# Patient Record
Sex: MALE | Race: ASIAN | NOT HISPANIC OR LATINO
[De-identification: names, ages, dates, MRNs, and addresses within clinical notes are randomized per-mention and may not be internally consistent; named-entity substitution may affect disease eponyms.]

---

## 2017-08-18 ENCOUNTER — APPOINTMENT (OUTPATIENT)
Dept: HUMAN REPRODUCTION | Facility: CLINIC | Age: 37
End: 2017-08-18

## 2018-02-16 ENCOUNTER — APPOINTMENT (OUTPATIENT)
Dept: HUMAN REPRODUCTION | Facility: CLINIC | Age: 38
End: 2018-02-16
Payer: COMMERCIAL

## 2018-02-16 PROCEDURE — 89322 SEMEN ANAL STRICT CRITERIA: CPT

## 2018-08-24 ENCOUNTER — APPOINTMENT (OUTPATIENT)
Dept: HUMAN REPRODUCTION | Facility: CLINIC | Age: 38
End: 2018-08-24
Payer: COMMERCIAL

## 2018-08-24 PROCEDURE — 36415 COLL VENOUS BLD VENIPUNCTURE: CPT

## 2018-08-24 PROCEDURE — 99211 OFF/OP EST MAY X REQ PHY/QHP: CPT | Mod: 25

## 2018-11-30 ENCOUNTER — OUTPATIENT (OUTPATIENT)
Dept: OUTPATIENT SERVICES | Facility: HOSPITAL | Age: 38
LOS: 1 days | Discharge: HOME | End: 2018-11-30

## 2018-11-30 DIAGNOSIS — D72.89 OTHER SPECIFIED DISORDERS OF WHITE BLOOD CELLS: ICD-10-CM

## 2018-12-07 ENCOUNTER — RESULT REVIEW (OUTPATIENT)
Age: 38
End: 2018-12-07

## 2018-12-07 ENCOUNTER — APPOINTMENT (OUTPATIENT)
Dept: HEMATOLOGY ONCOLOGY | Facility: CLINIC | Age: 38
End: 2018-12-07
Payer: COMMERCIAL

## 2018-12-07 ENCOUNTER — OUTPATIENT (OUTPATIENT)
Dept: OUTPATIENT SERVICES | Facility: HOSPITAL | Age: 38
LOS: 1 days | End: 2018-12-07
Payer: COMMERCIAL

## 2018-12-07 VITALS
TEMPERATURE: 98.4 F | SYSTOLIC BLOOD PRESSURE: 128 MMHG | HEIGHT: 70.98 IN | BODY MASS INDEX: 34.1 KG/M2 | OXYGEN SATURATION: 98 % | WEIGHT: 243.61 LBS | DIASTOLIC BLOOD PRESSURE: 76 MMHG | RESPIRATION RATE: 18 BRPM | HEART RATE: 76 BPM

## 2018-12-07 DIAGNOSIS — R71.8 OTHER ABNORMALITY OF RED BLOOD CELLS: ICD-10-CM

## 2018-12-07 DIAGNOSIS — G54.8 OTHER NERVE ROOT AND PLEXUS DISORDERS: ICD-10-CM

## 2018-12-07 LAB
ALBUMIN SERPL ELPH-MCNC: 4.5 G/DL
ALP BLD-CCNC: 74 U/L
ALT SERPL-CCNC: 40 U/L
AST SERPL-CCNC: 21 U/L
BASOPHILS # BLD AUTO: 0.1 K/UL — SIGNIFICANT CHANGE UP (ref 0–0.2)
BASOPHILS NFR BLD AUTO: 0.6 % — SIGNIFICANT CHANGE UP (ref 0–2)
BILIRUB DIRECT SERPL-MCNC: 0.2 MG/DL
BILIRUB INDIRECT SERPL-MCNC: 0.4 MG/DL
BILIRUB SERPL-MCNC: 0.6 MG/DL
EOSINOPHIL # BLD AUTO: 0.5 K/UL — SIGNIFICANT CHANGE UP (ref 0–0.5)
EOSINOPHIL NFR BLD AUTO: 3.8 % — SIGNIFICANT CHANGE UP (ref 0–6)
HCT VFR BLD CALC: 45.8 % — SIGNIFICANT CHANGE UP (ref 39–50)
HGB BLD-MCNC: 16.2 G/DL — SIGNIFICANT CHANGE UP (ref 13–17)
IRON SATN MFR SERPL: 24 %
IRON SERPL-MCNC: 81 UG/DL
LDH SERPL-CCNC: 144 U/L
LYMPHOCYTES # BLD AUTO: 23.2 % — SIGNIFICANT CHANGE UP (ref 13–44)
LYMPHOCYTES # BLD AUTO: 3.1 K/UL — SIGNIFICANT CHANGE UP (ref 1–3.3)
MCHC RBC-ENTMCNC: 27.3 PG — SIGNIFICANT CHANGE UP (ref 27–34)
MCHC RBC-ENTMCNC: 35.4 G/DL — SIGNIFICANT CHANGE UP (ref 32–36)
MCV RBC AUTO: 77.1 FL — LOW (ref 80–100)
MONOCYTES # BLD AUTO: 0.8 K/UL — SIGNIFICANT CHANGE UP (ref 0–0.9)
MONOCYTES NFR BLD AUTO: 6.3 % — SIGNIFICANT CHANGE UP (ref 2–14)
NEUTROPHILS # BLD AUTO: 8.8 K/UL — HIGH (ref 1.8–7.4)
NEUTROPHILS NFR BLD AUTO: 66 % — SIGNIFICANT CHANGE UP (ref 43–77)
PLATELET # BLD AUTO: 283 K/UL — SIGNIFICANT CHANGE UP (ref 150–400)
PROT SERPL-MCNC: 7 G/DL
RBC # BLD: 5.94 M/UL — HIGH (ref 4.2–5.8)
RBC # FLD: 13 % — SIGNIFICANT CHANGE UP (ref 10.3–14.5)
TIBC SERPL-MCNC: 344 UG/DL
UIBC SERPL-MCNC: 263 UG/DL
WBC # BLD: 13.3 K/UL — HIGH (ref 3.8–10.5)
WBC # FLD AUTO: 13.3 K/UL — HIGH (ref 3.8–10.5)

## 2018-12-07 PROCEDURE — G0452: CPT | Mod: 26

## 2018-12-07 PROCEDURE — 99205 OFFICE O/P NEW HI 60 MIN: CPT

## 2018-12-07 PROCEDURE — 81206 BCR/ABL1 GENE MAJOR BP: CPT

## 2018-12-07 PROCEDURE — 81207 BCR/ABL1 GENE MINOR BP: CPT

## 2018-12-08 LAB — FERRITIN SERPL-MCNC: 248 NG/ML

## 2018-12-12 LAB — BCR/ABL BY RT - PCR QUANTITATIVE: SIGNIFICANT CHANGE UP

## 2018-12-14 LAB
ALPHA - GLOBIN COMMON MUTATION RESULT: NORMAL
ALPHA - GLOBIN MUTATION VERBATIM: NORMAL

## 2018-12-15 NOTE — REASON FOR VISIT
[Initial Consultation] : an initial consultation for [Blood Count Assessment] : blood count assessment [Leukocytosis] : leukocytosis [Spouse] : spouse [FreeTextEntry2] : sickle cell trait and RBC microcytosis and prenatal counseling

## 2018-12-15 NOTE — ASSESSMENT
[FreeTextEntry1] : Mr. Corral has two unrelated problems. First, he has leukocytosis, which is likely from steroid use or inflammation from chronic back arthritis. Testing is negative for liver disorder or CML. \par \par Secondly, he has microcytosis with sickle cell trait, but testing is negative for iron deficiency or alpha globin gene mutation. Consequently, I believe it is unlikely that he has alpha thalassemia gene mutations (carrier), but it has not been completely ruled out by genetic testing.\par \par Plan:\par 1. Leukocytosis may be from chronic steroid use or inflammation. It should be monitored without specific treatment.\par 2. His risk of having child with some type of thalassemia major or intermedia appears to be extremely unlikely. However, the child has 50% risk of having sickle cell trait. [Palliative Care Plan] : not applicable at this time

## 2018-12-15 NOTE — CONSULT LETTER
[Dear  ___] : Dear  [unfilled], [Consult Letter:] : I had the pleasure of evaluating your patient, [unfilled]. [Please see my note below.] : Please see my note below. [Consult Closing:] : Thank you very much for allowing me to participate in the care of this patient.  If you have any questions, please do not hesitate to contact me. [Sincerely,] : Sincerely, [FreeTextEntry2] : Dr Chapis Aragon\par [address]\[place] [id number] [FreeTextEntry3] : Fish Mullins MD FACP

## 2018-12-15 NOTE — HISTORY OF PRESENT ILLNESS
[de-identified] : Braydon Corral is a 37 yo with sickle cell trait, H/O splenomegaly, H/O TIA, and chronic back pain, who is referred because of RBC microcytosis, prenatal counseling, and leukocytosis. In 2012, he suffered hand numbness, possible TIA, with visual disturbances after event. He was evaluated for leukocytosis at the time and had negative bone marrow biopsy. He has since had migraine headaches but no recurrence of TIA. He has had chronic back pain and steroid injections in 2014, June 2018, July 2018, and November 28, 2018. He has continued to have leukocytosis and had WBC 13.3 and ANC 8.8 on 12/7/18, when first seen by me. He also has sickle cell trait confirmed by hemoglobin electrophoresis and MCV 77.1 and RBC count 5.94 on 12/7/18. He is referred because of concern for thalassemia in future pregnancies with his partner. [Date: ____________] : Patient's last distress assessment performed on [unfilled]. [2 - Distress Level] : Distress Level: 2

## 2018-12-15 NOTE — REVIEW OF SYSTEMS
[Diarrhea] : diarrhea [Negative] : Allergic/Immunologic [FreeTextEntry2] : Weight loss from 255 to 242 lbs [FreeTextEntry7] : H/O rectal bleeding [FreeTextEntry9] : Sciatica; chronic back and knee pain [de-identified] : Chronic headaches associated with sciatica; H/O TIA

## 2018-12-15 NOTE — RESULTS/DATA
[FreeTextEntry1] : CBC (12/7/18): normal except for WBC 13.3, RBC 5.94, MCV 77.1. ANC 8.8. Differential: normal percentages. Hepatic Panel, LDH, Iron Saturation, Ferritin: normal. BCR/ABL test: negative. Alpha Globin Mutation: negative.

## 2018-12-31 ENCOUNTER — APPOINTMENT (OUTPATIENT)
Dept: NEUROSURGERY | Facility: CLINIC | Age: 38
End: 2018-12-31
Payer: COMMERCIAL

## 2018-12-31 VITALS
OXYGEN SATURATION: 97 % | BODY MASS INDEX: 34.79 KG/M2 | WEIGHT: 243 LBS | DIASTOLIC BLOOD PRESSURE: 82 MMHG | RESPIRATION RATE: 18 BRPM | HEART RATE: 98 BPM | HEIGHT: 70 IN | SYSTOLIC BLOOD PRESSURE: 121 MMHG | TEMPERATURE: 98.4 F

## 2018-12-31 DIAGNOSIS — M79.604 PAIN IN RIGHT LEG: ICD-10-CM

## 2018-12-31 DIAGNOSIS — Z82.3 FAMILY HISTORY OF STROKE: ICD-10-CM

## 2018-12-31 PROCEDURE — 99203 OFFICE O/P NEW LOW 30 MIN: CPT

## 2018-12-31 RX ORDER — CLOMIPHENE CITRATE 50 MG/1
50 TABLET ORAL
Qty: 15 | Refills: 0 | Status: DISCONTINUED | COMMUNITY
Start: 2018-03-07

## 2019-01-03 ENCOUNTER — TRANSCRIPTION ENCOUNTER (OUTPATIENT)
Age: 39
End: 2019-01-03

## 2019-01-08 ENCOUNTER — OUTPATIENT (OUTPATIENT)
Dept: OUTPATIENT SERVICES | Facility: HOSPITAL | Age: 39
LOS: 1 days | End: 2019-01-08
Payer: COMMERCIAL

## 2019-01-08 VITALS
HEIGHT: 68 IN | DIASTOLIC BLOOD PRESSURE: 87 MMHG | TEMPERATURE: 99 F | RESPIRATION RATE: 15 BRPM | OXYGEN SATURATION: 100 % | HEART RATE: 99 BPM | SYSTOLIC BLOOD PRESSURE: 131 MMHG | WEIGHT: 244.93 LBS

## 2019-01-08 DIAGNOSIS — E11.9 TYPE 2 DIABETES MELLITUS WITHOUT COMPLICATIONS: ICD-10-CM

## 2019-01-08 DIAGNOSIS — Z98.890 OTHER SPECIFIED POSTPROCEDURAL STATES: Chronic | ICD-10-CM

## 2019-01-08 DIAGNOSIS — M51.26 OTHER INTERVERTEBRAL DISC DISPLACEMENT, LUMBAR REGION: ICD-10-CM

## 2019-01-08 DIAGNOSIS — Z01.818 ENCOUNTER FOR OTHER PREPROCEDURAL EXAMINATION: ICD-10-CM

## 2019-01-08 DIAGNOSIS — L72.9 FOLLICULAR CYST OF THE SKIN AND SUBCUTANEOUS TISSUE, UNSPECIFIED: Chronic | ICD-10-CM

## 2019-01-08 DIAGNOSIS — Z29.9 ENCOUNTER FOR PROPHYLACTIC MEASURES, UNSPECIFIED: ICD-10-CM

## 2019-01-08 LAB
ANION GAP SERPL CALC-SCNC: 14 MMOL/L — SIGNIFICANT CHANGE UP (ref 5–17)
BUN SERPL-MCNC: 10 MG/DL — SIGNIFICANT CHANGE UP (ref 7–23)
CALCIUM SERPL-MCNC: 9.2 MG/DL — SIGNIFICANT CHANGE UP (ref 8.4–10.5)
CHLORIDE SERPL-SCNC: 98 MMOL/L — SIGNIFICANT CHANGE UP (ref 96–108)
CO2 SERPL-SCNC: 23 MMOL/L — SIGNIFICANT CHANGE UP (ref 22–31)
CREAT SERPL-MCNC: 0.96 MG/DL — SIGNIFICANT CHANGE UP (ref 0.5–1.3)
GLUCOSE SERPL-MCNC: 272 MG/DL — HIGH (ref 70–99)
HCT VFR BLD CALC: 43.3 % — SIGNIFICANT CHANGE UP (ref 39–50)
HGB BLD-MCNC: 15.2 G/DL — SIGNIFICANT CHANGE UP (ref 13–17)
MCHC RBC-ENTMCNC: 26.9 PG — LOW (ref 27–34)
MCHC RBC-ENTMCNC: 35.1 GM/DL — SIGNIFICANT CHANGE UP (ref 32–36)
MCV RBC AUTO: 76.6 FL — LOW (ref 80–100)
PLATELET # BLD AUTO: 274 K/UL — SIGNIFICANT CHANGE UP (ref 150–400)
POTASSIUM SERPL-MCNC: 3.6 MMOL/L — SIGNIFICANT CHANGE UP (ref 3.5–5.3)
POTASSIUM SERPL-SCNC: 3.6 MMOL/L — SIGNIFICANT CHANGE UP (ref 3.5–5.3)
RBC # BLD: 5.65 M/UL — SIGNIFICANT CHANGE UP (ref 4.2–5.8)
RBC # FLD: 13.8 % — SIGNIFICANT CHANGE UP (ref 10.3–14.5)
SODIUM SERPL-SCNC: 135 MMOL/L — SIGNIFICANT CHANGE UP (ref 135–145)
WBC # BLD: 12.52 K/UL — HIGH (ref 3.8–10.5)
WBC # FLD AUTO: 12.52 K/UL — HIGH (ref 3.8–10.5)

## 2019-01-08 PROCEDURE — 85027 COMPLETE CBC AUTOMATED: CPT

## 2019-01-08 PROCEDURE — 87640 STAPH A DNA AMP PROBE: CPT

## 2019-01-08 PROCEDURE — 80048 BASIC METABOLIC PNL TOTAL CA: CPT

## 2019-01-08 PROCEDURE — G0463: CPT

## 2019-01-08 PROCEDURE — 87641 MR-STAPH DNA AMP PROBE: CPT

## 2019-01-08 PROCEDURE — 83036 HEMOGLOBIN GLYCOSYLATED A1C: CPT

## 2019-01-08 RX ORDER — SODIUM CHLORIDE 9 MG/ML
3 INJECTION INTRAMUSCULAR; INTRAVENOUS; SUBCUTANEOUS EVERY 8 HOURS
Qty: 0 | Refills: 0 | Status: DISCONTINUED | OUTPATIENT
Start: 2019-01-22 | End: 2019-01-22

## 2019-01-08 RX ORDER — LIDOCAINE HCL 20 MG/ML
0.2 VIAL (ML) INJECTION ONCE
Qty: 0 | Refills: 0 | Status: DISCONTINUED | OUTPATIENT
Start: 2019-01-22 | End: 2019-01-22

## 2019-01-08 RX ORDER — CEFAZOLIN SODIUM 1 G
2000 VIAL (EA) INJECTION ONCE
Qty: 0 | Refills: 0 | Status: DISCONTINUED | OUTPATIENT
Start: 2019-01-22 | End: 2019-01-25

## 2019-01-08 NOTE — H&P PST ADULT - HISTORY OF PRESENT ILLNESS
37 y/o male with PMHx of sickle cell trait, possible TIA (2012), RBC microcytosis, and 39 y/o male with PMHx of sickle cell trait, possible TIA (2012), RBC microcytosis, chronic lower back pain c/o intermittent worsening lower back pain radiating down both legs with associated tingling in both feet. Patient reports receiving cortisone injections, most recent in November with little relief. Diagnostic imaging revealed L5-S1 disc extrusion. Evaluated by Dr. Arce. Presents to PST for a scheduled L5-S1 bilateral laminotomies, possible laminectomy and excision of herniated disc on 1/15/2019. 37 y/o male with PMHx of sickle cell trait, possible TIA (2012), RBC microcytosis, chronic lower back pain c/o intermittent worsening lower back pain radiating down both legs with associated tingling in both feet. Patient reports receiving steroid injections, most recent in November with little relief. Diagnostic imaging revealed L5-S1 disc extrusion. Evaluated by Dr. Arce. Presents to PST for a scheduled L5-S1 bilateral laminotomies, possible laminectomy and excision of herniated disc on 1/15/2019.

## 2019-01-08 NOTE — H&P PST ADULT - ASSESSMENT
CAPRINI SCORE [CLOT]    AGE RELATED RISK FACTORS                                                       MOBILITY RELATED FACTORS  [ ] Age 41-60 years                                            (1 Point)                  [ ] Bed rest                                                        (1 Point)  [ ] Age: 61-74 years                                           (2 Points)                 [ ] Plaster cast                                                   (2 Points)  [ ] Age= 75 years                                              (3 Points)                 [ ] Bed bound for more than 72 hours                 (2 Points)    DISEASE RELATED RISK FACTORS                                               GENDER SPECIFIC FACTORS  [ ] Edema in the lower extremities                       (1 Point)                  [ ] Pregnancy                                                     (1 Point)  [ ] Varicose veins                                               (1 Point)                  [ ] Post-partum < 6 weeks                                   (1 Point)             [x] BMI > 25 Kg/m2                                            (1 Point)                  [ ] Hormonal therapy  or oral contraception          (1 Point)                 [ ] Sepsis (in the previous month)                        (1 Point)                  [ ] History of pregnancy complications                 (1 point)  [ ] Pneumonia or serious lung disease                                               [ ] Unexplained or recurrent                     (1 Point)           (in the previous month)                               (1 Point)  [ ] Abnormal pulmonary function test                     (1 Point)                 SURGERY RELATED RISK FACTORS  [ ] Acute myocardial infarction                              (1 Point)                 [ ]  Section                                             (1 Point)  [ ] Congestive heart failure (in the previous month)  (1 Point)               [ ] Minor surgery                                                  (1 Point)   [ ] Inflammatory bowel disease                             (1 Point)                 [ ] Arthroscopic surgery                                        (2 Points)  [ ] Central venous access                                      (2 Points)                [x] General surgery lasting more than 45 minutes   (2 Points)       [ ] Stroke (in the previous month)                          (5 Points)               [ ] Elective arthroplasty                                         (5 Points)                                                                                                                                               HEMATOLOGY RELATED FACTORS                                                 TRAUMA RELATED RISK FACTORS  [ ] Prior episodes of VTE                                     (3 Points)                 [ ] Fracture of the hip, pelvis, or leg                       (5 Points)  [ ] Positive family history for VTE                         (3 Points)                 [ ] Acute spinal cord injury (in the previous month)  (5 Points)  [ ] Prothrombin 02751 A                                     (3 Points)                 [ ] Paralysis  (less than 1 month)                             (5 Points)  [ ] Factor V Leiden                                             (3 Points)                  [ ] Multiple Trauma within 1 month                        (5 Points)  [ ] Lupus anticoagulants                                     (3 Points)                                                           [ ] Anticardiolipin antibodies                               (3 Points)                                                       [ ] High homocysteine in the blood                      (3 Points)                                             [ ] Other congenital or acquired thrombophilia      (3 Points)                                                [ ] Heparin induced thrombocytopenia                  (3 Points)                                          Total Score [   3    ]

## 2019-01-08 NOTE — H&P PST ADULT - NSANTHOSAYNRD_GEN_A_CORE
No. TIANNA screening performed.  STOP BANG Legend: 0-2 = LOW Risk; 3-4 = INTERMEDIATE Risk; 5-8 = HIGH Risk

## 2019-01-08 NOTE — H&P PST ADULT - PROBLEM SELECTOR PLAN 1
Scheduled L5-S1 bilateral laminotomies, possible laminectomy and excision of herniated disc on 1/15/2019  Pre-op instructions given, lab work sent

## 2019-01-08 NOTE — H&P PST ADULT - PMH
Disc displacement, lumbar    H/O pilonidal cyst    T2DM (type 2 diabetes mellitus) Disc displacement, lumbar    H/O pilonidal cyst    RBC microcytosis    Sickle cell trait    T2DM (type 2 diabetes mellitus)    TIA (transient ischemic attack)  possible, ~2012

## 2019-01-08 NOTE — H&P PST ADULT - ATTENDING COMMENTS
Pt as per above note. Symptoms are unchanged. He underwent CT and new MRI, the latter showing evidence of arachnoiditis and former showing large calcified disc with spurs at L5S1.  L45 with loss of dixon signal and mild bulging without stenosis.  Plan was changed to perform L5S1 discectomy, interbody fusion, posterolateral instrumentation  and fusion.  R/B/A discussed with pt in clinic and over phone including potential of adjacent segment breakdown and benefit of single level fusion.  Pt ok to proceed.

## 2019-01-09 PROBLEM — G45.9 TRANSIENT CEREBRAL ISCHEMIC ATTACK, UNSPECIFIED: Chronic | Status: ACTIVE | Noted: 2019-01-08

## 2019-01-09 LAB
HBA1C BLD-MCNC: 8.4 % — HIGH (ref 4–5.6)
MRSA PCR RESULT.: SIGNIFICANT CHANGE UP
S AUREUS DNA NOSE QL NAA+PROBE: SIGNIFICANT CHANGE UP

## 2019-01-11 ENCOUNTER — APPOINTMENT (OUTPATIENT)
Dept: MRI IMAGING | Facility: CLINIC | Age: 39
End: 2019-01-11
Payer: COMMERCIAL

## 2019-01-11 ENCOUNTER — OUTPATIENT (OUTPATIENT)
Dept: OUTPATIENT SERVICES | Facility: HOSPITAL | Age: 39
LOS: 1 days | End: 2019-01-11
Payer: COMMERCIAL

## 2019-01-11 DIAGNOSIS — Z00.8 ENCOUNTER FOR OTHER GENERAL EXAMINATION: ICD-10-CM

## 2019-01-11 DIAGNOSIS — Z98.890 OTHER SPECIFIED POSTPROCEDURAL STATES: Chronic | ICD-10-CM

## 2019-01-11 DIAGNOSIS — L72.9 FOLLICULAR CYST OF THE SKIN AND SUBCUTANEOUS TISSUE, UNSPECIFIED: Chronic | ICD-10-CM

## 2019-01-11 PROBLEM — D57.3 SICKLE-CELL TRAIT: Chronic | Status: ACTIVE | Noted: 2019-01-08

## 2019-01-11 PROBLEM — E11.9 TYPE 2 DIABETES MELLITUS WITHOUT COMPLICATIONS: Chronic | Status: ACTIVE | Noted: 2019-01-08

## 2019-01-11 PROBLEM — R71.8 OTHER ABNORMALITY OF RED BLOOD CELLS: Chronic | Status: ACTIVE | Noted: 2019-01-08

## 2019-01-11 PROBLEM — M51.26 OTHER INTERVERTEBRAL DISC DISPLACEMENT, LUMBAR REGION: Chronic | Status: ACTIVE | Noted: 2019-01-08

## 2019-01-11 PROCEDURE — 72148 MRI LUMBAR SPINE W/O DYE: CPT | Mod: 26

## 2019-01-11 PROCEDURE — 72148 MRI LUMBAR SPINE W/O DYE: CPT

## 2019-01-12 ENCOUNTER — APPOINTMENT (OUTPATIENT)
Dept: CT IMAGING | Facility: CLINIC | Age: 39
End: 2019-01-12

## 2019-01-14 ENCOUNTER — OUTPATIENT (OUTPATIENT)
Dept: OUTPATIENT SERVICES | Facility: HOSPITAL | Age: 39
LOS: 1 days | End: 2019-01-14
Payer: COMMERCIAL

## 2019-01-14 DIAGNOSIS — M51.26 OTHER INTERVERTEBRAL DISC DISPLACEMENT, LUMBAR REGION: ICD-10-CM

## 2019-01-14 DIAGNOSIS — Z98.890 OTHER SPECIFIED POSTPROCEDURAL STATES: Chronic | ICD-10-CM

## 2019-01-14 DIAGNOSIS — L72.9 FOLLICULAR CYST OF THE SKIN AND SUBCUTANEOUS TISSUE, UNSPECIFIED: Chronic | ICD-10-CM

## 2019-01-14 PROCEDURE — 72131 CT LUMBAR SPINE W/O DYE: CPT

## 2019-01-14 PROCEDURE — 72131 CT LUMBAR SPINE W/O DYE: CPT | Mod: 26

## 2019-01-15 ENCOUNTER — APPOINTMENT (OUTPATIENT)
Dept: RADIOLOGY | Facility: CLINIC | Age: 39
End: 2019-01-15
Payer: COMMERCIAL

## 2019-01-15 ENCOUNTER — OUTPATIENT (OUTPATIENT)
Dept: OUTPATIENT SERVICES | Facility: HOSPITAL | Age: 39
LOS: 1 days | End: 2019-01-15
Payer: COMMERCIAL

## 2019-01-15 DIAGNOSIS — L72.9 FOLLICULAR CYST OF THE SKIN AND SUBCUTANEOUS TISSUE, UNSPECIFIED: Chronic | ICD-10-CM

## 2019-01-15 DIAGNOSIS — M51.26 OTHER INTERVERTEBRAL DISC DISPLACEMENT, LUMBAR REGION: ICD-10-CM

## 2019-01-15 DIAGNOSIS — Z98.890 OTHER SPECIFIED POSTPROCEDURAL STATES: Chronic | ICD-10-CM

## 2019-01-15 PROCEDURE — 72082 X-RAY EXAM ENTIRE SPI 2/3 VW: CPT

## 2019-01-15 PROCEDURE — 72082 X-RAY EXAM ENTIRE SPI 2/3 VW: CPT | Mod: 26

## 2019-01-18 ENCOUNTER — APPOINTMENT (OUTPATIENT)
Dept: ORTHOPEDIC SURGERY | Facility: CLINIC | Age: 39
End: 2019-01-18
Payer: COMMERCIAL

## 2019-01-18 VITALS
HEART RATE: 111 BPM | DIASTOLIC BLOOD PRESSURE: 85 MMHG | BODY MASS INDEX: 34.79 KG/M2 | HEIGHT: 70 IN | SYSTOLIC BLOOD PRESSURE: 119 MMHG | WEIGHT: 243 LBS

## 2019-01-18 PROCEDURE — 99215 OFFICE O/P EST HI 40 MIN: CPT

## 2019-01-21 ENCOUNTER — TRANSCRIPTION ENCOUNTER (OUTPATIENT)
Age: 39
End: 2019-01-21

## 2019-01-22 ENCOUNTER — INPATIENT (INPATIENT)
Facility: HOSPITAL | Age: 39
LOS: 6 days | Discharge: HOME | DRG: 459 | End: 2019-01-29
Attending: NEUROLOGICAL SURGERY | Admitting: NEUROLOGICAL SURGERY
Payer: COMMERCIAL

## 2019-01-22 ENCOUNTER — RESULT REVIEW (OUTPATIENT)
Age: 39
End: 2019-01-22

## 2019-01-22 ENCOUNTER — APPOINTMENT (OUTPATIENT)
Dept: NEUROSURGERY | Facility: CLINIC | Age: 39
End: 2019-01-22
Payer: COMMERCIAL

## 2019-01-22 VITALS
RESPIRATION RATE: 17 BRPM | HEART RATE: 83 BPM | OXYGEN SATURATION: 98 % | TEMPERATURE: 99 F | DIASTOLIC BLOOD PRESSURE: 87 MMHG | WEIGHT: 244.93 LBS | SYSTOLIC BLOOD PRESSURE: 119 MMHG | HEIGHT: 68 IN

## 2019-01-22 DIAGNOSIS — L72.9 FOLLICULAR CYST OF THE SKIN AND SUBCUTANEOUS TISSUE, UNSPECIFIED: Chronic | ICD-10-CM

## 2019-01-22 DIAGNOSIS — M51.26 OTHER INTERVERTEBRAL DISC DISPLACEMENT, LUMBAR REGION: ICD-10-CM

## 2019-01-22 DIAGNOSIS — Z98.890 OTHER SPECIFIED POSTPROCEDURAL STATES: Chronic | ICD-10-CM

## 2019-01-22 LAB
ANION GAP SERPL CALC-SCNC: 15 MMOL/L — SIGNIFICANT CHANGE UP (ref 5–17)
BASOPHILS # BLD AUTO: 0 K/UL — SIGNIFICANT CHANGE UP (ref 0–0.2)
BASOPHILS NFR BLD AUTO: 0 % — SIGNIFICANT CHANGE UP (ref 0–2)
BLD GP AB SCN SERPL QL: NEGATIVE — SIGNIFICANT CHANGE UP
BUN SERPL-MCNC: 8 MG/DL — SIGNIFICANT CHANGE UP (ref 7–23)
CALCIUM SERPL-MCNC: 8.9 MG/DL — SIGNIFICANT CHANGE UP (ref 8.4–10.5)
CHLORIDE SERPL-SCNC: 103 MMOL/L — SIGNIFICANT CHANGE UP (ref 96–108)
CO2 SERPL-SCNC: 23 MMOL/L — SIGNIFICANT CHANGE UP (ref 22–31)
CREAT SERPL-MCNC: 1.14 MG/DL — SIGNIFICANT CHANGE UP (ref 0.5–1.3)
EOSINOPHIL # BLD AUTO: 0 K/UL — SIGNIFICANT CHANGE UP (ref 0–0.5)
EOSINOPHIL NFR BLD AUTO: 0.2 % — SIGNIFICANT CHANGE UP (ref 0–6)
GLUCOSE BLDC GLUCOMTR-MCNC: 200 MG/DL — HIGH (ref 70–99)
GLUCOSE BLDC GLUCOMTR-MCNC: 204 MG/DL — HIGH (ref 70–99)
GLUCOSE BLDC GLUCOMTR-MCNC: 223 MG/DL — HIGH (ref 70–99)
GLUCOSE BLDC GLUCOMTR-MCNC: 228 MG/DL — HIGH (ref 70–99)
GLUCOSE SERPL-MCNC: 257 MG/DL — HIGH (ref 70–99)
HCT VFR BLD CALC: 44.1 % — SIGNIFICANT CHANGE UP (ref 39–50)
HGB BLD-MCNC: 15.6 G/DL — SIGNIFICANT CHANGE UP (ref 13–17)
LYMPHOCYTES # BLD AUTO: 1.5 K/UL — SIGNIFICANT CHANGE UP (ref 1–3.3)
LYMPHOCYTES # BLD AUTO: 7.9 % — LOW (ref 13–44)
MCHC RBC-ENTMCNC: 27.5 PG — SIGNIFICANT CHANGE UP (ref 27–34)
MCHC RBC-ENTMCNC: 35.4 GM/DL — SIGNIFICANT CHANGE UP (ref 32–36)
MCV RBC AUTO: 77.6 FL — LOW (ref 80–100)
MONOCYTES # BLD AUTO: 0.5 K/UL — SIGNIFICANT CHANGE UP (ref 0–0.9)
MONOCYTES NFR BLD AUTO: 2.5 % — SIGNIFICANT CHANGE UP (ref 2–14)
NEUTROPHILS # BLD AUTO: 16.8 K/UL — HIGH (ref 1.8–7.4)
NEUTROPHILS NFR BLD AUTO: 89.4 % — HIGH (ref 43–77)
PLATELET # BLD AUTO: 322 K/UL — SIGNIFICANT CHANGE UP (ref 150–400)
POTASSIUM SERPL-MCNC: 4.9 MMOL/L — SIGNIFICANT CHANGE UP (ref 3.5–5.3)
POTASSIUM SERPL-SCNC: 4.9 MMOL/L — SIGNIFICANT CHANGE UP (ref 3.5–5.3)
RBC # BLD: 5.67 M/UL — SIGNIFICANT CHANGE UP (ref 4.2–5.8)
RBC # FLD: 13 % — SIGNIFICANT CHANGE UP (ref 10.3–14.5)
RH IG SCN BLD-IMP: POSITIVE — SIGNIFICANT CHANGE UP
RH IG SCN BLD-IMP: POSITIVE — SIGNIFICANT CHANGE UP
SODIUM SERPL-SCNC: 141 MMOL/L — SIGNIFICANT CHANGE UP (ref 135–145)
WBC # BLD: 18.7 K/UL — HIGH (ref 3.8–10.5)
WBC # FLD AUTO: 18.7 K/UL — HIGH (ref 3.8–10.5)

## 2019-01-22 PROCEDURE — 63047 LAM FACETEC & FORAMOT LUMBAR: CPT | Mod: 59

## 2019-01-22 PROCEDURE — 22853 INSJ BIOMECHANICAL DEVICE: CPT

## 2019-01-22 PROCEDURE — 22633 ARTHRD CMBN 1NTRSPC LUMBAR: CPT

## 2019-01-22 PROCEDURE — 88311 DECALCIFY TISSUE: CPT | Mod: 26

## 2019-01-22 PROCEDURE — 88304 TISSUE EXAM BY PATHOLOGIST: CPT | Mod: 26

## 2019-01-22 PROCEDURE — 22840 INSERT SPINE FIXATION DEVICE: CPT

## 2019-01-22 RX ORDER — DEXTROSE 50 % IN WATER 50 %
15 SYRINGE (ML) INTRAVENOUS ONCE
Qty: 0 | Refills: 0 | Status: DISCONTINUED | OUTPATIENT
Start: 2019-01-22 | End: 2019-01-29

## 2019-01-22 RX ORDER — DEXAMETHASONE 0.5 MG/5ML
4 ELIXIR ORAL EVERY 6 HOURS
Qty: 0 | Refills: 0 | Status: DISCONTINUED | OUTPATIENT
Start: 2019-01-22 | End: 2019-01-25

## 2019-01-22 RX ORDER — DOCUSATE SODIUM 100 MG
100 CAPSULE ORAL THREE TIMES A DAY
Qty: 0 | Refills: 0 | Status: DISCONTINUED | OUTPATIENT
Start: 2019-01-22 | End: 2019-01-29

## 2019-01-22 RX ORDER — ONDANSETRON 8 MG/1
4 TABLET, FILM COATED ORAL EVERY 6 HOURS
Qty: 0 | Refills: 0 | Status: DISCONTINUED | OUTPATIENT
Start: 2019-01-22 | End: 2019-01-25

## 2019-01-22 RX ORDER — DEXTROSE 50 % IN WATER 50 %
25 SYRINGE (ML) INTRAVENOUS ONCE
Qty: 0 | Refills: 0 | Status: DISCONTINUED | OUTPATIENT
Start: 2019-01-22 | End: 2019-01-29

## 2019-01-22 RX ORDER — ACETAMINOPHEN 500 MG
1000 TABLET ORAL ONCE
Qty: 0 | Refills: 0 | Status: COMPLETED | OUTPATIENT
Start: 2019-01-22 | End: 2019-01-22

## 2019-01-22 RX ORDER — ASCORBIC ACID 60 MG
500 TABLET,CHEWABLE ORAL
Qty: 0 | Refills: 0 | Status: DISCONTINUED | OUTPATIENT
Start: 2019-01-22 | End: 2019-01-29

## 2019-01-22 RX ORDER — HYDROMORPHONE HYDROCHLORIDE 2 MG/ML
30 INJECTION INTRAMUSCULAR; INTRAVENOUS; SUBCUTANEOUS
Qty: 0 | Refills: 0 | Status: DISCONTINUED | OUTPATIENT
Start: 2019-01-22 | End: 2019-01-25

## 2019-01-22 RX ORDER — HYDROMORPHONE HYDROCHLORIDE 2 MG/ML
0.5 INJECTION INTRAMUSCULAR; INTRAVENOUS; SUBCUTANEOUS
Qty: 0 | Refills: 0 | Status: DISCONTINUED | OUTPATIENT
Start: 2019-01-22 | End: 2019-01-22

## 2019-01-22 RX ORDER — SODIUM CHLORIDE 9 MG/ML
1000 INJECTION, SOLUTION INTRAVENOUS
Qty: 0 | Refills: 0 | Status: DISCONTINUED | OUTPATIENT
Start: 2019-01-22 | End: 2019-01-25

## 2019-01-22 RX ORDER — CEFAZOLIN SODIUM 1 G
2000 VIAL (EA) INJECTION EVERY 8 HOURS
Qty: 0 | Refills: 0 | Status: COMPLETED | OUTPATIENT
Start: 2019-01-22 | End: 2019-01-23

## 2019-01-22 RX ORDER — GLUCAGON INJECTION, SOLUTION 0.5 MG/.1ML
1 INJECTION, SOLUTION SUBCUTANEOUS ONCE
Qty: 0 | Refills: 0 | Status: DISCONTINUED | OUTPATIENT
Start: 2019-01-22 | End: 2019-01-29

## 2019-01-22 RX ORDER — MAGNESIUM HYDROXIDE 400 MG/1
30 TABLET, CHEWABLE ORAL EVERY 12 HOURS
Qty: 0 | Refills: 0 | Status: DISCONTINUED | OUTPATIENT
Start: 2019-01-22 | End: 2019-01-29

## 2019-01-22 RX ORDER — METHOCARBAMOL 500 MG/1
750 TABLET, FILM COATED ORAL EVERY 6 HOURS
Qty: 0 | Refills: 0 | Status: DISCONTINUED | OUTPATIENT
Start: 2019-01-22 | End: 2019-01-29

## 2019-01-22 RX ORDER — SENNA PLUS 8.6 MG/1
2 TABLET ORAL AT BEDTIME
Qty: 0 | Refills: 0 | Status: DISCONTINUED | OUTPATIENT
Start: 2019-01-22 | End: 2019-01-29

## 2019-01-22 RX ORDER — NALOXONE HYDROCHLORIDE 4 MG/.1ML
0.1 SPRAY NASAL
Qty: 0 | Refills: 0 | Status: DISCONTINUED | OUTPATIENT
Start: 2019-01-22 | End: 2019-01-25

## 2019-01-22 RX ORDER — SODIUM CHLORIDE 9 MG/ML
1000 INJECTION, SOLUTION INTRAVENOUS
Qty: 0 | Refills: 0 | Status: DISCONTINUED | OUTPATIENT
Start: 2019-01-22 | End: 2019-01-29

## 2019-01-22 RX ORDER — DEXTROSE 50 % IN WATER 50 %
12.5 SYRINGE (ML) INTRAVENOUS ONCE
Qty: 0 | Refills: 0 | Status: DISCONTINUED | OUTPATIENT
Start: 2019-01-22 | End: 2019-01-29

## 2019-01-22 RX ORDER — ONDANSETRON 8 MG/1
4 TABLET, FILM COATED ORAL ONCE
Qty: 0 | Refills: 0 | Status: DISCONTINUED | OUTPATIENT
Start: 2019-01-22 | End: 2019-01-22

## 2019-01-22 RX ORDER — GABAPENTIN 400 MG/1
300 CAPSULE ORAL AT BEDTIME
Qty: 0 | Refills: 0 | Status: DISCONTINUED | OUTPATIENT
Start: 2019-01-22 | End: 2019-01-25

## 2019-01-22 RX ORDER — HYDROMORPHONE HYDROCHLORIDE 2 MG/ML
0.5 INJECTION INTRAMUSCULAR; INTRAVENOUS; SUBCUTANEOUS
Qty: 0 | Refills: 0 | Status: DISCONTINUED | OUTPATIENT
Start: 2019-01-22 | End: 2019-01-25

## 2019-01-22 RX ORDER — INSULIN LISPRO 100/ML
VIAL (ML) SUBCUTANEOUS AT BEDTIME
Qty: 0 | Refills: 0 | Status: DISCONTINUED | OUTPATIENT
Start: 2019-01-22 | End: 2019-01-29

## 2019-01-22 RX ORDER — INSULIN LISPRO 100/ML
VIAL (ML) SUBCUTANEOUS
Qty: 0 | Refills: 0 | Status: DISCONTINUED | OUTPATIENT
Start: 2019-01-22 | End: 2019-01-25

## 2019-01-22 RX ADMIN — Medication 0: at 21:38

## 2019-01-22 RX ADMIN — Medication 100 MILLIGRAM(S): at 21:37

## 2019-01-22 RX ADMIN — HYDROMORPHONE HYDROCHLORIDE 30 MILLILITER(S): 2 INJECTION INTRAMUSCULAR; INTRAVENOUS; SUBCUTANEOUS at 19:51

## 2019-01-22 RX ADMIN — HYDROMORPHONE HYDROCHLORIDE 30 MILLILITER(S): 2 INJECTION INTRAMUSCULAR; INTRAVENOUS; SUBCUTANEOUS at 18:21

## 2019-01-22 RX ADMIN — Medication 400 MILLIGRAM(S): at 22:25

## 2019-01-22 RX ADMIN — HYDROMORPHONE HYDROCHLORIDE 30 MILLILITER(S): 2 INJECTION INTRAMUSCULAR; INTRAVENOUS; SUBCUTANEOUS at 22:28

## 2019-01-22 RX ADMIN — HYDROMORPHONE HYDROCHLORIDE 30 MILLILITER(S): 2 INJECTION INTRAMUSCULAR; INTRAVENOUS; SUBCUTANEOUS at 23:36

## 2019-01-22 RX ADMIN — GABAPENTIN 300 MILLIGRAM(S): 400 CAPSULE ORAL at 21:40

## 2019-01-22 RX ADMIN — SODIUM CHLORIDE 3 MILLILITER(S): 9 INJECTION INTRAMUSCULAR; INTRAVENOUS; SUBCUTANEOUS at 07:07

## 2019-01-22 RX ADMIN — SODIUM CHLORIDE 100 MILLILITER(S): 9 INJECTION, SOLUTION INTRAVENOUS at 22:26

## 2019-01-22 RX ADMIN — Medication 4 MILLIGRAM(S): at 22:41

## 2019-01-22 RX ADMIN — Medication 1000 MILLIGRAM(S): at 22:26

## 2019-01-22 NOTE — PROGRESS NOTE ADULT - SUBJECTIVE AND OBJECTIVE BOX
Patient seen and examined at bedside.    T(C): 36 (01-22-19 @ 17:55), Max: 37 (01-22-19 @ 06:44)  HR: 99 (01-22-19 @ 18:45) (83 - 101)  BP: 135/77 (01-22-19 @ 18:45) (119/87 - 150/-)  RR: 17 (01-22-19 @ 10:18) (17 - 17)  SpO2: 99% (01-22-19 @ 18:45) (98% - 100%)  Wt(kg): --    Exam:    PHYSICAL EXAM:    Constitutional: No Acute Distress     Neurological: AOx3, Following Commands    Motor exam:          Upper extremity                         Delt     Bicep     Tricep    HG                                                 R         5/5        5/5        5/5       5/5                                               L          5/5        5/5        5/5       5/5          Lower extremity                        HF         KF        KE       DF         PF                            R (pain limited)          4/5        5/5        5/5       5/5         5/5                                               L         5/5        5/5       5/5       5/5          5/5                                                 Sensation: [X] intact to light touch  [] decreased:     No clonus    Incision: c/d/i

## 2019-01-22 NOTE — PROGRESS NOTE ADULT - ASSESSMENT
38 yr old s/p Bilateral L5-S1 TLIFs w/ pedicle screw fixation Intraoperative CSF leak w/ primary repair    Plan:  - Floor  - PT  - Pain control  - HMV

## 2019-01-22 NOTE — BRIEF OPERATIVE NOTE - PROCEDURE
<<-----Click on this checkbox to enter Procedure Posterior lumbar interbody fusion (PLIF) or posterior transforaminal lumbar interbody fusion (TLIF) at single level  01/22/2019  Bilateral L5-S1 TLIF and pedicle screw fixation  Active  [id number]

## 2019-01-23 DIAGNOSIS — D72.829 ELEVATED WHITE BLOOD CELL COUNT, UNSPECIFIED: ICD-10-CM

## 2019-01-23 DIAGNOSIS — E11.9 TYPE 2 DIABETES MELLITUS WITHOUT COMPLICATIONS: ICD-10-CM

## 2019-01-23 DIAGNOSIS — M54.9 DORSALGIA, UNSPECIFIED: ICD-10-CM

## 2019-01-23 LAB
ANION GAP SERPL CALC-SCNC: 11 MMOL/L — SIGNIFICANT CHANGE UP (ref 5–17)
BASOPHILS # BLD AUTO: 0.01 K/UL — SIGNIFICANT CHANGE UP (ref 0–0.2)
BASOPHILS NFR BLD AUTO: 0.1 % — SIGNIFICANT CHANGE UP (ref 0–2)
BUN SERPL-MCNC: 7 MG/DL — SIGNIFICANT CHANGE UP (ref 7–23)
CALCIUM SERPL-MCNC: 8.5 MG/DL — SIGNIFICANT CHANGE UP (ref 8.4–10.5)
CHLORIDE SERPL-SCNC: 104 MMOL/L — SIGNIFICANT CHANGE UP (ref 96–108)
CO2 SERPL-SCNC: 27 MMOL/L — SIGNIFICANT CHANGE UP (ref 22–31)
CREAT SERPL-MCNC: 0.88 MG/DL — SIGNIFICANT CHANGE UP (ref 0.5–1.3)
EOSINOPHIL # BLD AUTO: 0 K/UL — SIGNIFICANT CHANGE UP (ref 0–0.5)
EOSINOPHIL NFR BLD AUTO: 0 % — SIGNIFICANT CHANGE UP (ref 0–6)
GLUCOSE BLDC GLUCOMTR-MCNC: 212 MG/DL — HIGH (ref 70–99)
GLUCOSE BLDC GLUCOMTR-MCNC: 215 MG/DL — HIGH (ref 70–99)
GLUCOSE BLDC GLUCOMTR-MCNC: 229 MG/DL — HIGH (ref 70–99)
GLUCOSE BLDC GLUCOMTR-MCNC: 232 MG/DL — HIGH (ref 70–99)
GLUCOSE BLDC GLUCOMTR-MCNC: 233 MG/DL — HIGH (ref 70–99)
GLUCOSE SERPL-MCNC: 237 MG/DL — HIGH (ref 70–99)
HCT VFR BLD CALC: 39.4 % — SIGNIFICANT CHANGE UP (ref 39–50)
HGB BLD-MCNC: 13.4 G/DL — SIGNIFICANT CHANGE UP (ref 13–17)
IMM GRANULOCYTES NFR BLD AUTO: 0.3 % — SIGNIFICANT CHANGE UP (ref 0–1.5)
LYMPHOCYTES # BLD AUTO: 1.39 K/UL — SIGNIFICANT CHANGE UP (ref 1–3.3)
LYMPHOCYTES # BLD AUTO: 8 % — LOW (ref 13–44)
MCHC RBC-ENTMCNC: 27 PG — SIGNIFICANT CHANGE UP (ref 27–34)
MCHC RBC-ENTMCNC: 34 GM/DL — SIGNIFICANT CHANGE UP (ref 32–36)
MCV RBC AUTO: 79.3 FL — LOW (ref 80–100)
MONOCYTES # BLD AUTO: 0.86 K/UL — SIGNIFICANT CHANGE UP (ref 0–0.9)
MONOCYTES NFR BLD AUTO: 5 % — SIGNIFICANT CHANGE UP (ref 2–14)
NEUTROPHILS # BLD AUTO: 15.03 K/UL — HIGH (ref 1.8–7.4)
NEUTROPHILS NFR BLD AUTO: 86.6 % — HIGH (ref 43–77)
PLATELET # BLD AUTO: 287 K/UL — SIGNIFICANT CHANGE UP (ref 150–400)
POTASSIUM SERPL-MCNC: 4.2 MMOL/L — SIGNIFICANT CHANGE UP (ref 3.5–5.3)
POTASSIUM SERPL-SCNC: 4.2 MMOL/L — SIGNIFICANT CHANGE UP (ref 3.5–5.3)
RBC # BLD: 4.97 M/UL — SIGNIFICANT CHANGE UP (ref 4.2–5.8)
RBC # FLD: 14.1 % — SIGNIFICANT CHANGE UP (ref 10.3–14.5)
SODIUM SERPL-SCNC: 142 MMOL/L — SIGNIFICANT CHANGE UP (ref 135–145)
WBC # BLD: 17.35 K/UL — HIGH (ref 3.8–10.5)
WBC # FLD AUTO: 17.35 K/UL — HIGH (ref 3.8–10.5)

## 2019-01-23 PROCEDURE — 99222 1ST HOSP IP/OBS MODERATE 55: CPT

## 2019-01-23 RX ORDER — ACETAMINOPHEN 500 MG
1000 TABLET ORAL ONCE
Qty: 0 | Refills: 0 | Status: COMPLETED | OUTPATIENT
Start: 2019-01-23 | End: 2019-01-23

## 2019-01-23 RX ORDER — ENOXAPARIN SODIUM 100 MG/ML
40 INJECTION SUBCUTANEOUS AT BEDTIME
Qty: 0 | Refills: 0 | Status: DISCONTINUED | OUTPATIENT
Start: 2019-01-23 | End: 2019-01-29

## 2019-01-23 RX ORDER — INSULIN GLARGINE 100 [IU]/ML
5 INJECTION, SOLUTION SUBCUTANEOUS AT BEDTIME
Qty: 0 | Refills: 0 | Status: DISCONTINUED | OUTPATIENT
Start: 2019-01-23 | End: 2019-01-24

## 2019-01-23 RX ORDER — INSULIN LISPRO 100/ML
4 VIAL (ML) SUBCUTANEOUS
Qty: 0 | Refills: 0 | Status: DISCONTINUED | OUTPATIENT
Start: 2019-01-23 | End: 2019-01-29

## 2019-01-23 RX ADMIN — HYDROMORPHONE HYDROCHLORIDE 30 MILLILITER(S): 2 INJECTION INTRAMUSCULAR; INTRAVENOUS; SUBCUTANEOUS at 21:51

## 2019-01-23 RX ADMIN — Medication 100 MILLIGRAM(S): at 14:43

## 2019-01-23 RX ADMIN — Medication 100 MILLIGRAM(S): at 05:37

## 2019-01-23 RX ADMIN — GABAPENTIN 300 MILLIGRAM(S): 400 CAPSULE ORAL at 21:55

## 2019-01-23 RX ADMIN — Medication 4 UNIT(S): at 14:45

## 2019-01-23 RX ADMIN — Medication 4 UNIT(S): at 18:41

## 2019-01-23 RX ADMIN — INSULIN GLARGINE 5 UNIT(S): 100 INJECTION, SOLUTION SUBCUTANEOUS at 21:56

## 2019-01-23 RX ADMIN — Medication 100 MILLIGRAM(S): at 21:54

## 2019-01-23 RX ADMIN — Medication 500 MILLIGRAM(S): at 21:55

## 2019-01-23 RX ADMIN — Medication 400 MILLIGRAM(S): at 23:57

## 2019-01-23 RX ADMIN — Medication 400 MILLIGRAM(S): at 16:18

## 2019-01-23 RX ADMIN — Medication 4 MILLIGRAM(S): at 11:32

## 2019-01-23 RX ADMIN — Medication 4: at 08:10

## 2019-01-23 RX ADMIN — Medication 4: at 14:43

## 2019-01-23 RX ADMIN — HYDROMORPHONE HYDROCHLORIDE 30 MILLILITER(S): 2 INJECTION INTRAMUSCULAR; INTRAVENOUS; SUBCUTANEOUS at 14:49

## 2019-01-23 RX ADMIN — ENOXAPARIN SODIUM 40 MILLIGRAM(S): 100 INJECTION SUBCUTANEOUS at 22:03

## 2019-01-23 RX ADMIN — Medication 4: at 18:41

## 2019-01-23 RX ADMIN — Medication 4 MILLIGRAM(S): at 05:38

## 2019-01-23 RX ADMIN — SENNA PLUS 2 TABLET(S): 8.6 TABLET ORAL at 21:56

## 2019-01-23 RX ADMIN — HYDROMORPHONE HYDROCHLORIDE 30 MILLILITER(S): 2 INJECTION INTRAMUSCULAR; INTRAVENOUS; SUBCUTANEOUS at 11:17

## 2019-01-23 RX ADMIN — HYDROMORPHONE HYDROCHLORIDE 30 MILLILITER(S): 2 INJECTION INTRAMUSCULAR; INTRAVENOUS; SUBCUTANEOUS at 03:54

## 2019-01-23 RX ADMIN — HYDROMORPHONE HYDROCHLORIDE 30 MILLILITER(S): 2 INJECTION INTRAMUSCULAR; INTRAVENOUS; SUBCUTANEOUS at 08:05

## 2019-01-23 NOTE — PROGRESS NOTE ADULT - SUBJECTIVE AND OBJECTIVE BOX
Day 1 of Anesthesia Pain Management Service    SUBJECTIVE: Patient is doing well with IV PCA    Pain Scale Score:	[X] Refer to charted pain scores    THERAPY:    [ ] IV PCA Morphine		[ ] 5 mg/mL	[ ] 1 mg/mL  [X] IV PCA Hydromorphone	[ ] 5 mg/mL	[X] 1 mg/mL  [ ] IV PCA Fentanyl		[ ] 50 micrograms/mL    Demand dose: 0.2 mg     Lockout: 6 minutes   Continuous Rate: 0 mg/hr  4 Hour Limit: 4 mg    MEDICATIONS  (STANDING):  ceFAZolin   IVPB 2000 milliGRAM(s) IV Intermittent once  dexamethasone  Injectable 4 milliGRAM(s) IV Push every 6 hours  dextrose 5%. 1000 milliLiter(s) (50 mL/Hr) IV Continuous <Continuous>  dextrose 50% Injectable 12.5 Gram(s) IV Push once  dextrose 50% Injectable 25 Gram(s) IV Push once  dextrose 50% Injectable 25 Gram(s) IV Push once  docusate sodium 100 milliGRAM(s) Oral three times a day  gabapentin 300 milliGRAM(s) Oral at bedtime  HYDROmorphone PCA (1 mG/mL) 30 milliLiter(s) PCA Continuous PCA Continuous  insulin glargine Injectable (LANTUS) 5 Unit(s) SubCutaneous at bedtime  insulin lispro (HumaLOG) corrective regimen sliding scale   SubCutaneous three times a day before meals  insulin lispro (HumaLOG) corrective regimen sliding scale   SubCutaneous at bedtime  insulin lispro Injectable (HumaLOG) 4 Unit(s) SubCutaneous three times a day before meals  lactated ringers. 1000 milliLiter(s) (100 mL/Hr) IV Continuous <Continuous>  senna 2 Tablet(s) Oral at bedtime    MEDICATIONS  (PRN):  ascorbic acid 500 milliGRAM(s) Oral two times a day PRN wound healing  dextrose 40% Gel 15 Gram(s) Oral once PRN Blood Glucose LESS THAN 70 milliGRAM(s)/deciliter  glucagon  Injectable 1 milliGRAM(s) IntraMuscular once PRN Glucose LESS THAN 70 milligrams/deciliter  HYDROmorphone PCA (1 mG/mL) Rescue Clinician Bolus 0.5 milliGRAM(s) IV Push every 15 minutes PRN for Pain Scale GREATER THAN 6  magnesium hydroxide Suspension 30 milliLiter(s) Oral every 12 hours PRN Constipation  methocarbamol 750 milliGRAM(s) Oral every 6 hours PRN Back Spasms  naloxone Injectable 0.1 milliGRAM(s) IV Push every 3 minutes PRN For ANY of the following changes in patient status:  A. RR LESS THAN 10 breaths per minute, B. Oxygen saturation LESS THAN 90%, C. Sedation score of 6  ondansetron Injectable 4 milliGRAM(s) IV Push every 6 hours PRN Nausea      OBJECTIVE:    Sedation Score:	[ X] Alert	[ ] Drowsy 	[ ] Arousable	[ ] Asleep	[ ] Unresponsive    Side Effects:	[X ] None	[ ] Nausea	[ ] Vomiting	[ ] Pruritus  		[ ] Other:    Vital Signs Last 24 Hrs  T(C): 36.4 (23 Jan 2019 08:40), Max: 37 (22 Jan 2019 10:18)  T(F): 97.6 (23 Jan 2019 08:40), Max: 98.6 (22 Jan 2019 22:00)  HR: 75 (23 Jan 2019 08:40) (65 - 101)  BP: 109/70 (23 Jan 2019 08:40) (101/61 - 156/87)  BP(mean): 87 (22 Jan 2019 21:30) (87 - 116)  RR: 18 (23 Jan 2019 08:40) (14 - 20)  SpO2: 97% (23 Jan 2019 08:40) (97% - 100%)    ASSESSMENT/ PLAN    Therapy to  be:               [X] Continued   [ ] Discontinued   [ ] Changed to PRN Analgesics    Documentation and Verification of current medications:   [X] Done	[ ] Not done, not eligible    Comments: Using 1-3x/hr. HOB flat until Thursday as per surgery

## 2019-01-23 NOTE — PROGRESS NOTE ADULT - ASSESSMENT
37 y/o male with PMHx of sickle cell trait, possible TIA (2012), RBC microcytosis, chronic lower back pain c/o intermittent worsening lower back pain radiating down both legs with associated tingling in both feet. Patient reports receiving steroid injections, most recent in November with little relief. Diagnostic imaging revealed L5-S1 disc extrusion. Evaluated by Dr. Arce. Presents to PST for a scheduled L5-S1 bilateral laminotomies, possible laminectomy and excision of herniated disc on 1/15/2019.       PROCEDURE: 1/22 s/p L5 -S1 PLIF with CSF leak primarily repaired     POD#1    PLAN:  Neuro:   - pt to lie flat until tomorrow at 5 pm  - maintain HMV  - maintain ryan until pt is able to get up and OOB  - continue decadron 4 q6  - pain control- continue PCA pump, robaxin prn, gabapentin at bedtime  - continue bowel regimen  - DMT2 - poorly controlled- A1c 8.4,   - continue fingersticks with sliding scale coverage, started lantus 5 u qhs and pre meal humalog 4 u  Respiratory:   - encouraged incentive spirometry  CV:  - vital signs are stable  DVT ppx:   - venodynes, sq lovenox  PT/OT:   - TBD  after pt is able to get up and OOB    Assessment:  Please Check When Present   []  GCS  E   V  M     Heart Failure: []Acute, [] acute on chronic , []chronic  Heart Failure:  [] Diastolic (HFpEF), [] Systolic (HFrEF), []Combined (HFpEF and HFrEF), [] RHF, [] Pulm HTN, [] Other    [] ANA MARIA, [] ATN, [] AIN, [] other  [] CKD1, [] CKD2, [] CKD 3, [] CKD 4, [] CKD 5, []ESRD    Encephalopathy: [] Metabolic, [] Hepatic, [] toxic, [] Neurological, [] Other    Abnormal Nurtitional Status: [] malnurtition (see nutrition note), [ ]underweight: BMI < 19, [] morbid obesity: BMI >40, [] Cachexia    [] Sepsis  [] hypovolemic shock,[] cardiogenic shock, [] hemorrhagic shock, [] neuogenic shock  [] Acute Respiratory Failure  []Cerebral edema, [] Brain compression/ herniation,   [] Functional quadriplegia  [] Acute blood loss anemia    Spectralink # [id number] 39 y/o male with PMHx of sickle cell trait, possible TIA (2012), RBC microcytosis, chronic lower back pain c/o intermittent worsening lower back pain radiating down both legs with associated tingling in both feet. Patient reports receiving steroid injections, most recent in November with little relief. Diagnostic imaging revealed L5-S1 disc extrusion. Evaluated by Dr. Arce. Presents to PST for a scheduled L5-S1 bilateral laminotomies, possible laminectomy and excision of herniated disc on 1/15/2019.       PROCEDURE: 1/22 s/p L5 -S1 PLIF with CSF leak primarily repaired     POD#1    PLAN:  Neuro:   - pt to lie flat until tomorrow at 5 pm  - maintain HMV  - maintain ryan until pt is able to get up and OOB  - continue decadron 4 q6  - pain control- continue PCA pump, robaxin prn, gabapentin at bedtime  - continue bowel regimen  - DMT2 - poorly controlled- A1c 8.4,   - continue fingersticks with sliding scale coverage, started lantus 5 u qhs and pre meal humalog 4 u  - Hospitalist consult for medial management  Respiratory:   - encouraged incentive spirometry  CV:  - vital signs are stable  DVT ppx:   - venodynes, sq lovenox  PT/OT:   - TBD  after pt is able to get up and OOB    Assessment:  Please Check When Present   []  GCS  E   V  M     Heart Failure: []Acute, [] acute on chronic , []chronic  Heart Failure:  [] Diastolic (HFpEF), [] Systolic (HFrEF), []Combined (HFpEF and HFrEF), [] RHF, [] Pulm HTN, [] Other    [] ANA MARIA, [] ATN, [] AIN, [] other  [] CKD1, [] CKD2, [] CKD 3, [] CKD 4, [] CKD 5, []ESRD    Encephalopathy: [] Metabolic, [] Hepatic, [] toxic, [] Neurological, [] Other    Abnormal Nurtitional Status: [] malnurtition (see nutrition note), [ ]underweight: BMI < 19, [] morbid obesity: BMI >40, [] Cachexia    [] Sepsis  [] hypovolemic shock,[] cardiogenic shock, [] hemorrhagic shock, [] neuogenic shock  [] Acute Respiratory Failure  []Cerebral edema, [] Brain compression/ herniation,   [] Functional quadriplegia  [] Acute blood loss anemia    Spectralink # [id number]

## 2019-01-23 NOTE — PROGRESS NOTE ADULT - SUBJECTIVE AND OBJECTIVE BOX
SUBJECTIVE: Pt seen and examined, lying flat in bed until tomorrow at 5 pm, he reports pain to the left side ofhis back incision. He also reports sternal pain and ant thigh pain likely from positioning    OVERNIGHT EVENTS: none    Vital Signs Last 24 Hrs  T(C): 36.4 (23 Jan 2019 08:40), Max: 37 (22 Jan 2019 22:00)  T(F): 97.6 (23 Jan 2019 08:40), Max: 98.6 (22 Jan 2019 22:00)  HR: 75 (23 Jan 2019 08:40) (65 - 101)  BP: 109/70 (23 Jan 2019 08:40) (101/61 - 156/87)  BP(mean): 87 (22 Jan 2019 21:30) (87 - 116)  RR: 18 (23 Jan 2019 08:40) (14 - 20)  SpO2: 97% (23 Jan 2019 08:40) (97% - 100%)    PHYSICAL EXAM:    General: No Acute Distress     Neurological: Awake, alert oriented to person, place and time, Following Commands, Muscle Strength normal in all four extremities while lying flat in bed, No Drift, Sensation to Light Touch Intact    Pulmonary: Clear to Auscultation, No Rales, No Rhonchi, No Wheezes     Cardiovascular: S1, S2, Regular Rate and Rhythm     Gastrointestinal: Soft, Nontender, Nondistended     Incision: back dressing c/d/i    LABS:                        13.4   17.35 )-----------( 287      ( 23 Jan 2019 09:08 )             39.4    01-23    142  |  104  |  7   ----------------------------<  237<H>  4.2   |  27  |  0.88    Ca    8.5      23 Jan 2019 07:01      Hemoglobin A1C, Whole Blood: 8.4 % (01-08 @ 21:16)      01-22 @ 07:01  -  01-23 @ 07:00  --------------------------------------------------------  IN: 1850 mL / OUT: 1520 mL / NET: 330 mL    01-23 @ 07:01 - 01-23 @ 13:27  --------------------------------------------------------  IN: 600 mL / OUT: 1800 mL / NET: -1200 mL      DRAINS:  x 24 hrs    MEDICATIONS:  Antibiotics:  ceFAZolin   IVPB 2000 milliGRAM(s) IV Intermittent once    Neuro:  gabapentin 300 milliGRAM(s) Oral at bedtime  HYDROmorphone PCA (1 mG/mL) 30 milliLiter(s) PCA Continuous PCA Continuous  HYDROmorphone PCA (1 mG/mL) Rescue Clinician Bolus 0.5 milliGRAM(s) IV Push every 15 minutes PRN for Pain Scale GREATER THAN 6  methocarbamol 750 milliGRAM(s) Oral every 6 hours PRN Back Spasms  ondansetron Injectable 4 milliGRAM(s) IV Push every 6 hours PRN Nausea    Cardiac:    Pulm:    GI/:  docusate sodium 100 milliGRAM(s) Oral three times a day  magnesium hydroxide Suspension 30 milliLiter(s) Oral every 12 hours PRN Constipation  senna 2 Tablet(s) Oral at bedtime    Other:   ascorbic acid 500 milliGRAM(s) Oral two times a day PRN wound healing  dexamethasone  Injectable 4 milliGRAM(s) IV Push every 6 hours  dextrose 40% Gel 15 Gram(s) Oral once PRN Blood Glucose LESS THAN 70 milliGRAM(s)/deciliter  dextrose 5%. 1000 milliLiter(s) IV Continuous <Continuous>  dextrose 50% Injectable 12.5 Gram(s) IV Push once  dextrose 50% Injectable 25 Gram(s) IV Push once  dextrose 50% Injectable 25 Gram(s) IV Push once  enoxaparin Injectable 40 milliGRAM(s) SubCutaneous at bedtime  glucagon  Injectable 1 milliGRAM(s) IntraMuscular once PRN Glucose LESS THAN 70 milligrams/deciliter  insulin glargine Injectable (LANTUS) 5 Unit(s) SubCutaneous at bedtime  insulin lispro (HumaLOG) corrective regimen sliding scale   SubCutaneous three times a day before meals  insulin lispro (HumaLOG) corrective regimen sliding scale   SubCutaneous at bedtime  insulin lispro Injectable (HumaLOG) 4 Unit(s) SubCutaneous three times a day before meals  lactated ringers. 1000 milliLiter(s) IV Continuous <Continuous>  naloxone Injectable 0.1 milliGRAM(s) IV Push every 3 minutes PRN For ANY of the following changes in patient status:  A. RR LESS THAN 10 breaths per minute, B. Oxygen saturation LESS THAN 90%, C. Sedation score of 6    DIET: [] Regular [] CCD [] Renal [] Puree [] Dysphagia [] Tube Feeds:     IMAGING:   < from: CT Lumbar Spine No Cont (01.14.19 @ 16:35) >  COMPARISON EXAMINATION: MR scans dated 1/11/2019 and 7/14/2014      FINDINGS:    VERTEBRAL BODIES AND DISCS:  Vertebral bodies are normal in height.   Discogenic changes are seen at the L5-S1 level with marginal and facial   epidural osteophyte formation. L5-S1 disc space narrowing and vacuum   phenomenon is seen. Mild L4-L5 disc space narrowing. Intervertebral discs   are otherwise normal in height.  ALIGNMENT:  No subluxations.  L1-L2 LEVEL:  Normal.  L2-L3 LEVEL:  Normal.  L3-L4 LEVEL:  Normal.  L4-L5 LEVEL:  Diffuse disc bulge with small superimposed central disc   protrusion and bilateral facet arthrosis. No significant spinal stenosis.  L5-S1 LEVEL:  Central disc protrusion/ridge with bilateral facet   arthrosis. Mild to moderate bilateral foraminal narrowing.  SPINAL CANAL:  No other intradural or extradural defects are seen.   MISCELLANEOUS:  None.    IMPRESSION:  No significant interval change when compared with the most   recent MR scan. Nerve root thickening and clumping seen previously cannot   be adequately evaluated on the current exam.    Spondylitic changes at the L4-L5 and L5-S1 levels with disc protrusions   most prominent at the L5-S1 level.    < end of copied text >

## 2019-01-23 NOTE — CONSULT NOTE ADULT - SUBJECTIVE AND OBJECTIVE BOX
CC: back pain    HPI:  39 y/o male with PMHx of sickle cell trait, possible TIA (2012- work up negative), RBC microcytosis, DM2, chronic lower back pain c/o intermittent worsening lower back pain radiating down both legs with associated tingling in both feet. Patient reports receiving steroid injections, most recent in November with little relief. Diagnostic imaging revealed L5-S1 disc extrusion. Patient is s/p bilateral L5-S1 TLIF with pedical screw fixation with intraop CSF leak with primary repair (EBL 400cc). Patient currently reports back pain is managed with PCA dilaudid. + flatus. No cough, SOB.    PMD: Dr. Shaniqua Mae [phone]    PAST MEDICAL & SURGICAL HISTORY:  TIA (transient ischemic attack): possible, ~2012  RBC microcytosis  Sickle cell trait  Disc displacement, lumbar  T2DM (type 2 diabetes mellitus)  H/O pilonidal cyst  H/O colonoscopy  Cyst of skin: back, excision      Review of Systems:   CONSTITUTIONAL: No fever, chills  HEENT: No sore throat, vision changes  RESPIRATORY: No cough, wheezing, shortness of breath  CARDIOVASCULAR: No chest pain, palpitations, leg edema  GASTROINTESTINAL: No abdominal pain, nausea, vomiting, diarrhea or constipation. No melena or hematochezia.  GENITOURINARY: No dysuria, frequency, hematuria  NEUROLOGICAL: No headaches, memory loss, loss of strength, numbness, or tremors  SKIN: No itching, burning, rashes, or lesions   MUSCULOSKELETAL: + back pain  PSYCHIATRIC: No depression, anxiety  HEME/LYMPH: No easy bruising, or bleeding gums      Allergies    No Known Allergies    Intolerances        Social History:   ex smoker  social EtOH use    FAMILY HISTORY:  Father with CVA, MI    HOME MEDS:  advil prn  Metformin 500mg QD  Gabapentin 300mg QD    MEDICATIONS  (STANDING):  ceFAZolin   IVPB 2000 milliGRAM(s) IV Intermittent once  dexamethasone  Injectable 4 milliGRAM(s) IV Push every 6 hours  dextrose 5%. 1000 milliLiter(s) (50 mL/Hr) IV Continuous <Continuous>  dextrose 50% Injectable 12.5 Gram(s) IV Push once  dextrose 50% Injectable 25 Gram(s) IV Push once  dextrose 50% Injectable 25 Gram(s) IV Push once  docusate sodium 100 milliGRAM(s) Oral three times a day  gabapentin 300 milliGRAM(s) Oral at bedtime  HYDROmorphone PCA (1 mG/mL) 30 milliLiter(s) PCA Continuous PCA Continuous  insulin glargine Injectable (LANTUS) 5 Unit(s) SubCutaneous at bedtime  insulin lispro (HumaLOG) corrective regimen sliding scale   SubCutaneous three times a day before meals  insulin lispro (HumaLOG) corrective regimen sliding scale   SubCutaneous at bedtime  insulin lispro Injectable (HumaLOG) 4 Unit(s) SubCutaneous three times a day before meals  lactated ringers. 1000 milliLiter(s) (100 mL/Hr) IV Continuous <Continuous>  senna 2 Tablet(s) Oral at bedtime    MEDICATIONS  (PRN):  ascorbic acid 500 milliGRAM(s) Oral two times a day PRN wound healing  dextrose 40% Gel 15 Gram(s) Oral once PRN Blood Glucose LESS THAN 70 milliGRAM(s)/deciliter  glucagon  Injectable 1 milliGRAM(s) IntraMuscular once PRN Glucose LESS THAN 70 milligrams/deciliter  HYDROmorphone PCA (1 mG/mL) Rescue Clinician Bolus 0.5 milliGRAM(s) IV Push every 15 minutes PRN for Pain Scale GREATER THAN 6  magnesium hydroxide Suspension 30 milliLiter(s) Oral every 12 hours PRN Constipation  methocarbamol 750 milliGRAM(s) Oral every 6 hours PRN Back Spasms  naloxone Injectable 0.1 milliGRAM(s) IV Push every 3 minutes PRN For ANY of the following changes in patient status:  A. RR LESS THAN 10 breaths per minute, B. Oxygen saturation LESS THAN 90%, C. Sedation score of 6  ondansetron Injectable 4 milliGRAM(s) IV Push every 6 hours PRN Nausea      Vital Signs Last 24 Hrs  T(C): 36.4 (23 Jan 2019 08:40), Max: 37 (22 Jan 2019 22:00)  T(F): 97.6 (23 Jan 2019 08:40), Max: 98.6 (22 Jan 2019 22:00)  HR: 75 (23 Jan 2019 08:40) (65 - 101)  BP: 109/70 (23 Jan 2019 08:40) (101/61 - 156/87)  BP(mean): 87 (22 Jan 2019 21:30) (87 - 116)  RR: 18 (23 Jan 2019 08:40) (14 - 20)  SpO2: 97% (23 Jan 2019 08:40) (97% - 100%)  CAPILLARY BLOOD GLUCOSE      POCT Blood Glucose.: 215 mg/dL (23 Jan 2019 07:49)  POCT Blood Glucose.: 228 mg/dL (22 Jan 2019 21:32)  POCT Blood Glucose.: 223 mg/dL (22 Jan 2019 14:56)    I&O's Summary    22 Jan 2019 07:01  -  23 Jan 2019 07:00  --------------------------------------------------------  IN: 1850 mL / OUT: 1520 mL / NET: 330 mL    23 Jan 2019 07:01  -  23 Jan 2019 12:05  --------------------------------------------------------  IN: 0 mL / OUT: 1800 mL / NET: -1800 mL        PHYSICAL EXAM:  GENERAL: NAD  HEENT: neck supple  LUNG: Clear to auscultation bilaterally; No wheeze  HEART: S1, S2  ABDOMEN: Soft, Nontender, Nondistended, Bowel sounds present  EXTREMITIES: No leg edema  PSYCH: normal affect, calm  NEUROLOGY: AAO x3, moves legs  SKIN: warm and dry    LABS:                        13.4   17.35 )-----------( 287      ( 23 Jan 2019 09:08 )             39.4     01-23    142  |  104  |  7   ----------------------------<  237<H>  4.2   |  27  |  0.88    Ca    8.5      23 Jan 2019 07:01                RADIOLOGY & ADDITIONAL TESTS:    Imaging Personally Reviewed:    < from: CT Lumbar Spine No Cont (01.14.19 @ 16:35) >    IMPRESSION:  No significant interval change when compared with the most   recent MR scan. Nerve root thickening and clumping seen previously cannot   be adequately evaluated on the current exam.    Spondylitic changes at the L4-L5 and L5-S1 levels with disc protrusions   most prominent at the L5-S1 level.    < end of copied text >    Consultant(s) Notes Reviewed:      Care Discussed with Consultants/Other Providers: neurosurgery PA

## 2019-01-23 NOTE — CONSULT NOTE ADULT - ASSESSMENT
39 y/o male with PMHx of sickle cell trait, possible TIA (2012- work up negative), DM2, with worsening lower back pain and radiculopathy s/p bilateral L5-S1 TLIF with pedical screw fixation with intraop CSF leak with primary repair (EBL 400cc).

## 2019-01-23 NOTE — PROGRESS NOTE ADULT - ATTENDING COMMENTS
Pt afebrile, was having back pain and rx with PCA.  He stated nausea when he turned and had headache when head was more elevated.  HV with sanguinous output.  MASTON with good strength.  Dressing dry.  Can get up slowly tomorrow evening.  Continue flat to 20 degrees, hydration.

## 2019-01-23 NOTE — PROGRESS NOTE ADULT - SUBJECTIVE AND OBJECTIVE BOX
Pain Management Attending Addendum    SUBJECTIVE:    Therapy:	  [ x] IV PCA	   [ ] Epidural           [ ] s/p Spinal Opoid              [ ] Postpartum infusion	  [ ] Patient controlled regional anesthesia (PCRA)    [ ] prn Analgesics    OBJECTIVE:   [ x] No new signs     [ ] Other:    Side Effects:  [x ] None			[ ] Other:    Assessment of Catheter Site:		[ ] Intact		[ ] Other:    ASSESSMENT/PLAN  [ x] Continue current therapy    [ ] Therapy changed to:    [ ] IV PCA       [ ] Epidural     [ ] prn Analgesics     [ ] post partum infusion    Comments:

## 2019-01-23 NOTE — CONSULT NOTE ADULT - PROBLEM SELECTOR RECOMMENDATION 2
HbA1c 8.4  patient also no steroid  start lantus 5u, premeal humalog 4u TID  monitor FS glucose  will likely need to increase metformin to BID on discharge.

## 2019-01-24 LAB
ANION GAP SERPL CALC-SCNC: 13 MMOL/L — SIGNIFICANT CHANGE UP (ref 5–17)
BASOPHILS # BLD AUTO: 0.02 K/UL — SIGNIFICANT CHANGE UP (ref 0–0.2)
BASOPHILS NFR BLD AUTO: 0.1 % — SIGNIFICANT CHANGE UP (ref 0–2)
BUN SERPL-MCNC: 11 MG/DL — SIGNIFICANT CHANGE UP (ref 7–23)
CALCIUM SERPL-MCNC: 9 MG/DL — SIGNIFICANT CHANGE UP (ref 8.4–10.5)
CHLORIDE SERPL-SCNC: 104 MMOL/L — SIGNIFICANT CHANGE UP (ref 96–108)
CO2 SERPL-SCNC: 26 MMOL/L — SIGNIFICANT CHANGE UP (ref 22–31)
CREAT SERPL-MCNC: 0.85 MG/DL — SIGNIFICANT CHANGE UP (ref 0.5–1.3)
EOSINOPHIL # BLD AUTO: 0 K/UL — SIGNIFICANT CHANGE UP (ref 0–0.5)
EOSINOPHIL NFR BLD AUTO: 0 % — SIGNIFICANT CHANGE UP (ref 0–6)
GLUCOSE BLDC GLUCOMTR-MCNC: 150 MG/DL — HIGH (ref 70–99)
GLUCOSE BLDC GLUCOMTR-MCNC: 154 MG/DL — HIGH (ref 70–99)
GLUCOSE BLDC GLUCOMTR-MCNC: 195 MG/DL — HIGH (ref 70–99)
GLUCOSE BLDC GLUCOMTR-MCNC: 199 MG/DL — HIGH (ref 70–99)
GLUCOSE SERPL-MCNC: 258 MG/DL — HIGH (ref 70–99)
HCT VFR BLD CALC: 39 % — SIGNIFICANT CHANGE UP (ref 39–50)
HGB BLD-MCNC: 13.4 G/DL — SIGNIFICANT CHANGE UP (ref 13–17)
IMM GRANULOCYTES NFR BLD AUTO: 0.7 % — SIGNIFICANT CHANGE UP (ref 0–1.5)
LYMPHOCYTES # BLD AUTO: 12.3 % — LOW (ref 13–44)
LYMPHOCYTES # BLD AUTO: 2.39 K/UL — SIGNIFICANT CHANGE UP (ref 1–3.3)
MCHC RBC-ENTMCNC: 26.7 PG — LOW (ref 27–34)
MCHC RBC-ENTMCNC: 34.4 GM/DL — SIGNIFICANT CHANGE UP (ref 32–36)
MCV RBC AUTO: 77.8 FL — LOW (ref 80–100)
MONOCYTES # BLD AUTO: 1.47 K/UL — HIGH (ref 0–0.9)
MONOCYTES NFR BLD AUTO: 7.6 % — SIGNIFICANT CHANGE UP (ref 2–14)
NEUTROPHILS # BLD AUTO: 15.45 K/UL — HIGH (ref 1.8–7.4)
NEUTROPHILS NFR BLD AUTO: 79.3 % — HIGH (ref 43–77)
PLATELET # BLD AUTO: 286 K/UL — SIGNIFICANT CHANGE UP (ref 150–400)
POTASSIUM SERPL-MCNC: 4 MMOL/L — SIGNIFICANT CHANGE UP (ref 3.5–5.3)
POTASSIUM SERPL-SCNC: 4 MMOL/L — SIGNIFICANT CHANGE UP (ref 3.5–5.3)
RBC # BLD: 5.01 M/UL — SIGNIFICANT CHANGE UP (ref 4.2–5.8)
RBC # FLD: 14.1 % — SIGNIFICANT CHANGE UP (ref 10.3–14.5)
SODIUM SERPL-SCNC: 143 MMOL/L — SIGNIFICANT CHANGE UP (ref 135–145)
WBC # BLD: 19.46 K/UL — HIGH (ref 3.8–10.5)
WBC # FLD AUTO: 19.46 K/UL — HIGH (ref 3.8–10.5)

## 2019-01-24 PROCEDURE — 99232 SBSQ HOSP IP/OBS MODERATE 35: CPT

## 2019-01-24 RX ORDER — INSULIN GLARGINE 100 [IU]/ML
7 INJECTION, SOLUTION SUBCUTANEOUS AT BEDTIME
Qty: 0 | Refills: 0 | Status: DISCONTINUED | OUTPATIENT
Start: 2019-01-24 | End: 2019-01-25

## 2019-01-24 RX ORDER — ACETAMINOPHEN 500 MG
1000 TABLET ORAL ONCE
Qty: 0 | Refills: 0 | Status: DISCONTINUED | OUTPATIENT
Start: 2019-01-24 | End: 2019-01-25

## 2019-01-24 RX ORDER — POLYETHYLENE GLYCOL 3350 17 G/17G
17 POWDER, FOR SOLUTION ORAL DAILY
Qty: 0 | Refills: 0 | Status: DISCONTINUED | OUTPATIENT
Start: 2019-01-24 | End: 2019-01-29

## 2019-01-24 RX ADMIN — HYDROMORPHONE HYDROCHLORIDE 30 MILLILITER(S): 2 INJECTION INTRAMUSCULAR; INTRAVENOUS; SUBCUTANEOUS at 11:08

## 2019-01-24 RX ADMIN — Medication 500 MILLIGRAM(S): at 22:25

## 2019-01-24 RX ADMIN — Medication 4 UNIT(S): at 08:36

## 2019-01-24 RX ADMIN — Medication 100 MILLIGRAM(S): at 13:55

## 2019-01-24 RX ADMIN — Medication 4 UNIT(S): at 13:54

## 2019-01-24 RX ADMIN — INSULIN GLARGINE 7 UNIT(S): 100 INJECTION, SOLUTION SUBCUTANEOUS at 22:26

## 2019-01-24 RX ADMIN — Medication 4 UNIT(S): at 18:22

## 2019-01-24 RX ADMIN — HYDROMORPHONE HYDROCHLORIDE 30 MILLILITER(S): 2 INJECTION INTRAMUSCULAR; INTRAVENOUS; SUBCUTANEOUS at 19:47

## 2019-01-24 RX ADMIN — HYDROMORPHONE HYDROCHLORIDE 30 MILLILITER(S): 2 INJECTION INTRAMUSCULAR; INTRAVENOUS; SUBCUTANEOUS at 14:07

## 2019-01-24 RX ADMIN — HYDROMORPHONE HYDROCHLORIDE 30 MILLILITER(S): 2 INJECTION INTRAMUSCULAR; INTRAVENOUS; SUBCUTANEOUS at 07:35

## 2019-01-24 RX ADMIN — HYDROMORPHONE HYDROCHLORIDE 30 MILLILITER(S): 2 INJECTION INTRAMUSCULAR; INTRAVENOUS; SUBCUTANEOUS at 18:23

## 2019-01-24 RX ADMIN — HYDROMORPHONE HYDROCHLORIDE 30 MILLILITER(S): 2 INJECTION INTRAMUSCULAR; INTRAVENOUS; SUBCUTANEOUS at 03:22

## 2019-01-24 RX ADMIN — Medication 100 MILLIGRAM(S): at 22:25

## 2019-01-24 RX ADMIN — Medication 2: at 13:54

## 2019-01-24 RX ADMIN — GABAPENTIN 300 MILLIGRAM(S): 400 CAPSULE ORAL at 22:26

## 2019-01-24 RX ADMIN — Medication 1000 MILLIGRAM(S): at 00:30

## 2019-01-24 RX ADMIN — Medication 2: at 08:35

## 2019-01-24 RX ADMIN — HYDROMORPHONE HYDROCHLORIDE 30 MILLILITER(S): 2 INJECTION INTRAMUSCULAR; INTRAVENOUS; SUBCUTANEOUS at 22:31

## 2019-01-24 RX ADMIN — Medication 2: at 18:22

## 2019-01-24 RX ADMIN — ENOXAPARIN SODIUM 40 MILLIGRAM(S): 100 INJECTION SUBCUTANEOUS at 22:25

## 2019-01-24 RX ADMIN — SENNA PLUS 2 TABLET(S): 8.6 TABLET ORAL at 22:26

## 2019-01-24 NOTE — CHART NOTE - NSCHARTNOTEFT_GEN_A_CORE
CAPRINI SCORE [CLOT] Score on Admission for     AGE RELATED RISK FACTORS                                                       MOBILITY RELATED FACTORS  [ ] Age 41-60 years                                            (1 Point)                  [ ] Bed rest                                                        (1 Point)  [ ] Age: 61-74 years                                           (2 Points)                 [ ] Plaster cast                                                   (2 Points)  [ ] Age= 75 years                                              (3 Points)                 [ ] Bed bound for more than 72 hours                 (2 Points)    DISEASE RELATED RISK FACTORS                                               GENDER SPECIFIC FACTORS  [ ] Edema in the lower extremities                       (1 Point)                  [ ] Pregnancy                                                     (1 Point)  [ ] Varicose veins                                               (1 Point)                  [ ] Post-partum < 6 weeks                                   (1 Point)             [x] BMI > 25 Kg/m2                                            (1 Point)                  [ ] Hormonal therapy  or oral contraception          (1 Point)                 [ ] Sepsis (in the previous month)                        (1 Point)                  [ ] History of pregnancy complications                 (1 point)  [ ] Pneumonia or serious lung disease                                               [ ] Unexplained or recurrent                     (1 Point)           (in the previous month)                               (1 Point)  [ ] Abnormal pulmonary function test                     (1 Point)                 SURGERY RELATED RISK FACTORS (include planned surgeries)  [ ] Acute myocardial infarction                              (1 Point)                 [ ]  Section                                             (1 Point)  [ ] Congestive heart failure (in the previous month)  (1 Point)               [ ] Minor surgery                                                  (1 Point)   [ ] Inflammatory bowel disease                             (1 Point)                 [ ] Arthroscopic surgery                                        (2 Points)  [ ] Central venous access                                      (2 Points)                [x] Surgery lasting more than 45 minutes               (2 Points)       [ ] Stroke (in the previous month)                          (5 Points)               [ ] Elective arthroplasty                                         (5 Points)            [ ] current or past malignancy                              (2 Points)                                                                                                       HEMATOLOGY RELATED FACTORS                                                 TRAUMA RELATED RISK FACTORS  [ ] Prior episodes of VTE                                     (3 Points)                [ ] Fracture of the hip, pelvis, or leg                       (5 Points)  [ ] Positive family history for VTE                         (3 Points)                 [ ] Acute spinal cord injury (in the previous month)  (5 Points)  [ ] Prothrombin 87127 A                                     (3 Points)                 [ ] Paralysis  (less than 1 month)                             (5 Points)  [ ] Factor V Leiden                                             (3 Points)                  [ ] Multiple Trauma within 1 month                        (5 Points)  [ ] Lupus anticoagulants                                     (3 Points)                                                           [ ] Anticardiolipin antibodies                               (3 Points)                                                       [ ] High homocysteine in the blood                      (3 Points)                                             [ ] Other congenital or acquired thrombophilia      (3 Points)                                                [ ] Heparin induced thrombocytopenia                  (3 Points)                                          Total Score [3]    Risk:  Very low 0   Low 1 to 2   Moderate 3 to 4   High =5       VTE Prophylasix Recommednations:  [x] mechanical pneumatic compression devices                                      [ ] contraindicated: _____________________  [x] chemo prophylasix                                                                                   [ ] contraindicated _____________________    **** HIGH LIKELIHOOD DVT PRESENT ON ADMISSION  [x] (please order LE dopplers within 24 hours of admission)

## 2019-01-24 NOTE — PROGRESS NOTE ADULT - ASSESSMENT
39 y/o male with PMHx of sickle cell trait, possible TIA (2012- work up negative), DM2, with worsening lower back pain and radiculopathy s/p bilateral L5-S1 TLIF with pedical screw fixation with intraop CSF leak with primary repair (EBL 400cc). 37 y/o male with PMHx of sickle cell trait, possible TIA (2012- work up negative), DM2, with worsening lower back pain and radiculopathy s/p bilateral L5-S1 TLIF with pedical screw fixation with intraop CSF leak with primary repair (EBL 400cc) on 1/23.

## 2019-01-24 NOTE — PROGRESS NOTE ADULT - PROBLEM SELECTOR PLAN 1
postop management per neurosurgery  incentive spirometry  bowel regime- add miralax postop management per neurosurgery  incentive spirometry  bowel regimen- add miralax

## 2019-01-24 NOTE — PROGRESS NOTE ADULT - ASSESSMENT
HPI:  Patient is a 38 year old male with low back pain that radiated to both lower extremities with associated tingling in the feet.  Now presented for surgery.    PROCEDURE: s/p L5-S1 posterior lumbar interbody fusion on 1/22/2019   POD#2    PLAN:  -dilaudid pca and gabapentin for pain control  -robaxin for muscle spasms  -senna, colace, and magnesium hydroxide for bowel regimen  -lantus, lispro, and HISS for glucose control  -lovenox and SCDs for DVT prophylaxis  -consistent carbohydrate diet  -hemovac removed by neurosurgery resident with no complications  -continue aquacel dressing, remove 1/25/2019 AM  -keep flat until 5pm tonight, can start raising head of bed at that time  -pt eval tomorrow  -incentive spirometer for lung expansion  -am labs    Spectra #[id number]            Assessment:  Please Check When Present   []  GCS  E   V  M     Heart Failure: []Acute, [] acute on chronic , []chronic  Heart Failure:  [] Diastolic (HFpEF), [] Systolic (HFrEF), []Combined (HFpEF and HFrEF), [] RHF, [] Pulm HTN, [] Other    [] ANA MARIA, [] ATN, [] AIN, [] other  [] CKD1, [] CKD2, [] CKD 3, [] CKD 4, [] CKD 5, []ESRD    Encephalopathy: [] Metabolic, [] Hepatic, [] toxic, [] Neurological, [] Other    Abnormal Nurtitional Status: [] malnurtition (see nutrition note), [ ]underweight: BMI < 19, [] morbid obesity: BMI >40, [] Cachexia    [] Sepsis  [] hypovolemic shock,[] cardiogenic shock, [] hemorrhagic shock, [] neuogenic shock  [] Acute Respiratory Failure  []Cerebral edema, [] Brain compression/ herniation,   [] Functional quadriplegia  [] Acute blood loss anemia

## 2019-01-24 NOTE — PROGRESS NOTE ADULT - SUBJECTIVE AND OBJECTIVE BOX
HPI:  Patient is a 38 year old male with low back pain that radiated to both lower extremities with associated tingling in the feet.  Now presented for surgery.    OVERNIGHT EVENTS:  Patient had increased drainage from hemovac, output lighter in color.  Still flat in bed, can sit up this evening.  Incisional pain better controlled today on dilaudid pca.  Hospitalist following for elevated blood glucose.      Vital Signs Last 24 Hrs  T(C): 37.1 (24 Jan 2019 05:22), Max: 37.1 (24 Jan 2019 05:22)  T(F): 98.7 (24 Jan 2019 05:22), Max: 98.7 (24 Jan 2019 05:22)  HR: 72 (24 Jan 2019 05:22) (66 - 82)  BP: 113/67 (24 Jan 2019 05:22) (107/70 - 118/72)  BP(mean): --  RR: 18 (24 Jan 2019 05:22) (18 - 18)  SpO2: 98% (24 Jan 2019 05:22) (98% - 99%)        PHYSICAL EXAM:  Neurological: awake, alert, oriented x3, follows commands, speech clear and fluent, moves all extremities x4 w/ 5/5 strength throughout, sensation present, intact, equal throughout    Cardiovascular: +s1, s2  Respiratory: clear to auscultation b/l  Gastrointestinal: soft, non-distended, non-tender  Genitourinary: +ryan  Incision/Wound: posterior midline vertical incision dressing c/d/i w/ aquacel, hemovac x1    TUBES/LINES:  [x] hemovac x1 (405cc serosanguinous (more serous) per 24 hours)    DIET:  [x] consistent carbohydrate    LABS:                        13.4   19.46 )-----------( 286      ( 24 Jan 2019 08:14 )             39.0     01-24    143  |  104  |  11  ----------------------------<  258<H>  4.0   |  26  |  0.85    Ca    9.0      24 Jan 2019 07:12        CAPILLARY BLOOD GLUCOSE      POCT Blood Glucose.: 199 mg/dL (24 Jan 2019 08:17)  POCT Blood Glucose.: 232 mg/dL (23 Jan 2019 21:53)  POCT Blood Glucose.: 229 mg/dL (23 Jan 2019 18:10)  POCT Blood Glucose.: 212 mg/dL (23 Jan 2019 14:29)  POCT Blood Glucose.: 233 mg/dL (23 Jan 2019 13:24)        Allergies    No Known Allergies        MEDICATIONS:  Antibiotics:  ceFAZolin   IVPB 2000 milliGRAM(s) IV Intermittent once    Neuro:  acetaminophen  IVPB .. 1000 milliGRAM(s) IV Intermittent once  gabapentin 300 milliGRAM(s) Oral at bedtime  HYDROmorphone PCA (1 mG/mL) 30 milliLiter(s) PCA Continuous PCA Continuous  HYDROmorphone PCA (1 mG/mL) Rescue Clinician Bolus 0.5 milliGRAM(s) IV Push every 15 minutes PRN  methocarbamol 750 milliGRAM(s) Oral every 6 hours PRN  ondansetron Injectable 4 milliGRAM(s) IV Push every 6 hours PRN    Anticoagulation:  enoxaparin Injectable 40 milliGRAM(s) SubCutaneous at bedtime    OTHER:  dexamethasone  Injectable 4 milliGRAM(s) IV Push every 6 hours  dextrose 40% Gel 15 Gram(s) Oral once PRN  dextrose 50% Injectable 12.5 Gram(s) IV Push once  dextrose 50% Injectable 25 Gram(s) IV Push once  dextrose 50% Injectable 25 Gram(s) IV Push once  docusate sodium 100 milliGRAM(s) Oral three times a day  glucagon  Injectable 1 milliGRAM(s) IntraMuscular once PRN  insulin glargine Injectable (LANTUS) 5 Unit(s) SubCutaneous at bedtime  insulin lispro (HumaLOG) corrective regimen sliding scale   SubCutaneous three times a day before meals  insulin lispro (HumaLOG) corrective regimen sliding scale   SubCutaneous at bedtime  insulin lispro Injectable (HumaLOG) 4 Unit(s) SubCutaneous three times a day before meals  magnesium hydroxide Suspension 30 milliLiter(s) Oral every 12 hours PRN  naloxone Injectable 0.1 milliGRAM(s) IV Push every 3 minutes PRN  senna 2 Tablet(s) Oral at bedtime    IVF:  dextrose 5%. 1000 milliLiter(s) IV Continuous <Continuous>  lactated ringers. 1000 milliLiter(s) IV Continuous <Continuous>    CULTURES:  none    RADIOLOGY & ADDITIONAL TESTS:  no new imaging

## 2019-01-24 NOTE — PROGRESS NOTE ADULT - SUBJECTIVE AND OBJECTIVE BOX
Day 2 of Anesthesia Pain Management Service    SUBJECTIVE: Patient is doing well with IV PCA    Pain Scale Score:	[X] Refer to charted pain scores    THERAPY:    [ ] IV PCA Morphine		[ ] 5 mg/mL	[ ] 1 mg/mL  [X] IV PCA Hydromorphone	[ ] 5 mg/mL	[X] 1 mg/mL  [ ] IV PCA Fentanyl		[ ] 50 micrograms/mL    Demand dose: 0.2 mg     Lockout: 6 minutes   Continuous Rate: 0 mg/hr  4 Hour Limit: 4 mg    MEDICATIONS  (STANDING):  acetaminophen  IVPB .. 1000 milliGRAM(s) IV Intermittent once  ceFAZolin   IVPB 2000 milliGRAM(s) IV Intermittent once  dexamethasone  Injectable 4 milliGRAM(s) IV Push every 6 hours  dextrose 5%. 1000 milliLiter(s) (50 mL/Hr) IV Continuous <Continuous>  dextrose 50% Injectable 12.5 Gram(s) IV Push once  dextrose 50% Injectable 25 Gram(s) IV Push once  dextrose 50% Injectable 25 Gram(s) IV Push once  docusate sodium 100 milliGRAM(s) Oral three times a day  enoxaparin Injectable 40 milliGRAM(s) SubCutaneous at bedtime  gabapentin 300 milliGRAM(s) Oral at bedtime  HYDROmorphone PCA (1 mG/mL) 30 milliLiter(s) PCA Continuous PCA Continuous  insulin glargine Injectable (LANTUS) 5 Unit(s) SubCutaneous at bedtime  insulin lispro (HumaLOG) corrective regimen sliding scale   SubCutaneous three times a day before meals  insulin lispro (HumaLOG) corrective regimen sliding scale   SubCutaneous at bedtime  insulin lispro Injectable (HumaLOG) 4 Unit(s) SubCutaneous three times a day before meals  lactated ringers. 1000 milliLiter(s) (100 mL/Hr) IV Continuous <Continuous>  senna 2 Tablet(s) Oral at bedtime    MEDICATIONS  (PRN):  ascorbic acid 500 milliGRAM(s) Oral two times a day PRN wound healing  dextrose 40% Gel 15 Gram(s) Oral once PRN Blood Glucose LESS THAN 70 milliGRAM(s)/deciliter  glucagon  Injectable 1 milliGRAM(s) IntraMuscular once PRN Glucose LESS THAN 70 milligrams/deciliter  HYDROmorphone PCA (1 mG/mL) Rescue Clinician Bolus 0.5 milliGRAM(s) IV Push every 15 minutes PRN for Pain Scale GREATER THAN 6  magnesium hydroxide Suspension 30 milliLiter(s) Oral every 12 hours PRN Constipation  methocarbamol 750 milliGRAM(s) Oral every 6 hours PRN Back Spasms  naloxone Injectable 0.1 milliGRAM(s) IV Push every 3 minutes PRN For ANY of the following changes in patient status:  A. RR LESS THAN 10 breaths per minute, B. Oxygen saturation LESS THAN 90%, C. Sedation score of 6  ondansetron Injectable 4 milliGRAM(s) IV Push every 6 hours PRN Nausea      OBJECTIVE:    Sedation Score:	[ X] Alert	[ ] Drowsy 	[ ] Arousable	[ ] Asleep	[ ] Unresponsive    Side Effects:	[X ] None	[ ] Nausea	[ ] Vomiting	[ ] Pruritus  		[ ] Other:    Vital Signs Last 24 Hrs  T(C): 37.1 (24 Jan 2019 05:22), Max: 37.1 (24 Jan 2019 05:22)  T(F): 98.7 (24 Jan 2019 05:22), Max: 98.7 (24 Jan 2019 05:22)  HR: 72 (24 Jan 2019 05:22) (66 - 82)  BP: 113/67 (24 Jan 2019 05:22) (107/70 - 118/72)  BP(mean): --  RR: 18 (24 Jan 2019 05:22) (18 - 18)  SpO2: 98% (24 Jan 2019 05:22) (98% - 99%)    ASSESSMENT/ PLAN    Therapy to  be:               [X] Continued   [ ] Discontinued   [ ] Changed to PRN Analgesics    Documentation and Verification of current medications:   [X] Done	[ ] Not done, not eligible    Comments: Transition to po analgesics prn later today

## 2019-01-24 NOTE — PROGRESS NOTE ADULT - SUBJECTIVE AND OBJECTIVE BOX
Patient is a 38y old  Male who presents with a chief complaint of Low back pain (24 Jan 2019 09:45)      SUBJECTIVE / OVERNIGHT EVENTS: + back pain, + HA with slight movement. No cough, SOB, ab pain. + flatus but no BM.    MEDICATIONS  (STANDING):  acetaminophen  IVPB .. 1000 milliGRAM(s) IV Intermittent once  ceFAZolin   IVPB 2000 milliGRAM(s) IV Intermittent once  dexamethasone  Injectable 4 milliGRAM(s) IV Push every 6 hours  dextrose 5%. 1000 milliLiter(s) (50 mL/Hr) IV Continuous <Continuous>  dextrose 50% Injectable 12.5 Gram(s) IV Push once  dextrose 50% Injectable 25 Gram(s) IV Push once  dextrose 50% Injectable 25 Gram(s) IV Push once  docusate sodium 100 milliGRAM(s) Oral three times a day  enoxaparin Injectable 40 milliGRAM(s) SubCutaneous at bedtime  gabapentin 300 milliGRAM(s) Oral at bedtime  HYDROmorphone PCA (1 mG/mL) 30 milliLiter(s) PCA Continuous PCA Continuous  insulin glargine Injectable (LANTUS) 7 Unit(s) SubCutaneous at bedtime  insulin lispro (HumaLOG) corrective regimen sliding scale   SubCutaneous three times a day before meals  insulin lispro (HumaLOG) corrective regimen sliding scale   SubCutaneous at bedtime  insulin lispro Injectable (HumaLOG) 4 Unit(s) SubCutaneous three times a day before meals  lactated ringers. 1000 milliLiter(s) (100 mL/Hr) IV Continuous <Continuous>  polyethylene glycol 3350 17 Gram(s) Oral daily  senna 2 Tablet(s) Oral at bedtime    MEDICATIONS  (PRN):  ascorbic acid 500 milliGRAM(s) Oral two times a day PRN wound healing  dextrose 40% Gel 15 Gram(s) Oral once PRN Blood Glucose LESS THAN 70 milliGRAM(s)/deciliter  glucagon  Injectable 1 milliGRAM(s) IntraMuscular once PRN Glucose LESS THAN 70 milligrams/deciliter  HYDROmorphone PCA (1 mG/mL) Rescue Clinician Bolus 0.5 milliGRAM(s) IV Push every 15 minutes PRN for Pain Scale GREATER THAN 6  magnesium hydroxide Suspension 30 milliLiter(s) Oral every 12 hours PRN Constipation  methocarbamol 750 milliGRAM(s) Oral every 6 hours PRN Back Spasms  naloxone Injectable 0.1 milliGRAM(s) IV Push every 3 minutes PRN For ANY of the following changes in patient status:  A. RR LESS THAN 10 breaths per minute, B. Oxygen saturation LESS THAN 90%, C. Sedation score of 6  ondansetron Injectable 4 milliGRAM(s) IV Push every 6 hours PRN Nausea      Vital Signs Last 24 Hrs  T(C): 36.7 (24 Jan 2019 09:30), Max: 37.1 (24 Jan 2019 05:22)  T(F): 98.1 (24 Jan 2019 09:30), Max: 98.7 (24 Jan 2019 05:22)  HR: 82 (24 Jan 2019 09:30) (66 - 82)  BP: 127/82 (24 Jan 2019 09:30) (107/70 - 127/82)  BP(mean): --  RR: 18 (24 Jan 2019 09:30) (18 - 18)  SpO2: 96% (24 Jan 2019 09:30) (96% - 99%)  CAPILLARY BLOOD GLUCOSE      POCT Blood Glucose.: 199 mg/dL (24 Jan 2019 08:17)  POCT Blood Glucose.: 232 mg/dL (23 Jan 2019 21:53)  POCT Blood Glucose.: 229 mg/dL (23 Jan 2019 18:10)  POCT Blood Glucose.: 212 mg/dL (23 Jan 2019 14:29)  POCT Blood Glucose.: 233 mg/dL (23 Jan 2019 13:24)    I&O's Summary    23 Jan 2019 07:01  -  24 Jan 2019 07:00  --------------------------------------------------------  IN: 1390 mL / OUT: 4780 mL / NET: -3390 mL        PHYSICAL EXAM:  GENERAL: NAD  HEENT: neck supple  LUNG: Clear to auscultation bilaterally; No wheeze  HEART: S1, S2  ABDOMEN: Soft, Nontender, Nondistended, Bowel sounds present  EXTREMITIES: No leg edema  PSYCH: normal affect, calm  NEUROLOGY: AAO x3, moves all extremities  SKIN: warm and dry      LABS:                        13.4   19.46 )-----------( 286      ( 24 Jan 2019 08:14 )             39.0     01-24    143  |  104  |  11  ----------------------------<  258<H>  4.0   |  26  |  0.85    Ca    9.0      24 Jan 2019 07:12                RADIOLOGY & ADDITIONAL TESTS:    Imaging Personally Reviewed:    Consultant(s) Notes Reviewed:      Care Discussed with Consultants/Other Providers: neurosurgery PA

## 2019-01-25 DIAGNOSIS — E11.65 TYPE 2 DIABETES MELLITUS WITH HYPERGLYCEMIA: ICD-10-CM

## 2019-01-25 LAB
ANION GAP SERPL CALC-SCNC: 10 MMOL/L — SIGNIFICANT CHANGE UP (ref 5–17)
BASOPHILS # BLD AUTO: 0.15 K/UL — SIGNIFICANT CHANGE UP (ref 0–0.2)
BASOPHILS NFR BLD AUTO: 0.9 % — SIGNIFICANT CHANGE UP (ref 0–2)
BUN SERPL-MCNC: 10 MG/DL — SIGNIFICANT CHANGE UP (ref 7–23)
CALCIUM SERPL-MCNC: 9.1 MG/DL — SIGNIFICANT CHANGE UP (ref 8.4–10.5)
CHLORIDE SERPL-SCNC: 102 MMOL/L — SIGNIFICANT CHANGE UP (ref 96–108)
CO2 SERPL-SCNC: 28 MMOL/L — SIGNIFICANT CHANGE UP (ref 22–31)
CREAT SERPL-MCNC: 0.87 MG/DL — SIGNIFICANT CHANGE UP (ref 0.5–1.3)
EOSINOPHIL # BLD AUTO: 0.28 K/UL — SIGNIFICANT CHANGE UP (ref 0–0.5)
EOSINOPHIL NFR BLD AUTO: 1.7 % — SIGNIFICANT CHANGE UP (ref 0–6)
GLUCOSE BLDC GLUCOMTR-MCNC: 169 MG/DL — HIGH (ref 70–99)
GLUCOSE BLDC GLUCOMTR-MCNC: 176 MG/DL — HIGH (ref 70–99)
GLUCOSE BLDC GLUCOMTR-MCNC: 217 MG/DL — HIGH (ref 70–99)
GLUCOSE BLDC GLUCOMTR-MCNC: 220 MG/DL — HIGH (ref 70–99)
GLUCOSE SERPL-MCNC: 185 MG/DL — HIGH (ref 70–99)
HCT VFR BLD CALC: 39.8 % — SIGNIFICANT CHANGE UP (ref 39–50)
HCT VFR BLD CALC: 40.2 % — SIGNIFICANT CHANGE UP (ref 39–50)
HGB BLD-MCNC: 13.7 G/DL — SIGNIFICANT CHANGE UP (ref 13–17)
HGB BLD-MCNC: 14.1 G/DL — SIGNIFICANT CHANGE UP (ref 13–17)
LYMPHOCYTES # BLD AUTO: 21.6 % — SIGNIFICANT CHANGE UP (ref 13–44)
LYMPHOCYTES # BLD AUTO: 3.6 K/UL — HIGH (ref 1–3.3)
MCHC RBC-ENTMCNC: 27.1 PG — SIGNIFICANT CHANGE UP (ref 27–34)
MCHC RBC-ENTMCNC: 27.6 PG — SIGNIFICANT CHANGE UP (ref 27–34)
MCHC RBC-ENTMCNC: 34.1 GM/DL — SIGNIFICANT CHANGE UP (ref 32–36)
MCHC RBC-ENTMCNC: 35.4 GM/DL — SIGNIFICANT CHANGE UP (ref 32–36)
MCV RBC AUTO: 78.1 FL — LOW (ref 80–100)
MCV RBC AUTO: 79.6 FL — LOW (ref 80–100)
MONOCYTES # BLD AUTO: 1.3 K/UL — HIGH (ref 0–0.9)
MONOCYTES NFR BLD AUTO: 7.8 % — SIGNIFICANT CHANGE UP (ref 2–14)
NEUTROPHILS # BLD AUTO: 11.33 K/UL — HIGH (ref 1.8–7.4)
NEUTROPHILS NFR BLD AUTO: 68 % — SIGNIFICANT CHANGE UP (ref 43–77)
PLATELET # BLD AUTO: 287 K/UL — SIGNIFICANT CHANGE UP (ref 150–400)
PLATELET # BLD AUTO: 300 K/UL — SIGNIFICANT CHANGE UP (ref 150–400)
POTASSIUM SERPL-MCNC: 4.1 MMOL/L — SIGNIFICANT CHANGE UP (ref 3.5–5.3)
POTASSIUM SERPL-SCNC: 4.1 MMOL/L — SIGNIFICANT CHANGE UP (ref 3.5–5.3)
RBC # BLD: 5.05 M/UL — SIGNIFICANT CHANGE UP (ref 4.2–5.8)
RBC # BLD: 5.09 M/UL — SIGNIFICANT CHANGE UP (ref 4.2–5.8)
RBC # FLD: 13.3 % — SIGNIFICANT CHANGE UP (ref 10.3–14.5)
RBC # FLD: 13.9 % — SIGNIFICANT CHANGE UP (ref 10.3–14.5)
SODIUM SERPL-SCNC: 140 MMOL/L — SIGNIFICANT CHANGE UP (ref 135–145)
SURGICAL PATHOLOGY STUDY: SIGNIFICANT CHANGE UP
WBC # BLD: 16.66 K/UL — HIGH (ref 3.8–10.5)
WBC # BLD: 17.2 K/UL — HIGH (ref 3.8–10.5)
WBC # FLD AUTO: 16.66 K/UL — HIGH (ref 3.8–10.5)
WBC # FLD AUTO: 17.2 K/UL — HIGH (ref 3.8–10.5)

## 2019-01-25 PROCEDURE — 93970 EXTREMITY STUDY: CPT | Mod: 26

## 2019-01-25 PROCEDURE — 72131 CT LUMBAR SPINE W/O DYE: CPT | Mod: 26

## 2019-01-25 PROCEDURE — 99254 IP/OBS CNSLTJ NEW/EST MOD 60: CPT | Mod: GC

## 2019-01-25 PROCEDURE — 71045 X-RAY EXAM CHEST 1 VIEW: CPT | Mod: 26

## 2019-01-25 RX ORDER — OXYCODONE HYDROCHLORIDE 5 MG/1
10 TABLET ORAL EVERY 4 HOURS
Qty: 0 | Refills: 0 | Status: DISCONTINUED | OUTPATIENT
Start: 2019-01-25 | End: 2019-01-29

## 2019-01-25 RX ORDER — INSULIN LISPRO 100/ML
VIAL (ML) SUBCUTANEOUS
Qty: 0 | Refills: 0 | Status: DISCONTINUED | OUTPATIENT
Start: 2019-01-25 | End: 2019-01-29

## 2019-01-25 RX ORDER — INSULIN GLARGINE 100 [IU]/ML
12 INJECTION, SOLUTION SUBCUTANEOUS AT BEDTIME
Qty: 0 | Refills: 0 | Status: DISCONTINUED | OUTPATIENT
Start: 2019-01-25 | End: 2019-01-25

## 2019-01-25 RX ORDER — INSULIN GLARGINE 100 [IU]/ML
10 INJECTION, SOLUTION SUBCUTANEOUS AT BEDTIME
Qty: 0 | Refills: 0 | Status: DISCONTINUED | OUTPATIENT
Start: 2019-01-25 | End: 2019-01-29

## 2019-01-25 RX ORDER — ACETAMINOPHEN 500 MG
1000 TABLET ORAL ONCE
Qty: 0 | Refills: 0 | Status: COMPLETED | OUTPATIENT
Start: 2019-01-25 | End: 2019-01-25

## 2019-01-25 RX ORDER — GABAPENTIN 400 MG/1
300 CAPSULE ORAL EVERY 8 HOURS
Qty: 0 | Refills: 0 | Status: DISCONTINUED | OUTPATIENT
Start: 2019-01-25 | End: 2019-01-29

## 2019-01-25 RX ORDER — OXYCODONE HYDROCHLORIDE 5 MG/1
5 TABLET ORAL
Qty: 0 | Refills: 0 | Status: DISCONTINUED | OUTPATIENT
Start: 2019-01-25 | End: 2019-01-29

## 2019-01-25 RX ORDER — ACETAMINOPHEN 500 MG
650 TABLET ORAL EVERY 6 HOURS
Qty: 0 | Refills: 0 | Status: DISCONTINUED | OUTPATIENT
Start: 2019-01-25 | End: 2019-01-29

## 2019-01-25 RX ORDER — SODIUM CHLORIDE 9 MG/ML
750 INJECTION INTRAMUSCULAR; INTRAVENOUS; SUBCUTANEOUS ONCE
Qty: 0 | Refills: 0 | Status: COMPLETED | OUTPATIENT
Start: 2019-01-25 | End: 2019-01-25

## 2019-01-25 RX ADMIN — METHOCARBAMOL 750 MILLIGRAM(S): 500 TABLET, FILM COATED ORAL at 22:02

## 2019-01-25 RX ADMIN — HYDROMORPHONE HYDROCHLORIDE 30 MILLILITER(S): 2 INJECTION INTRAMUSCULAR; INTRAVENOUS; SUBCUTANEOUS at 02:49

## 2019-01-25 RX ADMIN — Medication 100 MILLIGRAM(S): at 14:46

## 2019-01-25 RX ADMIN — Medication 2: at 18:29

## 2019-01-25 RX ADMIN — ENOXAPARIN SODIUM 40 MILLIGRAM(S): 100 INJECTION SUBCUTANEOUS at 21:18

## 2019-01-25 RX ADMIN — SODIUM CHLORIDE 1500 MILLILITER(S): 9 INJECTION INTRAMUSCULAR; INTRAVENOUS; SUBCUTANEOUS at 22:20

## 2019-01-25 RX ADMIN — Medication 4 UNIT(S): at 09:24

## 2019-01-25 RX ADMIN — OXYCODONE HYDROCHLORIDE 10 MILLIGRAM(S): 5 TABLET ORAL at 15:05

## 2019-01-25 RX ADMIN — Medication 2: at 09:24

## 2019-01-25 RX ADMIN — Medication 100 MILLIGRAM(S): at 21:19

## 2019-01-25 RX ADMIN — SENNA PLUS 2 TABLET(S): 8.6 TABLET ORAL at 21:18

## 2019-01-25 RX ADMIN — Medication 400 MILLIGRAM(S): at 13:45

## 2019-01-25 RX ADMIN — Medication 4 UNIT(S): at 13:51

## 2019-01-25 RX ADMIN — Medication 1000 MILLIGRAM(S): at 14:48

## 2019-01-25 RX ADMIN — GABAPENTIN 300 MILLIGRAM(S): 400 CAPSULE ORAL at 21:18

## 2019-01-25 RX ADMIN — METHOCARBAMOL 750 MILLIGRAM(S): 500 TABLET, FILM COATED ORAL at 16:10

## 2019-01-25 RX ADMIN — Medication 650 MILLIGRAM(S): at 23:00

## 2019-01-25 RX ADMIN — OXYCODONE HYDROCHLORIDE 10 MILLIGRAM(S): 5 TABLET ORAL at 11:00

## 2019-01-25 RX ADMIN — Medication 100 MILLIGRAM(S): at 05:24

## 2019-01-25 RX ADMIN — HYDROMORPHONE HYDROCHLORIDE 30 MILLILITER(S): 2 INJECTION INTRAMUSCULAR; INTRAVENOUS; SUBCUTANEOUS at 07:29

## 2019-01-25 RX ADMIN — OXYCODONE HYDROCHLORIDE 10 MILLIGRAM(S): 5 TABLET ORAL at 14:46

## 2019-01-25 RX ADMIN — GABAPENTIN 300 MILLIGRAM(S): 400 CAPSULE ORAL at 14:46

## 2019-01-25 RX ADMIN — OXYCODONE HYDROCHLORIDE 10 MILLIGRAM(S): 5 TABLET ORAL at 10:29

## 2019-01-25 RX ADMIN — Medication 4 UNIT(S): at 18:30

## 2019-01-25 RX ADMIN — INSULIN GLARGINE 10 UNIT(S): 100 INJECTION, SOLUTION SUBCUTANEOUS at 22:03

## 2019-01-25 RX ADMIN — OXYCODONE HYDROCHLORIDE 10 MILLIGRAM(S): 5 TABLET ORAL at 19:02

## 2019-01-25 NOTE — PHYSICAL THERAPY INITIAL EVALUATION ADULT - ADDITIONAL COMMENTS
as per pt: PTA pt was living in a  + Stairs (lives on second floor + staircase to second floor + handrail) and was independent in all functional mobility and ADL's. no AD for gait. lives with spouse.

## 2019-01-25 NOTE — PHYSICAL THERAPY INITIAL EVALUATION ADULT - PERTINENT HX OF CURRENT PROBLEM, REHAB EVAL
37 y/o male with PMHx of sickle cell trait, possible TIA (2012), RBC microcytosis, chronic lower back pain c/o intermittent worsening lower back pain radiating down both legs with associated tingling in both feet. Patient reports receiving steroid injections, most recent in November with little relief. imaging revealed L5-S1 disc extrusion. Evaluated by Dr. Arce. Presents to PST for a scheduled L5-S1 bilateral laminotomies, possible laminectomy and excision of herniated disc on 1/15/2019.

## 2019-01-25 NOTE — PROGRESS NOTE ADULT - ASSESSMENT
HPI:  39 y/o male with PMHx of sickle cell trait, possible TIA (2012), RBC microcytosis, chronic lower back pain c/o intermittent worsening lower back pain radiating down both legs with associated tingling in both feet. Patient reports receiving steroid injections, most recent in November with little relief. Diagnostic imaging revealed L5-S1 disc extrusion. Evaluated by Dr. Arce. Presents to PST for a scheduled L5-S1 bilateral laminotomies, possible laminectomy and excision of herniated disc on 1/15/2019. (08 Jan 2019 18:44)    PROCEDURE: 1/22 S/P B/L L5-S1 TLIF w/ pedicle screw fixation; intraoperative CSF leak with primary repair     POD# 3 	      PLAN:  NEURO: Will increase neurontin 300 TID, CT L/S ordered, D/C PCA, OXY prn, methocarbamol prn for spasms; PT-p, can be OOB today  PULM: room air, incentive spirometry  ENDO: Fingersticks improved on current lantus/ humalog regimen.  Endocrine consult called.   HEME/ONC:  H/H stable             DVT ppx: [X] SQL [] SQH [X] Venodynes bilaterally   RENAL: IVL, D/C ryan, ck void  ID: afebrile  GI: CCD diet, bowel regimen  DISCHARGE PLANNING: PT-p      Assessment:  Please Check When Present   []  GCS  E   V  M     Heart Failure: []Acute, [] acute on chronic , []chronic  Heart Failure:  [] Diastolic (HFpEF), [] Systolic (HFrEF), []Combined (HFpEF and HFrEF), [] RHF, [] Pulm HTN, [] Other    [] ANA MARIA, [] ATN, [] AIN, [] other  [] CKD1, [] CKD2, [] CKD 3, [] CKD 4, [] CKD 5, []ESRD    Encephalopathy: [] Metabolic, [] Hepatic, [] toxic, [] Neurological, [] Other    Abnormal Nurtitional Status: [] malnurtition (see nutrition note), [ ]underweight: BMI < 19, [] morbid obesity: BMI >40, [] Cachexia    [] Sepsis  [] hypovolemic shock,[] cardiogenic shock, [] hemorrhagic shock, [] neuogenic shock  [] Acute Respiratory Failure  []Cerebral edema, [] Brain compression/ herniation,   [] Functional quadriplegia  [] Acute blood loss anemia    Will discuss plan with Dr. Claude Marquez # [id number]

## 2019-01-25 NOTE — CONSULT NOTE ADULT - ASSESSMENT
38 year old male with PMHx of DMII and chronic back pain s/p bilateral L5-S1 TLIF with pedical screw fixation with intraop CSF leak with primary repair currently in need of diabetes management. 38 year old male with PMHx of DMII and chronic back pain s/p bilateral L5-S1 TLIF with pedical screw fixation with intraop CSF leak with primary repair currently in need of diabetes management.     Endocrinology consulted for management of Diabetes type II. Patient reports no hx of diabetes but was told a year ago that he was prediabetic and was started on Metformin 500mg once a day. Patient reports history of TIA was in 2012 where he experienced some numbness and tingling of the right side of his face and arm but it went away and work up was negative. Patient reports blood sugars have been high due to the steroids he has been getting. Patient was getting regular steroid injections for his chronic back pain, last one in November. Inpatient, patient was placed on Decadron postoperatively after L5-S1 TLIF procedure, last dose was 2 days ago. Blood sugars were noted to be elevated. Patient reports not being compliant on a diabetic diet at home, does eat white bread and rice regularly at home. But patient does report that he stopped drinking soda.

## 2019-01-25 NOTE — PHYSICAL THERAPY INITIAL EVALUATION ADULT - PLANNED THERAPY INTERVENTIONS, PT EVAL
balance training/bed mobility training/Pt will negotiate 1 flight of stairs independently in 2 weeks./transfer training/gait training/strengthening

## 2019-01-25 NOTE — PHYSICAL THERAPY INITIAL EVALUATION ADULT - IMPAIRMENTS FOUND, PT EVAL
aerobic capacity/endurance/muscle strength/gait, locomotion, and balance/joint integrity and mobility

## 2019-01-25 NOTE — CONSULT NOTE ADULT - PROBLEM SELECTOR RECOMMENDATION 9
Patient with HbA1C of 8.4  Home regimen: Metformin 500mg once daily  possible hx of TIA- would check lipid panel     Current Regimen:  7U Lantus  Premeal 4U Humalog  Medium correction scale    Recommendation:  Increase Lantus to 12U   Keep Pre-meal 4 U Humalog  low correction scale Patient with HbA1C of 8.4  Home regimen: Metformin 500mg once daily  possible hx of TIA- would check lipid panel     Current Regimen:  7U Lantus  Premeal 4U Humalog  Medium correction scale    Recommendation:  Increase Lantus to 12U   Keep Pre-meal 4 U Humalog  low correction scale  With discharge planning, patient should be started on Metformin 500mg BID  Diet and Lifestyle modification   Endocrinology outpatient follow up Patient with HbA1C of 8.4  Home regimen: Metformin 500mg once daily  Patient with steroid induce hyperglycemia, was on 4mg Decadron q6, last given 1/23/19 at 11am  Glucose in high 100s today     Current Regimen:  7U Lantus  Premeal 4U Humalog  Medium correction scale    Recommendation:  Increase Lantus to 12U   Keep Pre-meal 4 U Humalog  low correction scale  Will adjust accordingly  With discharge planning, patient should be started on Metformin 500mg BID  Diet and Lifestyle modification   Endocrinology outpatient follow up Patient with HbA1C of 8.4  Home regimen: Metformin 500mg once daily  Patient with steroid induce hyperglycemia, was on 4mg Decadron q6, last given 1/23/19 at 11am  Glucose in high 100s today     Current Regimen:  17U Lantus  Premeal 4U Humalog  Medium correction scale    Recommendation:  Increase Lantus to 10U   Keep Pre-meal 4 U Humalog  low correction scale  Will adjust accordingly  With discharge planning, patient should be started on Metformin 500mg BID can titrate up to 1000mg BID if needed.   Diet and Lifestyle modification   Endocrinology outpatient follow up

## 2019-01-25 NOTE — PHYSICAL THERAPY INITIAL EVALUATION ADULT - CRITERIA FOR SKILLED THERAPEUTIC INTERVENTIONS
rehab potential/anticipated equipment needs at discharge/functional limitations in following categories/predicted duration of therapy intervention/impairments found/risk reduction/prevention/therapy frequency/anticipated discharge recommendation

## 2019-01-25 NOTE — PROGRESS NOTE ADULT - SUBJECTIVE AND OBJECTIVE BOX
SUBJECTIVE: Patient seen, laying in bed.  C/O incisional pain and pain in Left thigh.  Denies numbness.      CHIEF COMPLAINT: back pain    OVERNIGHT EVENTS: pain with turning    Vital Signs Last 24 Hrs  T(C): 37.3 (25 Jan 2019 07:20), Max: 37.3 (25 Jan 2019 00:37)  T(F): 99.2 (25 Jan 2019 07:20), Max: 99.2 (25 Jan 2019 00:37)  HR: 85 (25 Jan 2019 07:20) (75 - 95)  BP: 112/75 (25 Jan 2019 07:20) (112/75 - 116/78)  BP(mean): --  RR: 18 (25 Jan 2019 07:20) (18 - 18)  SpO2: 97% (25 Jan 2019 07:20) (96% - 98%)    DRAINS: none    PHYSICAL EXAM:    Constitutional: No Acute Distress     Neurological: AOx3, Following Commands, Moving all Extremities     Motor exam:          Upper extremity                         Delt     Bicep     Tricep    HG                                                 R         5/5        5/5        5/5       5/5                                               L          5/5        5/5        5/5       5/5          Lower extremity                        HF         KF        KE       DF         PF                                                  R        4/5        5/5        5/5       5/5         5/5                                               L         4/5        5/5       5/5       5/5          5/5                                                 Sensation: [X] intact to light touch  [] decreased:     Pulmonary: Clear to Auscultation, No rales, No rhonchi, No wheezes     Cardiovascular: S1, S2, Regular rate and rhythm     Gastrointestinal: Soft, Non-tender, Non-distended     Extremities: No calf tenderness     Incision: dressing in place, clean and dry    LABS:                        13.7   16.66 )-----------( 300      ( 25 Jan 2019 08:44 )             40.2    01-25    140  |  102  |  10  ----------------------------<  185<H>  4.1   |  28  |  0.87    Ca    9.1      25 Jan 2019 07:11    POCT Blood Glucose.: 169 mg/dL (01.25.19 @ 09:10)    POCT Blood Glucose.: 150 mg/dL (01.24.19 @ 21:54)      MEDICATIONS:  Antibiotics:    Neuro:  gabapentin 300 milliGRAM(s) Oral every 8 hours  methocarbamol 750 milliGRAM(s) Oral every 6 hours PRN Back Spasms  oxyCODONE    IR 5 milliGRAM(s) Oral every 3 hours PRN Moderate Pain (4 - 6)  oxyCODONE    IR 10 milliGRAM(s) Oral every 4 hours PRN Severe Pain (7 - 10)    Cardiac:    Pulm:    GI/:  docusate sodium 100 milliGRAM(s) Oral three times a day  magnesium hydroxide Suspension 30 milliLiter(s) Oral every 12 hours PRN Constipation  polyethylene glycol 3350 17 Gram(s) Oral daily  senna 2 Tablet(s) Oral at bedtime    Other:   ascorbic acid 500 milliGRAM(s) Oral two times a day PRN wound healing  dextrose 40% Gel 15 Gram(s) Oral once PRN Blood Glucose LESS THAN 70 milliGRAM(s)/deciliter  dextrose 5%. 1000 milliLiter(s) IV Continuous <Continuous>  dextrose 50% Injectable 12.5 Gram(s) IV Push once  dextrose 50% Injectable 25 Gram(s) IV Push once  dextrose 50% Injectable 25 Gram(s) IV Push once  enoxaparin Injectable 40 milliGRAM(s) SubCutaneous at bedtime  glucagon  Injectable 1 milliGRAM(s) IntraMuscular once PRN Glucose LESS THAN 70 milligrams/deciliter  insulin glargine Injectable (LANTUS) 7 Unit(s) SubCutaneous at bedtime  insulin lispro (HumaLOG) corrective regimen sliding scale   SubCutaneous three times a day before meals  insulin lispro (HumaLOG) corrective regimen sliding scale   SubCutaneous at bedtime  insulin lispro Injectable (HumaLOG) 4 Unit(s) SubCutaneous three times a day before meals    DIET: [] Regular [X] CCD [] Renal [] Puree [] Dysphagia [] Tube Feeds:     IMAGING: none

## 2019-01-25 NOTE — CONSULT NOTE ADULT - SUBJECTIVE AND OBJECTIVE BOX
HPI:  38 year old male with PMHx of DMII, sick cell trait, possible TIA(2012) adn chronic lower back pain admitted on 01/08/19 for repair of disc extrusion of L5-S1. Patient is s/p bilateral L5-S1 TLIF with pedical screw fixation with intraop CSF leak with primary repair.     Endocrinology called for diabetic management.     PAST MEDICAL & SURGICAL HISTORY:  TIA (transient ischemic attack): possible, ~2012  RBC microcytosis  Sickle cell trait  Disc displacement, lumbar  T2DM (type 2 diabetes mellitus)  H/O pilonidal cyst  H/O colonoscopy  Cyst of skin: back, excision      FAMILY HISTORY:      Social History:    Outpatient Medications:    MEDICATIONS  (STANDING):  dextrose 5%. 1000 milliLiter(s) (50 mL/Hr) IV Continuous <Continuous>  dextrose 50% Injectable 12.5 Gram(s) IV Push once  dextrose 50% Injectable 25 Gram(s) IV Push once  dextrose 50% Injectable 25 Gram(s) IV Push once  docusate sodium 100 milliGRAM(s) Oral three times a day  enoxaparin Injectable 40 milliGRAM(s) SubCutaneous at bedtime  gabapentin 300 milliGRAM(s) Oral every 8 hours  insulin glargine Injectable (LANTUS) 7 Unit(s) SubCutaneous at bedtime  insulin lispro (HumaLOG) corrective regimen sliding scale   SubCutaneous three times a day before meals  insulin lispro (HumaLOG) corrective regimen sliding scale   SubCutaneous at bedtime  insulin lispro Injectable (HumaLOG) 4 Unit(s) SubCutaneous three times a day before meals  polyethylene glycol 3350 17 Gram(s) Oral daily  senna 2 Tablet(s) Oral at bedtime    MEDICATIONS  (PRN):  ascorbic acid 500 milliGRAM(s) Oral two times a day PRN wound healing  dextrose 40% Gel 15 Gram(s) Oral once PRN Blood Glucose LESS THAN 70 milliGRAM(s)/deciliter  glucagon  Injectable 1 milliGRAM(s) IntraMuscular once PRN Glucose LESS THAN 70 milligrams/deciliter  magnesium hydroxide Suspension 30 milliLiter(s) Oral every 12 hours PRN Constipation  methocarbamol 750 milliGRAM(s) Oral every 6 hours PRN Back Spasms  oxyCODONE    IR 5 milliGRAM(s) Oral every 3 hours PRN Moderate Pain (4 - 6)  oxyCODONE    IR 10 milliGRAM(s) Oral every 4 hours PRN Severe Pain (7 - 10)      Allergies    No Known Allergies    Intolerances      Review of Systems:  Constitutional: No fever  Eyes: No blurry vision  Neuro: No tremors  HEENT: No pain  Cardiovascular: No chest pain, palpitations  Respiratory: No SOB, no cough  GI: No nausea, vomiting, abdominal pain  : No dysuria  Skin: no rash  Psych: no depression  Endocrine: no polyuria, polydipsia  Hem/lymph: no swelling  Osteoporosis: no fractures    ALL OTHER SYSTEMS REVIEWED AND NEGATIVE    UNABLE TO OBTAIN    PHYSICAL EXAM:  VITALS: T(C): 37.3 (01-25-19 @ 07:20)  T(F): 99.2 (01-25-19 @ 07:20), Max: 99.2 (01-25-19 @ 00:37)  HR: 85 (01-25-19 @ 07:20) (75 - 95)  BP: 112/75 (01-25-19 @ 07:20) (112/75 - 116/78)  RR:  (18 - 18)  SpO2:  (96% - 98%)  Wt(kg): --  GENERAL: NAD, well-groomed, well-developed  EYES: No proptosis, no lid lag, anicteric  HEENT:  Atraumatic, Normocephalic, moist mucous membranes  THYROID: Normal size, no palpable nodules  RESPIRATORY: Clear to auscultation bilaterally; No rales, rhonchi, wheezing  CARDIOVASCULAR: Regular rate and rhythm; No murmurs; no peripheral edema  GI: Soft, nontender, non distended, normal bowel sounds  SKIN: Dry, intact, No rashes or lesions  MUSCULOSKELETAL: Full range of motion, normal strength  NEURO: sensation intact, extraocular movements intact, no tremor  PSYCH: Alert and oriented x 3, normal affect, normal mood  CUSHING'S SIGNS: no striae      CAPILLARY BLOOD GLUCOSE      POCT Blood Glucose.: 169 mg/dL (25 Jan 2019 09:10)  POCT Blood Glucose.: 150 mg/dL (24 Jan 2019 21:54)  POCT Blood Glucose.: 195 mg/dL (24 Jan 2019 18:01)  POCT Blood Glucose.: 154 mg/dL (24 Jan 2019 13:19)                            13.7   16.66 )-----------( 300      ( 25 Jan 2019 08:44 )             40.2       01-25    140  |  102  |  10  ----------------------------<  185<H>  4.1   |  28  |  0.87    EGFR if : 129  EGFR if non : 111    Ca    9.1      01-25        Thyroid Function Tests:      Hemoglobin A1C, Whole Blood: 8.4 % <H> [4.0 - 5.6] (01-08-19 @ 21:16)          Radiology: HPI:  38 year old male with PMHx of DMII, sick cell trait, possible TIA(2012) adn chronic lower back pain admitted on 01/08/19 for repair of disc extrusion of L5-S1. Patient is s/p bilateral L5-S1 TLIF with pedical screw fixation with intraop CSF leak with primary repair.     Endocrinology consulted for management of Diabetes type II. Patient reports no hx of diabetes but was told a year ago that he was prediabetic and was started on Metformin 500mg once a day. Patient reports history of TIA was in 2012 where he experienced some numbness and tingling of the right side of his face and arm but it went away and work up was negative. Patient reports blood sugars have been high due to the steroids he has been getting. Patient was getting regular steroid injections for his chronic back pain, last one in November. Inpatient, patient was placed on Decadron postoperatively after L5-S1 TLIF procedure, last dose was 2 days ago. Blood sugars were noted to be elevated. Patient reports not being compliant on a diabetic diet at home, does eat white bread and rice regularly at home. But patient does report that he stopped drinking soda.         PAST MEDICAL & SURGICAL HISTORY:  TIA (transient ischemic attack): possible, ~2012  RBC microcytosis  Sickle cell trait  Disc displacement, lumbar  T2DM (type 2 diabetes mellitus)  H/O pilonidal cyst  H/O colonoscopy  Cyst of skin: back, excision      FAMILY HISTORY:  Diabetes type II (Father, grandmother)  HTN( Father)  MI (Father)      Social History:  Quit smoking 1 year ago- 20 year 1PPD hx  denies ETOH abuse  denies illicit drug use    Outpatient Medications:  Metformin 500mg daily    MEDICATIONS  (STANDING):  dextrose 5%. 1000 milliLiter(s) (50 mL/Hr) IV Continuous <Continuous>  dextrose 50% Injectable 12.5 Gram(s) IV Push once  dextrose 50% Injectable 25 Gram(s) IV Push once  dextrose 50% Injectable 25 Gram(s) IV Push once  docusate sodium 100 milliGRAM(s) Oral three times a day  enoxaparin Injectable 40 milliGRAM(s) SubCutaneous at bedtime  gabapentin 300 milliGRAM(s) Oral every 8 hours  insulin glargine Injectable (LANTUS) 7 Unit(s) SubCutaneous at bedtime  insulin lispro (HumaLOG) corrective regimen sliding scale   SubCutaneous three times a day before meals  insulin lispro (HumaLOG) corrective regimen sliding scale   SubCutaneous at bedtime  insulin lispro Injectable (HumaLOG) 4 Unit(s) SubCutaneous three times a day before meals  polyethylene glycol 3350 17 Gram(s) Oral daily  senna 2 Tablet(s) Oral at bedtime    MEDICATIONS  (PRN):  ascorbic acid 500 milliGRAM(s) Oral two times a day PRN wound healing  dextrose 40% Gel 15 Gram(s) Oral once PRN Blood Glucose LESS THAN 70 milliGRAM(s)/deciliter  glucagon  Injectable 1 milliGRAM(s) IntraMuscular once PRN Glucose LESS THAN 70 milligrams/deciliter  magnesium hydroxide Suspension 30 milliLiter(s) Oral every 12 hours PRN Constipation  methocarbamol 750 milliGRAM(s) Oral every 6 hours PRN Back Spasms  oxyCODONE    IR 5 milliGRAM(s) Oral every 3 hours PRN Moderate Pain (4 - 6)  oxyCODONE    IR 10 milliGRAM(s) Oral every 4 hours PRN Severe Pain (7 - 10)      Allergies    No Known Allergies    Intolerances      Review of Systems:  Constitutional: No fever  Eyes: No blurry vision  Neuro: No tremors  HEENT: No pain  Cardiovascular: No chest pain, palpitations  Respiratory: No SOB, no cough  GI: No nausea, vomiting, abdominal pain  : No polyuria, no dysuria, +catheter  Skin: no rash  Psych: no depression  Endocrine: no polyuria, no polydipsia  Hem/lymph: no swelling  Osteoporosis: +back pain, s/p lumbar-sacral fusion    ALL OTHER SYSTEMS REVIEWED AND NEGATIVE    UNABLE TO OBTAIN    PHYSICAL EXAM:  VITALS: T(C): 37.3 (01-25-19 @ 07:20)  T(F): 99.2 (01-25-19 @ 07:20), Max: 99.2 (01-25-19 @ 00:37)  HR: 85 (01-25-19 @ 07:20) (75 - 95)  BP: 112/75 (01-25-19 @ 07:20) (112/75 - 116/78)  RR:  (18 - 18)  SpO2:  (96% - 98%)  Wt(kg): --  GENERAL: NAD, well-groomed, well-developed  EYES: No proptosis, no lid lag, anicteric  HEENT:  Atraumatic, Normocephalic, moist mucous membranes  THYROID: Normal size, no palpable nodules  RESPIRATORY: Clear to auscultation bilaterally; No rales, rhonchi, wheezing  CARDIOVASCULAR: Regular rate and rhythm; No murmurs; no peripheral edema  GI: Soft, nontender, non distended, normal bowel sounds  SKIN: Dry, intact, No rashes or lesions  MUSCULOSKELETAL: Back ROM limited due to pain, normal strength,   NEURO: sensation intact, extraocular movements intact, no tremor  PSYCH: Alert and oriented x 3, normal affect, normal mood  CUSHING'S SIGNS: no striae      CAPILLARY BLOOD GLUCOSE      POCT Blood Glucose.: 169 mg/dL (25 Jan 2019 09:10)  POCT Blood Glucose.: 150 mg/dL (24 Jan 2019 21:54)  POCT Blood Glucose.: 195 mg/dL (24 Jan 2019 18:01)  POCT Blood Glucose.: 154 mg/dL (24 Jan 2019 13:19)                            13.7   16.66 )-----------( 300      ( 25 Jan 2019 08:44 )             40.2       01-25    140  |  102  |  10  ----------------------------<  185<H>  4.1   |  28  |  0.87    EGFR if : 129  EGFR if non : 111    Ca    9.1      01-25        Thyroid Function Tests:      Hemoglobin A1C, Whole Blood: 8.4 % <H> [4.0 - 5.6] (01-08-19 @ 21:16)          Radiology: HPI:  38 year old male with PMHx of DMII, sick cell trait, possible TIA(2012) adn chronic lower back pain admitted on 01/08/19 for repair of disc extrusion of L5-S1. Patient is s/p bilateral L5-S1 TLIF with pedical screw fixation with intraop CSF leak with primary repair.     Endocrinology consulted for management of Diabetes type II. Patient reports no hx of diabetes but was told a year ago that he was prediabetic and was started on Metformin 500mg once a day. Patient reports history of TIA was in 2012 where he experienced some numbness and tingling of the right side of his face and arm but it went away and work up was negative. Patient reports blood sugars have been high due to the steroids he has been getting. Patient was getting regular steroid injections for his chronic back pain, last one in November. Inpatient, patient was placed on Decadron postoperatively after L5-S1 TLIF procedure, last dose was 2 days ago. Blood sugars were noted to be elevated. Patient reports not being compliant on a diabetic diet at home, does eat white bread and rice regularly at home. But patient does report that he stopped drinking soda.         PAST MEDICAL & SURGICAL HISTORY:  TIA (transient ischemic attack): possible, ~2012  RBC microcytosis  Sickle cell trait  Disc displacement, lumbar  T2DM (type 2 diabetes mellitus)  H/O pilonidal cyst  H/O colonoscopy  Cyst of skin: back, excision      FAMILY HISTORY:  Diabetes type II (Father, grandmother)  HTN( Father)  MI (Father)      Social History:  Quit smoking 1 year ago- 20 year 1PPD hx  denies ETOH abuse  denies illicit drug use    Outpatient Medications:  Metformin 500mg daily    MEDICATIONS  (STANDING):  dextrose 5%. 1000 milliLiter(s) (50 mL/Hr) IV Continuous <Continuous>  dextrose 50% Injectable 12.5 Gram(s) IV Push once  dextrose 50% Injectable 25 Gram(s) IV Push once  dextrose 50% Injectable 25 Gram(s) IV Push once  docusate sodium 100 milliGRAM(s) Oral three times a day  enoxaparin Injectable 40 milliGRAM(s) SubCutaneous at bedtime  gabapentin 300 milliGRAM(s) Oral every 8 hours  insulin glargine Injectable (LANTUS) 7 Unit(s) SubCutaneous at bedtime  insulin lispro (HumaLOG) corrective regimen sliding scale   SubCutaneous three times a day before meals  insulin lispro (HumaLOG) corrective regimen sliding scale   SubCutaneous at bedtime  insulin lispro Injectable (HumaLOG) 4 Unit(s) SubCutaneous three times a day before meals  polyethylene glycol 3350 17 Gram(s) Oral daily  senna 2 Tablet(s) Oral at bedtime    MEDICATIONS  (PRN):  ascorbic acid 500 milliGRAM(s) Oral two times a day PRN wound healing  dextrose 40% Gel 15 Gram(s) Oral once PRN Blood Glucose LESS THAN 70 milliGRAM(s)/deciliter  glucagon  Injectable 1 milliGRAM(s) IntraMuscular once PRN Glucose LESS THAN 70 milligrams/deciliter  magnesium hydroxide Suspension 30 milliLiter(s) Oral every 12 hours PRN Constipation  methocarbamol 750 milliGRAM(s) Oral every 6 hours PRN Back Spasms  oxyCODONE    IR 5 milliGRAM(s) Oral every 3 hours PRN Moderate Pain (4 - 6)  oxyCODONE    IR 10 milliGRAM(s) Oral every 4 hours PRN Severe Pain (7 - 10)      Allergies    No Known Allergies    Intolerances      Review of Systems:  Constitutional: No fever  Eyes: No blurry vision  Neuro: No tremors  HEENT: No pain  Cardiovascular: No chest pain, palpitations  Respiratory: No SOB, no cough  GI: No nausea, vomiting, abdominal pain  : No polyuria, no dysuria, +catheter  Skin: no rash  Psych: no depression  Endocrine: no polyuria, no polydipsia  Hem/lymph: no swelling  Osteoporosis: +back pain, s/p lumbar-sacral fusion    ALL OTHER SYSTEMS REVIEWED AND NEGATIVE    UNABLE TO OBTAIN    PHYSICAL EXAM:  VITALS: T(C): 37.3 (01-25-19 @ 07:20)  T(F): 99.2 (01-25-19 @ 07:20), Max: 99.2 (01-25-19 @ 00:37)  HR: 85 (01-25-19 @ 07:20) (75 - 95)  BP: 112/75 (01-25-19 @ 07:20) (112/75 - 116/78)  RR:  (18 - 18)  SpO2:  (96% - 98%)  Wt(kg): --  GENERAL: NAD, well-groomed, well-developed  EYES: No proptosis, no lid lag, anicteric  HEENT:  Atraumatic, Normocephalic, moist mucous membranes  THYROID: Normal size, no palpable nodules  RESPIRATORY: Clear to auscultation bilaterally; No rales, rhonchi, wheezing  CARDIOVASCULAR: Regular rate and rhythm; No murmurs; no peripheral edema  GI: Soft, nontender, non distended, normal bowel sounds  SKIN: Dry, intact, No rashes or lesions +acanthosis  MUSCULOSKELETAL: Back ROM limited due to pain, normal strength,   NEURO: sensation intact, extraocular movements intact, no tremor  PSYCH: Alert and oriented x 3, normal affect, normal mood  CUSHING'S SIGNS: no striae      CAPILLARY BLOOD GLUCOSE      POCT Blood Glucose.: 169 mg/dL (25 Jan 2019 09:10)  POCT Blood Glucose.: 150 mg/dL (24 Jan 2019 21:54)  POCT Blood Glucose.: 195 mg/dL (24 Jan 2019 18:01)  POCT Blood Glucose.: 154 mg/dL (24 Jan 2019 13:19)                            13.7   16.66 )-----------( 300      ( 25 Jan 2019 08:44 )             40.2       01-25    140  |  102  |  10  ----------------------------<  185<H>  4.1   |  28  |  0.87    EGFR if : 129  EGFR if non : 111    Ca    9.1      01-25        Thyroid Function Tests:      Hemoglobin A1C, Whole Blood: 8.4 % <H> [4.0 - 5.6] (01-08-19 @ 21:16)          Radiology:

## 2019-01-25 NOTE — PROGRESS NOTE ADULT - ATTENDING COMMENTS
Pt seen and examined earlier.  No headaches at 45 degrees, HV was d/c'd yesterday.  MATSON well.  Dressing dry.  Afebrile.  Pt complain of left anterolateral thigh burning.  Touch sensation is preserved.   OOB as much as possible, d/c ryan.  Gabapentin for likely left meralgia paresthetica related to positioning.  CT LS spine prior to discharge. Pt seen and examined earlier.  No headaches at 45 degrees, HV was d/c'd yesterday.  MATSON well.  Dressing dry.  Afebrile.  Pt complain of left anterolateral thigh burning.  Touch sensation is preserved.  Calves supple. OOB as much as possible, d/c ryan.  Gabapentin for likely left meralgia paresthetica related to positioning.  CT LS spine prior to discharge.

## 2019-01-26 LAB
APPEARANCE UR: CLEAR — SIGNIFICANT CHANGE UP
BILIRUB UR-MCNC: NEGATIVE — SIGNIFICANT CHANGE UP
COLOR SPEC: SIGNIFICANT CHANGE UP
DIFF PNL FLD: NEGATIVE — SIGNIFICANT CHANGE UP
GLUCOSE BLDC GLUCOMTR-MCNC: 119 MG/DL — HIGH (ref 70–99)
GLUCOSE BLDC GLUCOMTR-MCNC: 169 MG/DL — HIGH (ref 70–99)
GLUCOSE BLDC GLUCOMTR-MCNC: 172 MG/DL — HIGH (ref 70–99)
GLUCOSE BLDC GLUCOMTR-MCNC: 283 MG/DL — HIGH (ref 70–99)
GLUCOSE UR QL: ABNORMAL
KETONES UR-MCNC: SIGNIFICANT CHANGE UP
LEUKOCYTE ESTERASE UR-ACNC: NEGATIVE — SIGNIFICANT CHANGE UP
NITRITE UR-MCNC: NEGATIVE — SIGNIFICANT CHANGE UP
PH UR: 6 — SIGNIFICANT CHANGE UP (ref 5–8)
PROT UR-MCNC: SIGNIFICANT CHANGE UP
SP GR SPEC: 1.02 — SIGNIFICANT CHANGE UP (ref 1.01–1.02)
UROBILINOGEN FLD QL: NEGATIVE — SIGNIFICANT CHANGE UP

## 2019-01-26 RX ADMIN — OXYCODONE HYDROCHLORIDE 10 MILLIGRAM(S): 5 TABLET ORAL at 01:00

## 2019-01-26 RX ADMIN — Medication 4 UNIT(S): at 20:02

## 2019-01-26 RX ADMIN — GABAPENTIN 300 MILLIGRAM(S): 400 CAPSULE ORAL at 14:19

## 2019-01-26 RX ADMIN — INSULIN GLARGINE 10 UNIT(S): 100 INJECTION, SOLUTION SUBCUTANEOUS at 21:59

## 2019-01-26 RX ADMIN — SENNA PLUS 2 TABLET(S): 8.6 TABLET ORAL at 21:59

## 2019-01-26 RX ADMIN — POLYETHYLENE GLYCOL 3350 17 GRAM(S): 17 POWDER, FOR SOLUTION ORAL at 11:53

## 2019-01-26 RX ADMIN — GABAPENTIN 300 MILLIGRAM(S): 400 CAPSULE ORAL at 05:24

## 2019-01-26 RX ADMIN — OXYCODONE HYDROCHLORIDE 10 MILLIGRAM(S): 5 TABLET ORAL at 20:30

## 2019-01-26 RX ADMIN — ENOXAPARIN SODIUM 40 MILLIGRAM(S): 100 INJECTION SUBCUTANEOUS at 22:04

## 2019-01-26 RX ADMIN — Medication 100 MILLIGRAM(S): at 21:59

## 2019-01-26 RX ADMIN — Medication 1: at 09:39

## 2019-01-26 RX ADMIN — OXYCODONE HYDROCHLORIDE 10 MILLIGRAM(S): 5 TABLET ORAL at 16:45

## 2019-01-26 RX ADMIN — OXYCODONE HYDROCHLORIDE 10 MILLIGRAM(S): 5 TABLET ORAL at 21:00

## 2019-01-26 RX ADMIN — OXYCODONE HYDROCHLORIDE 10 MILLIGRAM(S): 5 TABLET ORAL at 00:23

## 2019-01-26 RX ADMIN — OXYCODONE HYDROCHLORIDE 10 MILLIGRAM(S): 5 TABLET ORAL at 05:24

## 2019-01-26 RX ADMIN — Medication 4 UNIT(S): at 09:40

## 2019-01-26 RX ADMIN — Medication 100 MILLIGRAM(S): at 05:25

## 2019-01-26 RX ADMIN — OXYCODONE HYDROCHLORIDE 10 MILLIGRAM(S): 5 TABLET ORAL at 12:58

## 2019-01-26 RX ADMIN — METHOCARBAMOL 750 MILLIGRAM(S): 500 TABLET, FILM COATED ORAL at 21:59

## 2019-01-26 RX ADMIN — Medication 3: at 13:00

## 2019-01-26 RX ADMIN — OXYCODONE HYDROCHLORIDE 10 MILLIGRAM(S): 5 TABLET ORAL at 14:26

## 2019-01-26 RX ADMIN — OXYCODONE HYDROCHLORIDE 10 MILLIGRAM(S): 5 TABLET ORAL at 10:09

## 2019-01-26 RX ADMIN — GABAPENTIN 300 MILLIGRAM(S): 400 CAPSULE ORAL at 21:59

## 2019-01-26 RX ADMIN — METHOCARBAMOL 750 MILLIGRAM(S): 500 TABLET, FILM COATED ORAL at 11:52

## 2019-01-26 RX ADMIN — OXYCODONE HYDROCHLORIDE 10 MILLIGRAM(S): 5 TABLET ORAL at 05:57

## 2019-01-26 RX ADMIN — Medication 4 UNIT(S): at 13:00

## 2019-01-26 NOTE — PROGRESS NOTE ADULT - SUBJECTIVE AND OBJECTIVE BOX
SUBJECTIVE: Patient seen, laying in bed.  States that incisional pain and pain in Left thigh improved    CHIEF COMPLAINT: back pain    OVERNIGHT EVENTS: fever, pancultured    T(C): 37.6 (26 Jan 2019 10:01), Max: 38.9 (25 Jan 2019 22:42)  T(F): 99.6 (26 Jan 2019 10:01), Max: 102.1 (25 Jan 2019 22:42)  HR: 95 (26 Jan 2019 10:01) (80 - 118)  BP: 102/67 (26 Jan 2019 10:01) (102/67 - 127/85)  BP(mean): --  ABP: --  ABP(mean): --  RR: 18 (26 Jan 2019 10:01) (18 - 18)  SpO2: 97% (26 Jan 2019 10:01) (97% - 100%)      DRAINS: none    PHYSICAL EXAM:    Constitutional: No Acute Distress     Neurological: AOx3, Following Commands, Moving all Extremities     Motor exam:          Upper extremity                         Delt     Bicep     Tricep    HG                                                 R         5/5        5/5        5/5       5/5                                               L          5/5        5/5        5/5       5/5          Lower extremity                        HF         KF        KE       DF         PF                                                  R        4/5        5/5        5/5       5/5         5/5                                               L         4/5        5/5       5/5       5/5          5/5                                                 Sensation: [X] intact to light touch  [] decreased:     Pulmonary: Clear to Auscultation, No rales, No rhonchi, No wheezes     Cardiovascular: S1, S2, Regular rate and rhythm     Gastrointestinal: Soft, Non-tender, Non-distended     Extremities: No calf tenderness     Incision: dressing in place, clean and dry    LABS:                        13.7   16.66 )-----------( 300      ( 25 Jan 2019 08:44 )             40.2    01-25    140  |  102  |  10  ----------------------------<  185<H>  4.1   |  28  |  0.87    Ca    9.1      25 Jan 2019 07:11    Urinalysis (01.26.19 @ 00:18)    pH Urine: 6.0    Glucose Qualitative, Urine: 1000 mg/dL    Blood, Urine: Negative    Color: Light Yellow    Urine Appearance: Clear    Bilirubin: Negative    Ketone - Urine: Trace    Specific Gravity: 1.019    Protein, Urine: Trace    Urobilinogen: Negative    Nitrite: Negative    Leukocyte Esterase Concentration: Negative      CAPILLARY BLOOD GLUCOSE      POCT Blood Glucose.: 172 mg/dL (26 Jan 2019 09:16)  POCT Blood Glucose.: 220 mg/dL (25 Jan 2019 21:42)  POCT Blood Glucose.: 217 mg/dL (25 Jan 2019 18:15)  POCT Blood Glucose.: 176 mg/dL (25 Jan 2019 13:36)      MEDICATIONS  (STANDING):  dextrose 5%. 1000 milliLiter(s) (50 mL/Hr) IV Continuous <Continuous>  dextrose 50% Injectable 12.5 Gram(s) IV Push once  dextrose 50% Injectable 25 Gram(s) IV Push once  dextrose 50% Injectable 25 Gram(s) IV Push once  docusate sodium 100 milliGRAM(s) Oral three times a day  enoxaparin Injectable 40 milliGRAM(s) SubCutaneous at bedtime  gabapentin 300 milliGRAM(s) Oral every 8 hours  insulin glargine Injectable (LANTUS) 10 Unit(s) SubCutaneous at bedtime  insulin lispro (HumaLOG) corrective regimen sliding scale   SubCutaneous three times a day before meals  insulin lispro (HumaLOG) corrective regimen sliding scale   SubCutaneous at bedtime  insulin lispro Injectable (HumaLOG) 4 Unit(s) SubCutaneous three times a day before meals  polyethylene glycol 3350 17 Gram(s) Oral daily  senna 2 Tablet(s) Oral at bedtime    MEDICATIONS  (PRN):  acetaminophen   Tablet .. 650 milliGRAM(s) Oral every 6 hours PRN Temp greater or equal to 38C (100.4F), Mild Pain (1 - 3)  ascorbic acid 500 milliGRAM(s) Oral two times a day PRN wound healing  dextrose 40% Gel 15 Gram(s) Oral once PRN Blood Glucose LESS THAN 70 milliGRAM(s)/deciliter  glucagon  Injectable 1 milliGRAM(s) IntraMuscular once PRN Glucose LESS THAN 70 milligrams/deciliter  magnesium hydroxide Suspension 30 milliLiter(s) Oral every 12 hours PRN Constipation  methocarbamol 750 milliGRAM(s) Oral every 6 hours PRN Back Spasms  oxyCODONE    IR 5 milliGRAM(s) Oral every 3 hours PRN Moderate Pain (4 - 6)  oxyCODONE    IR 10 milliGRAM(s) Oral every 4 hours PRN Severe Pain (7 - 10)        DIET: [] Regular [X] CCD [] Renal [] Puree [] Dysphagia [] Tube Feeds:     IMAGING: < from: Xray Chest 1 View- PORTABLE-Routine (01.25.19 @ 23:34) >  Impression:    The heart is normal in size. The lungs are clear. No change in appearance   the chest when compared to previous study done July 16, thousand 11..    < end of copied text >    < from: CT Lumbar Spine No Cont (01.25.19 @ 12:27) >  IMPRESSION:      Status post posterior lumbar interbody fusion and pedicle screw fixation   at the level of L5-S1 with associated postsurgical changes.    < end of copied text >    < from: [facility] Duplex Lower Ext Vein Scan, Bilat (01.25.19 @ 12:08) >  IMPRESSION:     No evidence of bilateral lower extremity deep venous thrombosis.    < end of copied text >

## 2019-01-26 NOTE — PROGRESS NOTE ADULT - ASSESSMENT
HPI:  39 y/o male with PMHx of sickle cell trait, possible TIA (2012), RBC microcytosis, chronic lower back pain c/o intermittent worsening lower back pain radiating down both legs with associated tingling in both feet. Patient reports receiving steroid injections, most recent in November with little relief. Diagnostic imaging revealed L5-S1 disc extrusion. Evaluated by Dr. Arce. Presents to PST for a scheduled L5-S1 bilateral laminotomies, possible laminectomy and excision of herniated disc on 1/15/2019. (08 Jan 2019 18:44)    PROCEDURE: 1/22 S/P B/L L5-S1 TLIF w/ pedicle screw fixation; intraoperative CSF leak with primary repair     POD# 4 	      PLAN:  NEURO: Will increase neurontin 300 TID, CT L/S stable, OXY prn, methocarbamol prn for spasms; PT-p, can be OOB today  PULM: room air, incentive spirometry  ENDO: Fingersticks improved on current lantus/ humalog regimen.  Endocrine consult appreciated;   HEME/ONC:  H/H stable             DVT ppx: [X] SQL [] SQH [X] Venodynes bilaterally   RENAL: IVL,  ID: febrile overnight, UA negative, CXR WNL, LE dopplers neg, will await blood cultures; encouraged to use incentive spirometer  GI: CCD diet, bowel regimen  DISCHARGE PLANNING: PT recommended home with outpatient PT.  Anticipate discharge home on Monday.      Assessment:  Please Check When Present   []  GCS  E   V  M     Heart Failure: []Acute, [] acute on chronic , []chronic  Heart Failure:  [] Diastolic (HFpEF), [] Systolic (HFrEF), []Combined (HFpEF and HFrEF), [] RHF, [] Pulm HTN, [] Other    [] ANA MARIA, [] ATN, [] AIN, [] other  [] CKD1, [] CKD2, [] CKD 3, [] CKD 4, [] CKD 5, []ESRD    Encephalopathy: [] Metabolic, [] Hepatic, [] toxic, [] Neurological, [] Other    Abnormal Nurtitional Status: [] malnurtition (see nutrition note), [ ]underweight: BMI < 19, [] morbid obesity: BMI >40, [] Cachexia    [] Sepsis  [] hypovolemic shock,[] cardiogenic shock, [] hemorrhagic shock, [] neuogenic shock  [] Acute Respiratory Failure  []Cerebral edema, [] Brain compression/ herniation,   [] Functional quadriplegia  [] Acute blood loss anemia    Will discuss plan with Dr. Claude Marquez # [id number]

## 2019-01-26 NOTE — PROGRESS NOTE ADULT - ATTENDING COMMENTS
Pt is alert, awake, in NAD.  Tmax 102.  Now afebrile.  Has some back pain but thigh burning improved, some left hip pain, and Robaxin is helping.  MATSON well and has been OOB today ambulating.  MATSON well.  Wound is c/d/i, no collections.  UA, CXR negative.  LS spine CT showed instruments in acceptable position.  Pending cultures and repeat CBC.  Plan possible discharge on Monday pending fever curve.  Use incentive spirometry
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
## 2019-01-27 DIAGNOSIS — R50.9 FEVER, UNSPECIFIED: ICD-10-CM

## 2019-01-27 LAB
ANION GAP SERPL CALC-SCNC: 11 MMOL/L — SIGNIFICANT CHANGE UP (ref 5–17)
BUN SERPL-MCNC: 10 MG/DL — SIGNIFICANT CHANGE UP (ref 7–23)
CALCIUM SERPL-MCNC: 9 MG/DL — SIGNIFICANT CHANGE UP (ref 8.4–10.5)
CHLORIDE SERPL-SCNC: 98 MMOL/L — SIGNIFICANT CHANGE UP (ref 96–108)
CO2 SERPL-SCNC: 26 MMOL/L — SIGNIFICANT CHANGE UP (ref 22–31)
CREAT SERPL-MCNC: 0.96 MG/DL — SIGNIFICANT CHANGE UP (ref 0.5–1.3)
GLUCOSE BLDC GLUCOMTR-MCNC: 142 MG/DL — HIGH (ref 70–99)
GLUCOSE BLDC GLUCOMTR-MCNC: 158 MG/DL — HIGH (ref 70–99)
GLUCOSE BLDC GLUCOMTR-MCNC: 180 MG/DL — HIGH (ref 70–99)
GLUCOSE BLDC GLUCOMTR-MCNC: 188 MG/DL — HIGH (ref 70–99)
GLUCOSE BLDC GLUCOMTR-MCNC: 200 MG/DL — HIGH (ref 70–99)
GLUCOSE SERPL-MCNC: 154 MG/DL — HIGH (ref 70–99)
HCT VFR BLD CALC: 37.2 % — LOW (ref 39–50)
HGB BLD-MCNC: 12.7 G/DL — LOW (ref 13–17)
MCHC RBC-ENTMCNC: 26.6 PG — LOW (ref 27–34)
MCHC RBC-ENTMCNC: 34.1 GM/DL — SIGNIFICANT CHANGE UP (ref 32–36)
MCV RBC AUTO: 77.8 FL — LOW (ref 80–100)
PLATELET # BLD AUTO: 286 K/UL — SIGNIFICANT CHANGE UP (ref 150–400)
POTASSIUM SERPL-MCNC: 3.8 MMOL/L — SIGNIFICANT CHANGE UP (ref 3.5–5.3)
POTASSIUM SERPL-SCNC: 3.8 MMOL/L — SIGNIFICANT CHANGE UP (ref 3.5–5.3)
RBC # BLD: 4.78 M/UL — SIGNIFICANT CHANGE UP (ref 4.2–5.8)
RBC # FLD: 13.9 % — SIGNIFICANT CHANGE UP (ref 10.3–14.5)
SODIUM SERPL-SCNC: 135 MMOL/L — SIGNIFICANT CHANGE UP (ref 135–145)
WBC # BLD: 16.83 K/UL — HIGH (ref 3.8–10.5)
WBC # FLD AUTO: 16.83 K/UL — HIGH (ref 3.8–10.5)

## 2019-01-27 PROCEDURE — 99233 SBSQ HOSP IP/OBS HIGH 50: CPT

## 2019-01-27 RX ORDER — SODIUM CHLORIDE 9 MG/ML
1000 INJECTION INTRAMUSCULAR; INTRAVENOUS; SUBCUTANEOUS ONCE
Qty: 0 | Refills: 0 | Status: COMPLETED | OUTPATIENT
Start: 2019-01-27 | End: 2019-01-27

## 2019-01-27 RX ADMIN — SENNA PLUS 2 TABLET(S): 8.6 TABLET ORAL at 22:46

## 2019-01-27 RX ADMIN — OXYCODONE HYDROCHLORIDE 10 MILLIGRAM(S): 5 TABLET ORAL at 20:45

## 2019-01-27 RX ADMIN — OXYCODONE HYDROCHLORIDE 10 MILLIGRAM(S): 5 TABLET ORAL at 09:57

## 2019-01-27 RX ADMIN — Medication 100 MILLIGRAM(S): at 04:38

## 2019-01-27 RX ADMIN — OXYCODONE HYDROCHLORIDE 10 MILLIGRAM(S): 5 TABLET ORAL at 10:32

## 2019-01-27 RX ADMIN — OXYCODONE HYDROCHLORIDE 10 MILLIGRAM(S): 5 TABLET ORAL at 01:58

## 2019-01-27 RX ADMIN — OXYCODONE HYDROCHLORIDE 10 MILLIGRAM(S): 5 TABLET ORAL at 16:32

## 2019-01-27 RX ADMIN — GABAPENTIN 300 MILLIGRAM(S): 400 CAPSULE ORAL at 14:21

## 2019-01-27 RX ADMIN — INSULIN GLARGINE 10 UNIT(S): 100 INJECTION, SOLUTION SUBCUTANEOUS at 22:46

## 2019-01-27 RX ADMIN — OXYCODONE HYDROCHLORIDE 10 MILLIGRAM(S): 5 TABLET ORAL at 17:00

## 2019-01-27 RX ADMIN — OXYCODONE HYDROCHLORIDE 10 MILLIGRAM(S): 5 TABLET ORAL at 20:18

## 2019-01-27 RX ADMIN — Medication 100 MILLIGRAM(S): at 22:46

## 2019-01-27 RX ADMIN — GABAPENTIN 300 MILLIGRAM(S): 400 CAPSULE ORAL at 22:46

## 2019-01-27 RX ADMIN — SODIUM CHLORIDE 4000 MILLILITER(S): 9 INJECTION INTRAMUSCULAR; INTRAVENOUS; SUBCUTANEOUS at 11:21

## 2019-01-27 RX ADMIN — Medication 4 UNIT(S): at 13:09

## 2019-01-27 RX ADMIN — ENOXAPARIN SODIUM 40 MILLIGRAM(S): 100 INJECTION SUBCUTANEOUS at 22:46

## 2019-01-27 RX ADMIN — Medication 4 UNIT(S): at 09:21

## 2019-01-27 RX ADMIN — Medication 100 MILLIGRAM(S): at 14:21

## 2019-01-27 RX ADMIN — METHOCARBAMOL 750 MILLIGRAM(S): 500 TABLET, FILM COATED ORAL at 22:46

## 2019-01-27 RX ADMIN — Medication 1: at 19:03

## 2019-01-27 RX ADMIN — Medication 4 UNIT(S): at 19:03

## 2019-01-27 RX ADMIN — Medication 1: at 09:20

## 2019-01-27 RX ADMIN — Medication 1: at 13:08

## 2019-01-27 RX ADMIN — GABAPENTIN 300 MILLIGRAM(S): 400 CAPSULE ORAL at 04:38

## 2019-01-27 RX ADMIN — OXYCODONE HYDROCHLORIDE 10 MILLIGRAM(S): 5 TABLET ORAL at 02:30

## 2019-01-27 RX ADMIN — METHOCARBAMOL 750 MILLIGRAM(S): 500 TABLET, FILM COATED ORAL at 04:38

## 2019-01-27 RX ADMIN — OXYCODONE HYDROCHLORIDE 10 MILLIGRAM(S): 5 TABLET ORAL at 00:00

## 2019-01-27 RX ADMIN — METHOCARBAMOL 750 MILLIGRAM(S): 500 TABLET, FILM COATED ORAL at 14:21

## 2019-01-27 NOTE — PROGRESS NOTE ADULT - SUBJECTIVE AND OBJECTIVE BOX
SUBJECTIVE: Patient seen and examined at bedside. Had a vasovagal episode in bathroom.      OVERNIGHT EVENTS: none    Vital Signs Last 24 Hrs  T(C): 36.9 (27 Jan 2019 11:03), Max: 37.8 (27 Jan 2019 04:34)  T(F): 98.5 (27 Jan 2019 11:03), Max: 100.1 (27 Jan 2019 04:34)  HR: 93 (27 Jan 2019 11:03) (93 - 105)  BP: 111/75 (27 Jan 2019 11:03) (102/67 - 122/77)  BP(mean): --  RR: 18 (27 Jan 2019 11:03) (18 - 18)  SpO2: 97% (27 Jan 2019 11:03) (94% - 99%)      PHYSICAL EXAM:    Constitutional: No Acute Distress     Neurological: AOx3, Following Commands, Moving all Extremities              Motor exam:          Upper extremity                         Delt     Bicep     Tricep    HG                                                 R         5/5        5/5        5/5       5/5                                               L          5/5        5/5        5/5       5/5          Lower extremity                        HF         KF        KE       DF         PF                                                  R        4/5        5/5        5/5       5/5         5/5                                               L         4/5        5/5       5/5       5/5          5/5                                                 Sensation: [X] intact to light touch  [] decreased:     Pulmonary: Clear to Auscultation, No rales, No rhonchi, No wheezes     Cardiovascular: S1, S2, Regular rate and rhythm     Gastrointestinal: Soft, Non-tender, Non-distended     Extremities: No calf tenderness     Incision: c/d/i     LABS:                                      12.7   16.83 )-----------( 286      ( 27 Jan 2019 08:24 )             37.2   01-27    135  |  98  |  10  ----------------------------<  154<H>  3.8   |  26  |  0.96    Ca    9.0      27 Jan 2019 07:27        DIET: CCD diet     IMAGING: no new imaging

## 2019-01-27 NOTE — PROGRESS NOTE ADULT - ASSESSMENT
37 y/o male with PMHx of sickle cell trait, possible TIA (2012), RBC microcytosis, chronic lower back pain c/o intermittent worsening lower back pain radiating down both legs with associated tingling in both feet. Patient reports receiving steroid injections, most recent in November with little relief. Diagnostic imaging revealed L5-S1 disc extrusion. Evaluated by Dr. Arce. Presents to PST for a scheduled L5-S1 bilateral laminotomies, possible laminectomy and excision of herniated disc on 1/15/2019. (08 Jan 2019 18:44)    PROCEDURE: 1/22 S/P B/L L5-S1 TLIF w/ pedicle screw fixation; intraoperative CSF leak with primary repair  POD #5  	  PLAN:  -Neuro: continue neurotin. Oxy IR for pain control. Robaxin for muscle spasm.   -Vasovagal episode earlier today. Will give 1L bolus and orthostatics as discussed with hospitalist.    -Leukocytosis. Patient is asymptomatic and currently afebrile. Repeat labs in am.   -Encouraged incentive spirometry  -Endocrine following for elevated hba1c. Follow up appreciated.  -DVT ppx: venodynes and sql  -PT: home PT when stable    Will discuss with Dr. Arce  Spectralink #[id number]      Assessment:  Please Check When Present   []  GCS  E   V  M     Heart Failure: []Acute, [] acute on chronic , []chronic  Heart Failure:  [] Diastolic (HFpEF), [] Systolic (HFrEF), []Combined (HFpEF and HFrEF), [] RHF, [] Pulm HTN, [] Other    [] ANA MARIA, [] ATN, [] AIN, [] other  [] CKD1, [] CKD2, [] CKD 3, [] CKD 4, [] CKD 5, []ESRD    Encephalopathy: [] Metabolic, [] Hepatic, [] toxic, [] Neurological, [] Other    Abnormal Nurtitional Status: [] malnurtition (see nutrition note), [ ]underweight: BMI < 19, [] morbid obesity: BMI >40, [] Cachexia    [] Sepsis  [] hypovolemic shock,[] cardiogenic shock, [] hemorrhagic shock, [] neuogenic shock  [] Acute Respiratory Failure  []Cerebral edema, [] Brain compression/ herniation,   [] Functional quadriplegia  [] Acute blood loss anemia

## 2019-01-27 NOTE — PROGRESS NOTE ADULT - PROBLEM SELECTOR PLAN 1
- s/p L5-S1 bilateral laminotomies, laminectomy and excision of herniated disc on 1/22/2019.  - Pain fairly well controlled

## 2019-01-27 NOTE — PROGRESS NOTE ADULT - ASSESSMENT
38M with h/o sickle cell trait, possible TIA (2012), RBC microcytosis, chronic lower back pain who presented with intermittent worsening lower back pain radiating down both legs with associated tingling in both feet. Imaging shows spondylitic changes at the L4-L5 and L5-S1 levels with disc protrusions most prominent at the L5-S1 level.  Patient is s/p L5-S1 bilateral laminotomies,  laminectomy and excision of herniated disc on 1/22/2019.

## 2019-01-27 NOTE — PROVIDER CONTACT NOTE (OTHER) - SITUATION
pt in bathroom with wife and diaphortic and pale and complaining of dizziness. staff assisted patient into chair. bp 111/75 when back in chair.

## 2019-01-27 NOTE — PROGRESS NOTE ADULT - SUBJECTIVE AND OBJECTIVE BOX
Patient is a 38y old  Male who presents with a chief complaint of back pain (2019 11:03)      SUBJECTIVE / OVERNIGHT EVENTS:    MEDICATIONS  (STANDING):  dextrose 5%. 1000 milliLiter(s) (50 mL/Hr) IV Continuous <Continuous>  dextrose 50% Injectable 12.5 Gram(s) IV Push once  dextrose 50% Injectable 25 Gram(s) IV Push once  dextrose 50% Injectable 25 Gram(s) IV Push once  docusate sodium 100 milliGRAM(s) Oral three times a day  enoxaparin Injectable 40 milliGRAM(s) SubCutaneous at bedtime  gabapentin 300 milliGRAM(s) Oral every 8 hours  insulin glargine Injectable (LANTUS) 10 Unit(s) SubCutaneous at bedtime  insulin lispro (HumaLOG) corrective regimen sliding scale   SubCutaneous three times a day before meals  insulin lispro (HumaLOG) corrective regimen sliding scale   SubCutaneous at bedtime  insulin lispro Injectable (HumaLOG) 4 Unit(s) SubCutaneous three times a day before meals  polyethylene glycol 3350 17 Gram(s) Oral daily  senna 2 Tablet(s) Oral at bedtime  sodium chloride 0.9% Bolus 1000 milliLiter(s) IV Bolus once    MEDICATIONS  (PRN):  acetaminophen   Tablet .. 650 milliGRAM(s) Oral every 6 hours PRN Temp greater or equal to 38C (100.4F), Mild Pain (1 - 3)  ascorbic acid 500 milliGRAM(s) Oral two times a day PRN wound healing  dextrose 40% Gel 15 Gram(s) Oral once PRN Blood Glucose LESS THAN 70 milliGRAM(s)/deciliter  glucagon  Injectable 1 milliGRAM(s) IntraMuscular once PRN Glucose LESS THAN 70 milligrams/deciliter  magnesium hydroxide Suspension 30 milliLiter(s) Oral every 12 hours PRN Constipation  methocarbamol 750 milliGRAM(s) Oral every 6 hours PRN Back Spasms  oxyCODONE    IR 5 milliGRAM(s) Oral every 3 hours PRN Moderate Pain (4 - 6)  oxyCODONE    IR 10 milliGRAM(s) Oral every 4 hours PRN Severe Pain (7 - 10)      Vital Signs Last 24 Hrs  T(C): 36.9 (2019 11:03), Max: 37.8 (2019 04:34)  T(F): 98.5 (2019 11:03), Max: 100.1 (2019 04:34)  HR: 93 (2019 11:03) (93 - 105)  BP: 111/75 (2019 11:03) (102/67 - 122/77)  BP(mean): --  RR: 18 (2019 11:03) (18 - 18)  SpO2: 97% (2019 11:03) (94% - 99%)  CAPILLARY BLOOD GLUCOSE      POCT Blood Glucose.: 158 mg/dL (2019 09:18)  POCT Blood Glucose.: 142 mg/dL (2019 07:52)  POCT Blood Glucose.: 169 mg/dL (2019 21:57)  POCT Blood Glucose.: 119 mg/dL (2019 19:17)  POCT Blood Glucose.: 283 mg/dL (2019 12:19)    I&O's Summary    2019 07:01  -  2019 07:00  --------------------------------------------------------  IN: 450 mL / OUT: 2375 mL / NET: -1925 mL        PHYSICAL EXAM:  GENERAL: NAD, well-developed  HEAD:  Atraumatic, Normocephalic  EYES: EOMI, PERRLA, conjunctiva and sclera clear  NECK: Supple, No JVD  CHEST/LUNG: Clear to auscultation bilaterally; No wheeze  HEART: Regular rate and rhythm; No murmurs, rubs, or gallops  ABDOMEN: Soft, Nontender, Nondistended; Bowel sounds present  EXTREMITIES:  2+ Peripheral Pulses, No clubbing, cyanosis, or edema  PSYCH: AAOx3  NEUROLOGY: non-focal  SKIN: No rashes or lesions    LABS:                        12.7   16.83 )-----------( 286      ( 2019 08:24 )             37.2         135  |  98  |  10  ----------------------------<  154<H>  3.8   |  26  |  0.96    Ca    9.0      2019 07:27            Urinalysis Basic - ( 2019 00:18 )    Color: Light Yellow / Appearance: Clear / S.019 / pH: x  Gluc: x / Ketone: Trace  / Bili: Negative / Urobili: Negative   Blood: x / Protein: Trace / Nitrite: Negative   Leuk Esterase: Negative / RBC: x / WBC x   Sq Epi: x / Non Sq Epi: x / Bacteria: x        RADIOLOGY & ADDITIONAL TESTS:    Imaging Personally Reviewed:    Consultant(s) Notes Reviewed:      Care Discussed with Consultants/Other Providers: Patient is a 38y old  Male who presents with a chief complaint of back pain (2019 11:03)      SUBJECTIVE / OVERNIGHT EVENTS: Pt seen and examined with family at bedside. Had a vasovagal episode  in the bathroom this AM while urinating ; denies LOC, did not hit his head.   Pt has also been having low grade fevers 100.1F. Denies cough, sob, abd pain, diarrhea, urinary symptoms.    MEDICATIONS  (STANDING):  dextrose 5%. 1000 milliLiter(s) (50 mL/Hr) IV Continuous <Continuous>  dextrose 50% Injectable 12.5 Gram(s) IV Push once  dextrose 50% Injectable 25 Gram(s) IV Push once  dextrose 50% Injectable 25 Gram(s) IV Push once  docusate sodium 100 milliGRAM(s) Oral three times a day  enoxaparin Injectable 40 milliGRAM(s) SubCutaneous at bedtime  gabapentin 300 milliGRAM(s) Oral every 8 hours  insulin glargine Injectable (LANTUS) 10 Unit(s) SubCutaneous at bedtime  insulin lispro (HumaLOG) corrective regimen sliding scale   SubCutaneous three times a day before meals  insulin lispro (HumaLOG) corrective regimen sliding scale   SubCutaneous at bedtime  insulin lispro Injectable (HumaLOG) 4 Unit(s) SubCutaneous three times a day before meals  polyethylene glycol 3350 17 Gram(s) Oral daily  senna 2 Tablet(s) Oral at bedtime  sodium chloride 0.9% Bolus 1000 milliLiter(s) IV Bolus once    MEDICATIONS  (PRN):  acetaminophen   Tablet .. 650 milliGRAM(s) Oral every 6 hours PRN Temp greater or equal to 38C (100.4F), Mild Pain (1 - 3)  ascorbic acid 500 milliGRAM(s) Oral two times a day PRN wound healing  dextrose 40% Gel 15 Gram(s) Oral once PRN Blood Glucose LESS THAN 70 milliGRAM(s)/deciliter  glucagon  Injectable 1 milliGRAM(s) IntraMuscular once PRN Glucose LESS THAN 70 milligrams/deciliter  magnesium hydroxide Suspension 30 milliLiter(s) Oral every 12 hours PRN Constipation  methocarbamol 750 milliGRAM(s) Oral every 6 hours PRN Back Spasms  oxyCODONE    IR 5 milliGRAM(s) Oral every 3 hours PRN Moderate Pain (4 - 6)  oxyCODONE    IR 10 milliGRAM(s) Oral every 4 hours PRN Severe Pain (7 - 10)      Vital Signs Last 24 Hrs  T(C): 36.9 (2019 11:03), Max: 37.8 (2019 04:34)  T(F): 98.5 (2019 11:03), Max: 100.1 (2019 04:34)  HR: 93 (2019 11:03) (93 - 105)  BP: 111/75 (2019 11:03) (102/67 - 122/77)  BP(mean): --  RR: 18 (2019 11:03) (18 - 18)  SpO2: 97% (2019 11:03) (94% - 99%)  CAPILLARY BLOOD GLUCOSE      POCT Blood Glucose.: 158 mg/dL (2019 09:18)  POCT Blood Glucose.: 142 mg/dL (2019 07:52)  POCT Blood Glucose.: 169 mg/dL (2019 21:57)  POCT Blood Glucose.: 119 mg/dL (2019 19:17)  POCT Blood Glucose.: 283 mg/dL (2019 12:19)    I&O's Summary    2019 07:01  -  2019 07:00  --------------------------------------------------------  IN: 450 mL / OUT: 2375 mL / NET: -1925 mL        PHYSICAL EXAM:  GENERAL: NAD, anicteric, afebrile  HEAD:  Atraumatic, Normocephalic  EYES: EOMI, PERRLA, conjunctiva and sclera clear  NECK: Supple, No JVD  CHEST/LUNG: Clear to auscultation bilaterally; No wheeze  HEART: Regular rate and rhythm; No murmurs, rubs, or gallops  ABDOMEN: Soft, Nontender, Nondistended; Bowel sounds present  EXTREMITIES:  2+ Peripheral Pulses, No clubbing, cyanosis, or edema  PSYCH: AAOx3  NEUROLOGY: non-focal  SKIN: incision c/d/i    LABS:                        12.7   16.83 )-----------( 286      ( 2019 08:24 )             37.2         135  |  98  |  10  ----------------------------<  154<H>  3.8   |  26  |  0.96    Ca    9.0      2019 07:27            Urinalysis Basic - ( 2019 00:18 )    Color: Light Yellow / Appearance: Clear / S.019 / pH: x  Gluc: x / Ketone: Trace  / Bili: Negative / Urobili: Negative   Blood: x / Protein: Trace / Nitrite: Negative   Leuk Esterase: Negative / RBC: x / WBC x   Sq Epi: x / Non Sq Epi: x / Bacteria: x

## 2019-01-28 ENCOUNTER — TRANSCRIPTION ENCOUNTER (OUTPATIENT)
Age: 39
End: 2019-01-28

## 2019-01-28 DIAGNOSIS — R55 SYNCOPE AND COLLAPSE: ICD-10-CM

## 2019-01-28 LAB
ANION GAP SERPL CALC-SCNC: 10 MMOL/L — SIGNIFICANT CHANGE UP (ref 5–17)
BUN SERPL-MCNC: 9 MG/DL — SIGNIFICANT CHANGE UP (ref 7–23)
CALCIUM SERPL-MCNC: 8.8 MG/DL — SIGNIFICANT CHANGE UP (ref 8.4–10.5)
CHLORIDE SERPL-SCNC: 100 MMOL/L — SIGNIFICANT CHANGE UP (ref 96–108)
CO2 SERPL-SCNC: 27 MMOL/L — SIGNIFICANT CHANGE UP (ref 22–31)
CREAT SERPL-MCNC: 0.85 MG/DL — SIGNIFICANT CHANGE UP (ref 0.5–1.3)
GLUCOSE BLDC GLUCOMTR-MCNC: 161 MG/DL — HIGH (ref 70–99)
GLUCOSE BLDC GLUCOMTR-MCNC: 165 MG/DL — HIGH (ref 70–99)
GLUCOSE BLDC GLUCOMTR-MCNC: 170 MG/DL — HIGH (ref 70–99)
GLUCOSE BLDC GLUCOMTR-MCNC: 198 MG/DL — HIGH (ref 70–99)
GLUCOSE SERPL-MCNC: 170 MG/DL — HIGH (ref 70–99)
HCT VFR BLD CALC: 36.3 % — LOW (ref 39–50)
HGB BLD-MCNC: 12.4 G/DL — LOW (ref 13–17)
MCHC RBC-ENTMCNC: 27 PG — SIGNIFICANT CHANGE UP (ref 27–34)
MCHC RBC-ENTMCNC: 34.2 GM/DL — SIGNIFICANT CHANGE UP (ref 32–36)
MCV RBC AUTO: 79.1 FL — LOW (ref 80–100)
PLATELET # BLD AUTO: 295 K/UL — SIGNIFICANT CHANGE UP (ref 150–400)
POTASSIUM SERPL-MCNC: 4.2 MMOL/L — SIGNIFICANT CHANGE UP (ref 3.5–5.3)
POTASSIUM SERPL-SCNC: 4.2 MMOL/L — SIGNIFICANT CHANGE UP (ref 3.5–5.3)
RBC # BLD: 4.59 M/UL — SIGNIFICANT CHANGE UP (ref 4.2–5.8)
RBC # FLD: 13.8 % — SIGNIFICANT CHANGE UP (ref 10.3–14.5)
SODIUM SERPL-SCNC: 137 MMOL/L — SIGNIFICANT CHANGE UP (ref 135–145)
WBC # BLD: 16.14 K/UL — HIGH (ref 3.8–10.5)
WBC # FLD AUTO: 16.14 K/UL — HIGH (ref 3.8–10.5)

## 2019-01-28 PROCEDURE — 99232 SBSQ HOSP IP/OBS MODERATE 35: CPT | Mod: GC

## 2019-01-28 PROCEDURE — 99232 SBSQ HOSP IP/OBS MODERATE 35: CPT

## 2019-01-28 RX ORDER — MULTIVIT WITH MIN/MFOLATE/K2 340-15/3 G
1 POWDER (GRAM) ORAL ONCE
Qty: 0 | Refills: 0 | Status: COMPLETED | OUTPATIENT
Start: 2019-01-28 | End: 2019-01-28

## 2019-01-28 RX ADMIN — METHOCARBAMOL 750 MILLIGRAM(S): 500 TABLET, FILM COATED ORAL at 20:28

## 2019-01-28 RX ADMIN — Medication 4 UNIT(S): at 12:16

## 2019-01-28 RX ADMIN — Medication 100 MILLIGRAM(S): at 21:34

## 2019-01-28 RX ADMIN — OXYCODONE HYDROCHLORIDE 10 MILLIGRAM(S): 5 TABLET ORAL at 12:15

## 2019-01-28 RX ADMIN — POLYETHYLENE GLYCOL 3350 17 GRAM(S): 17 POWDER, FOR SOLUTION ORAL at 10:50

## 2019-01-28 RX ADMIN — INSULIN GLARGINE 10 UNIT(S): 100 INJECTION, SOLUTION SUBCUTANEOUS at 22:12

## 2019-01-28 RX ADMIN — ENOXAPARIN SODIUM 40 MILLIGRAM(S): 100 INJECTION SUBCUTANEOUS at 21:34

## 2019-01-28 RX ADMIN — SENNA PLUS 2 TABLET(S): 8.6 TABLET ORAL at 21:34

## 2019-01-28 RX ADMIN — OXYCODONE HYDROCHLORIDE 10 MILLIGRAM(S): 5 TABLET ORAL at 16:58

## 2019-01-28 RX ADMIN — Medication 1: at 18:47

## 2019-01-28 RX ADMIN — OXYCODONE HYDROCHLORIDE 10 MILLIGRAM(S): 5 TABLET ORAL at 21:33

## 2019-01-28 RX ADMIN — Medication 100 MILLIGRAM(S): at 06:26

## 2019-01-28 RX ADMIN — Medication 100 MILLIGRAM(S): at 12:50

## 2019-01-28 RX ADMIN — Medication 1: at 12:16

## 2019-01-28 RX ADMIN — Medication 4 UNIT(S): at 18:47

## 2019-01-28 RX ADMIN — OXYCODONE HYDROCHLORIDE 10 MILLIGRAM(S): 5 TABLET ORAL at 13:15

## 2019-01-28 RX ADMIN — OXYCODONE HYDROCHLORIDE 10 MILLIGRAM(S): 5 TABLET ORAL at 02:08

## 2019-01-28 RX ADMIN — Medication 1: at 08:14

## 2019-01-28 RX ADMIN — OXYCODONE HYDROCHLORIDE 10 MILLIGRAM(S): 5 TABLET ORAL at 22:00

## 2019-01-28 RX ADMIN — GABAPENTIN 300 MILLIGRAM(S): 400 CAPSULE ORAL at 06:26

## 2019-01-28 RX ADMIN — OXYCODONE HYDROCHLORIDE 10 MILLIGRAM(S): 5 TABLET ORAL at 09:15

## 2019-01-28 RX ADMIN — Medication 4 UNIT(S): at 08:14

## 2019-01-28 RX ADMIN — GABAPENTIN 300 MILLIGRAM(S): 400 CAPSULE ORAL at 12:50

## 2019-01-28 RX ADMIN — METHOCARBAMOL 750 MILLIGRAM(S): 500 TABLET, FILM COATED ORAL at 10:48

## 2019-01-28 RX ADMIN — OXYCODONE HYDROCHLORIDE 10 MILLIGRAM(S): 5 TABLET ORAL at 08:08

## 2019-01-28 RX ADMIN — GABAPENTIN 300 MILLIGRAM(S): 400 CAPSULE ORAL at 21:34

## 2019-01-28 NOTE — DISCHARGE NOTE ADULT - MEDICATION SUMMARY - MEDICATIONS TO STOP TAKING
I will STOP taking the medications listed below when I get home from the hospital:    Advil 200 mg oral tablet  -- 1 tab(s) by mouth every 6 hours, As Needed, pt to stop 1 week pre-op

## 2019-01-28 NOTE — DIETITIAN INITIAL EVALUATION ADULT. - NS AS NUTRI INTERV ED CONTENT
Purpose of the nutrition education/Other or related topics/Other (specify)/Pt amenable to nutrition education, discussed the following: weight management as feasible, healthy intentional wt loss while consuming adequate nutrition, balanced meal pattern, mixed meals/snacks, adequate fruit/vegetable intake and whole grain intake, lean sources of protein, foods that raise BG, carbohydrate counting and importance of SMBG, limiting refined sweets and sugar sweetened beverages. Also discussed physical activity as feasible and following up with MD/RD. Written materials provided and pt/wife made aware RD remains available./Priority modifications/Recommended modifications

## 2019-01-28 NOTE — DIETITIAN INITIAL EVALUATION ADULT. - NS AS NUTRI INTERV MEALS SNACK
General/healthful diet/Recommend continue current Consistent Carbohydrate (with evening snacks) therapeutic diet as indicated. Promote adequate BG control, recommend adjust insulin as needed. Room service ordering process reviewed, pt familiar.

## 2019-01-28 NOTE — DISCHARGE NOTE ADULT - ADDITIONAL INSTRUCTIONS
Please follow up with your neurosurgeon after discharge. please call office to make appointment   Staples will be removed at follow up appointment. Keep incision site clean and dry. OK to shower but do not scrub incision area and pat dry when done.   please keep incision clean and dry, do not submerge wound in water for prolonged periods of time, pat dry after showering, and do not use any creams or ointments to incision.

## 2019-01-28 NOTE — PROGRESS NOTE ADULT - PROBLEM SELECTOR PLAN 2
HbA1c 8.4  increase lantus to 7 units  c/w humalog 4u TID  patient is now off steroid  monitor FS glucose
- c/w BRENTON  - c/w Lantus 10U qhs , premeal Lispro 4U tid  - monitor FS qac qhs
- HbA1c 8.4  - c/w Lantus 10U qhs , premeal Lispro 4U tid while inpatient  - monitor FS qac qhs  - d/c on metformin 500mg BID on discharge per Endo

## 2019-01-28 NOTE — PROGRESS NOTE ADULT - ATTENDING COMMENTS
Patient seen and examined. Agree with note as above with addendum: patient's sister is a RN/CDE who I know very well. She is concerned that her brother's diabetes is simply from his steroids, so I explained that people without diabetes or IFG do not have bs as high as her brother's or a HbA1c of 8%. He can f/u with his PCP or at our practice if he likes. Discharge on: Metformin 500mg po bid with a meter/strips/lancets to test once daily.  Keira Herrera MD  Pager: [phone]  Nights and Weekends: [phone]

## 2019-01-28 NOTE — DISCHARGE NOTE ADULT - CARE PROVIDER_API CALL
Aren Berrios (DO), EndocrinologyMetabDiabetes; Internal Medicine  [address] [place] [address]  Phone: [phone]  Fax: [phone]    Nigel Arce), Neurological Surgery  [address] [address]  Phone: [phone]  Fax: [phone]

## 2019-01-28 NOTE — DIETITIAN INITIAL EVALUATION ADULT. - ADHERENCE
No specific therapeutic diet followed PTA, however pt reports no excess intake of refined carbohydrate and does not drink sugar sweetened beverages./fair

## 2019-01-28 NOTE — DISCHARGE NOTE ADULT - PLAN OF CARE
Please follow up Neurosurgeon in one week. Please call office to make appointment. Please follow up with Primary Care Physician. Please call office to make appointment. Please follow up with your endocrinologist after discharge. Please call office to make appointment Please make an appointment for follow up with your primary care physician after discharge. Please follow up with your primary care physician/endocronologist after discharge. Please call office to make appointment

## 2019-01-28 NOTE — PROGRESS NOTE ADULT - PROBLEM SELECTOR PROBLEM 2
Diabetes
Type 2 diabetes mellitus with hyperglycemia, without long-term current use of insulin
Type 2 diabetes mellitus with hyperglycemia, without long-term current use of insulin

## 2019-01-28 NOTE — PROGRESS NOTE ADULT - PROBLEM SELECTOR PLAN 1
Patient with HbA1C of 8.4  Home regimen: Metformin 500mg once daily  Patient had steroid induce hyperglycemia, was on 4mg Decadron q6, last given 1/23/19 at 11am  Glucose now better controlled off steroids      Current Regimen:  10U Lantus at bedtime  Premeal 4U Humalog  Low correction scale    Recommendation:  If patient not discharged would just slightly Increase Lantus to 12U   Keep Pre-meal 4 U Humalog  low correction scale    With discharge planning, patient should be started on Metformin 500mg BID can titrate up to 1000mg BID if needed.   Diet and Lifestyle modification   Endocrinology outpatient follow up. Patient with HbA1C of 8.4  Home regimen: Metformin 500mg once daily  Patient had steroid induce hyperglycemia, was on 4mg Decadron q6, last given 1/23/19 at 11am  Glucose now better controlled off steroids      Current Regimen:  10U Lantus at bedtime  Premeal 4U Humalog  Low correction scale    Recommendation:  If patient not discharged would just slightly Increase Lantus to 12Units sq qhs   Keep Pre-meal 4 Units sq Humalog tid-ac  low correction scale    With discharge planning, patient should be started on Metformin 500mg BID can titrate up to 1000mg BID if needed.   Diet and Lifestyle modification   Endocrinology outpatient follow up.

## 2019-01-28 NOTE — DIETITIAN INITIAL EVALUATION ADULT. - OTHER INFO
Pt seen for length of stay on 7TOW. Pt seen for length of stay on 7TOW. Pt reports good po intake x 7 days in-patient. No c/o nausea, vomiting, diarrhea or constipation, denies chewing/swallowing difficulties. Pt reports some minimal wt loss PTA, unintentional, ~5 lbs, as he has become very busy at work and sometimes skips meals. no increase in physical activity reported. Pt reports he used to SMBG daily ~3 months ago and his usual range was  pre-meals. He stopped testing his BG since as he starting taking corticosteroid shots and was told not to test his BG. Pt BG was presumably running high as a result as reflected by his last HgbA1c of 8.4%. Pt was not on insulin PTA, only metformin. Nutrition education reviewed at this time.

## 2019-01-28 NOTE — DIETITIAN INITIAL EVALUATION ADULT. - ENERGY NEEDS
Height: 68 inches, Weight: 245 pounds (dosing, stated)  BMI: 37.2 kg/m2 IBW: 154 pounds (+/-10%), %IBW: 159%  Pertinent Info: No edema noted, no pressure injuries noted at this time in nursing flow sheet.  Other pertinent info: Pt is 38yoM with PMH significant for T2DM, sickle cell train, possible TIA (2012), RBC microcytosis, chronic lower back pain, p/w worsening back pain and found with disc extrusion and scheduled for bilateral laminotomies L5-S1, laminectomy, excision of herniated disc on 1/22. POD #6. Pt s/p vasovagal episode in bathroom yesterday AM 1/27. Noted with elevated BG in-patient.

## 2019-01-28 NOTE — DISCHARGE NOTE ADULT - NS AS ACTIVITY OBS
No Heavy lifting/straining/Do not make important decisions/Do not drive or operate machinery/Bathing allowed/Driving allowed

## 2019-01-28 NOTE — PROGRESS NOTE ADULT - PROBLEM SELECTOR PLAN 4
- suspect vasovagal/orthostatic mediated syncope in setting of severe pain, opioid, deconditioning  - s/p IVF  - pain control  - advised patient to have someone assist him when OOB and using the bathroom.
- likely vasovagal episode  - check orthostatics  - IV hydration

## 2019-01-28 NOTE — PROGRESS NOTE ADULT - SUBJECTIVE AND OBJECTIVE BOX
SUBJECTIVE: Patient seen and examined at bedside. Complains of new right hip/leg "shooting pain" while working with PT.     OVERNIGHT EVENTS: none    Vital Signs Last 24 Hrs  T(C): 36.8 (28 Jan 2019 10:48), Max: 37.3 (27 Jan 2019 16:46)  T(F): 98.2 (28 Jan 2019 10:48), Max: 99.2 (27 Jan 2019 16:46)  HR: 116 (28 Jan 2019 10:48) (86 - 116)  BP: 106/78 (28 Jan 2019 10:48) (106/78 - 119/72)  BP(mean): --  RR: 18 (28 Jan 2019 10:48) (18 - 18)  SpO2: 95% (28 Jan 2019 10:48) (95% - 100%)      PHYSICAL EXAM:    Constitutional: No Acute Distress     Neurological: AOx3, Following Commands, Moving all Extremities              Motor exam:          Upper extremity                         Delt     Bicep     Tricep    HG                                                 R         5/5        5/5        5/5       5/5                                               L          5/5        5/5        5/5       5/5          Lower extremity                        HF         KF        KE       DF         PF                                                  R        3/5        5/5        5/5       5/5         5/5                                               L         4/5        5/5       5/5       5/5          5/5                                                 Sensation: [X] intact to light touch  [] decreased:     Pulmonary: Clear to Auscultation, No rales, No rhonchi, No wheezes     Cardiovascular: S1, S2, Regular rate and rhythm     Gastrointestinal: Soft, Non-tender, Non-distended     Extremities: No calf tenderness     Incision: c/d/i     LABS:                                   12.4   16.14 )-----------( 295      ( 28 Jan 2019 08:28 )             36.3   01-28    137  |  100  |  9   ----------------------------<  170<H>  4.2   |  27  |  0.85    Ca    8.8      28 Jan 2019 07:10          DIET: CCD diet     IMAGING: MRI lumbar spine pending

## 2019-01-28 NOTE — PROGRESS NOTE ADULT - PROBLEM SELECTOR PROBLEM 1
Back pain
Type 2 diabetes mellitus with hyperglycemia, without long-term current use of insulin
Disc displacement, lumbar
Disc displacement, lumbar

## 2019-01-28 NOTE — DISCHARGE NOTE ADULT - MEDICATION SUMMARY - MEDICATIONS TO TAKE
I will START or STAY ON the medications listed below when I get home from the hospital:    test strips for glucometer  -- Check blood glucose 4 times a day before meals and at bedtime  -- Indication: For Diabetes    glucometer  -- Use as directed  -- Indication: For Diabetes    lancet  -- Use 4 times a day before meals and at bedtime  -- Indication: For Diabetes    Rolling Walker s/p spine surgery   -- Indication: For S/p spine surgery    oxyCODONE 10 mg oral tablet  -- 1 tab(s) by mouth every 4 hours, As needed, Severe Pain (7 - 10) MDD:6  -- Indication: For Severe pain    gabapentin 300 mg oral capsule  -- 1 cap(s) by mouth every 8 hours MDD:3  -- Indication: For neuropathy    metFORMIN 500 mg oral tablet, extended release  -- 1 tab(s) by mouth 2 times a day MDD:2  -- Indication: For Diabetes    senna oral tablet  -- 2 tab(s) by mouth once a day (at bedtime)  -- Indication: For Constipation    docusate sodium 100 mg oral capsule  -- 1 cap(s) by mouth 3 times a day  -- Indication: For Constipation    polyethylene glycol 3350 oral powder for reconstitution  -- 17 gram(s) by mouth 2 times a day  -- Indication: For Constipation    methocarbamol 750 mg oral tablet  -- 1 tab(s) by mouth every 6 hours, As needed, Back Spasms MDD:4  -- Indication: For muscle relaxant    ascorbic acid 500 mg oral tablet  -- 1 tab(s) by mouth 2 times a day, As needed, wound healing  -- Indication: For Supplement

## 2019-01-28 NOTE — DISCHARGE NOTE ADULT - CARE PROVIDERS DIRECT ADDRESSES
[email or web address]

## 2019-01-28 NOTE — DIETITIAN INITIAL EVALUATION ADULT. - PHYSICAL APPEARANCE
obese/well nourished/BMI 37.2, Pt visually appears well nourished with no signs of muscle wasting or fat depletion.

## 2019-01-28 NOTE — DISCHARGE NOTE ADULT - INSTRUCTIONS
CCD diet.   NO heavy lifting, strenous activity, twisting, bending, driving, or working until cleared by your physician.  Please return to the emergency department if you develop changes in mental status, seizures, fainting, dizziness, changes in vision, lethargy, nausea, vomiting, chest pain, shortness of breathe or severe pain.

## 2019-01-28 NOTE — DISCHARGE NOTE ADULT - CARE PLAN
Principal Discharge DX:	Disc displacement, lumbar  Goal:	Please follow up Neurosurgeon in one week. Please call office to make appointment. Please follow up with Primary Care Physician. Please call office to make appointment.  Assessment and plan of treatment:	Please follow up Neurosurgeon in one week. Please call office to make appointment. Please follow up with Primary Care Physician. Please call office to make appointment.  Secondary Diagnosis:	T2DM (type 2 diabetes mellitus)  Assessment and plan of treatment:	Please follow up with your endocrinologist after discharge. Please call office to make appointment  Secondary Diagnosis:	Sickle cell trait  Assessment and plan of treatment:	Please make an appointment for follow up with your primary care physician after discharge. Principal Discharge DX:	Disc displacement, lumbar  Goal:	Please follow up Neurosurgeon in one week. Please call office to make appointment. Please follow up with Primary Care Physician. Please call office to make appointment.  Assessment and plan of treatment:	Please follow up Neurosurgeon in one week. Please call office to make appointment. Please follow up with Primary Care Physician. Please call office to make appointment.  Secondary Diagnosis:	T2DM (type 2 diabetes mellitus)  Assessment and plan of treatment:	Please follow up with your primary care physician/endocronologist after discharge. Please call office to make appointment  Secondary Diagnosis:	Sickle cell trait  Assessment and plan of treatment:	Please make an appointment for follow up with your primary care physician after discharge.

## 2019-01-28 NOTE — PROGRESS NOTE ADULT - NSHPATTENDINGPLANDISCUSS_GEN_ALL_CORE
Neuro Sx PA
Patient, his sister and significant other. Night neurosx resident was given final recs.
Neuro Sx PA

## 2019-01-28 NOTE — PROGRESS NOTE ADULT - PROBLEM SELECTOR PLAN 3
may be reactive, steroid induced  no focal symptom/sign of infection  monitor CBC  d/c ryan with TOV
- f/up pending blood cultures  - pt also has leukocytosis ; off steroids  - UA negative  - hold off on abx for now pending blood cx  - would check CXR
- Low grade temps but remains afebrile now  - f/u blood cultures  - off steroids  - UA, CXR and LE duplex negative  - leukocytosis stable

## 2019-01-28 NOTE — DISCHARGE NOTE ADULT - PATIENT PORTAL LINK FT
You can access the [facility] Patient Portal, offered by [facility], by registering with the following website: [email or web address]

## 2019-01-28 NOTE — PROGRESS NOTE ADULT - ASSESSMENT
39 y/o male with PMHx of sickle cell trait, possible TIA (2012- work up negative), DM2, with worsening lower back pain and radiculopathy s/p bilateral L5-S1 TLIF with pedical screw fixation with intraop CSF leak with primary repair (EBL 400cc) on 1/23. Course c/b low grade temps and episode of suspected vasovagal/orthostatic mediated syncope.

## 2019-01-28 NOTE — PROGRESS NOTE ADULT - ASSESSMENT
38 year old male with PMHx of DMII and chronic back pain s/p bilateral L5-S1 TLIF with pedical screw fixation with intraop CSF leak with primary repair currently in need of diabetes management.

## 2019-01-28 NOTE — DISCHARGE NOTE ADULT - HOSPITAL COURSE
37 y/o male with PMHx of sickle cell trait, possible TIA (2012), RBC microcytosis, chronic lower back pain c/o intermittent worsening lower back pain radiating down both legs with associated tingling in both feet. Patient reports receiving steroid injections, most recent in November with little relief. Diagnostic imaging revealed L5-S1 disc extrusion. Evaluated by Dr. Arce. Presents to PST for a scheduled L5-S1 bilateral laminotomies, possible laminectomy and excision of herniated disc on 1/15/2019. Endocrine saw the patient for diabetes management  and recommend: patient should be started on Metformin 500mg BID can titrate up to 1000mg BID if needed. His post op course c/b low grade temps and episode of suspected vasovagal/orthostatic mediated syncope. Fever workup was negative and orthostatics stable. PT saw the patient and they recommended outpatient PT. At the time of discharge patient is neurologically and hemodynamically stable and clear for discharge home.

## 2019-01-28 NOTE — PROGRESS NOTE ADULT - SUBJECTIVE AND OBJECTIVE BOX
Chief Complaint: hyperglycemia    History: 38 year old male with PMHx of sick cell trait, possible TIA(2012) and chronic lower back pain admitted on 01/08/19 for repair of disc extrusion of L5-S1. Endocrinology following for newly diagnosed DMII and steroid induced hyperglycemia.     Patient reports back pain but is actively participating in therapy. Patient noted to have a single elevated glucose of 200 last night, patient reports having had an extra snack.     MEDICATIONS  (STANDING):  dextrose 5%. 1000 milliLiter(s) (50 mL/Hr) IV Continuous <Continuous>  dextrose 50% Injectable 12.5 Gram(s) IV Push once  dextrose 50% Injectable 25 Gram(s) IV Push once  dextrose 50% Injectable 25 Gram(s) IV Push once  docusate sodium 100 milliGRAM(s) Oral three times a day  enoxaparin Injectable 40 milliGRAM(s) SubCutaneous at bedtime  gabapentin 300 milliGRAM(s) Oral every 8 hours  insulin glargine Injectable (LANTUS) 10 Unit(s) SubCutaneous at bedtime  insulin lispro (HumaLOG) corrective regimen sliding scale   SubCutaneous three times a day before meals  insulin lispro (HumaLOG) corrective regimen sliding scale   SubCutaneous at bedtime  insulin lispro Injectable (HumaLOG) 4 Unit(s) SubCutaneous three times a day before meals  polyethylene glycol 3350 17 Gram(s) Oral daily  senna 2 Tablet(s) Oral at bedtime    MEDICATIONS  (PRN):  acetaminophen   Tablet .. 650 milliGRAM(s) Oral every 6 hours PRN Temp greater or equal to 38C (100.4F), Mild Pain (1 - 3)  ascorbic acid 500 milliGRAM(s) Oral two times a day PRN wound healing  dextrose 40% Gel 15 Gram(s) Oral once PRN Blood Glucose LESS THAN 70 milliGRAM(s)/deciliter  glucagon  Injectable 1 milliGRAM(s) IntraMuscular once PRN Glucose LESS THAN 70 milligrams/deciliter  magnesium hydroxide Suspension 30 milliLiter(s) Oral every 12 hours PRN Constipation  methocarbamol 750 milliGRAM(s) Oral every 6 hours PRN Back Spasms  oxyCODONE    IR 5 milliGRAM(s) Oral every 3 hours PRN Moderate Pain (4 - 6)  oxyCODONE    IR 10 milliGRAM(s) Oral every 4 hours PRN Severe Pain (7 - 10)      Allergies    No Known Allergies    Intolerances      Review of Systems:  Constitutional: No fever  Eyes: No blurry vision  Neuro: No tremors  HEENT: No pain  Cardiovascular: No chest pain, palpitations  Respiratory: No SOB, no cough  GI: No nausea, vomiting, abdominal pain  : No dysuria  Skin: no rash,   Psych: no depression  Endocrine: no polyuria, polydipsia  Hem/lymph: no swelling  Osteoporosis: +back pain, s/p lumbar-sacral fusion    ALL OTHER SYSTEMS REVIEWED AND NEGATIVE    UNABLE TO OBTAIN    PHYSICAL EXAM:  VITALS: T(C): 36.8 (01-28-19 @ 10:48)  T(F): 98.2 (01-28-19 @ 10:48), Max: 99.2 (01-27-19 @ 16:46)  HR: 116 (01-28-19 @ 10:48) (86 - 116)  BP: 106/78 (01-28-19 @ 10:48) (106/78 - 119/72)  RR:  (18 - 18)  SpO2:  (95% - 100%)  Wt(kg): --  GENERAL: NAD, well-groomed, well-developed  EYES: No proptosis, no lid lag, anicteric  HEENT:  Atraumatic, Normocephalic, moist mucous membranes  THYROID: Normal size, no palpable nodules  RESPIRATORY: Clear to auscultation bilaterally; No rales, rhonchi, wheezing  CARDIOVASCULAR: Regular rate and rhythm; No murmurs; no peripheral edema  GI: Soft, nontender, non distended  SKIN: Dry, intact, No rashes or lesions, +acanthosis  MUSCULOSKELETAL: Full range of motion, normal strength  NEURO: extraocular movements intact, no tremor  PSYCH: Alert and oriented x 3, normal affect, normal mood      CAPILLARY BLOOD GLUCOSE      POCT Blood Glucose.: 198 mg/dL (28 Jan 2019 12:13)  POCT Blood Glucose.: 170 mg/dL (28 Jan 2019 08:00)  POCT Blood Glucose.: 200 mg/dL (27 Jan 2019 21:46)  POCT Blood Glucose.: 180 mg/dL (27 Jan 2019 18:59)      01-28    137  |  100  |  9   ----------------------------<  170<H>  4.2   |  27  |  0.85    EGFR if : 130  EGFR if non : 112    Ca    8.8      01-28            Thyroid Function Tests:      Hemoglobin A1C, Whole Blood: 8.4 % <H> [4.0 - 5.6] (01-08-19 @ 21:16) Chief Complaint: hyperglycemia    History: 38 year old male with PMHx of sick cell trait, possible TIA(2012) and chronic lower back pain admitted on 01/08/19 for repair of disc extrusion of L5-S1. Endocrinology following for newly diagnosed DMII and steroid induced hyperglycemia.     Patient reports back pain but is actively participating in therapy. Patient noted to have a single elevated glucose of 200 last night, patient reports having had an extra snack.     MEDICATIONS  (STANDING):  dextrose 5%. 1000 milliLiter(s) (50 mL/Hr) IV Continuous <Continuous>  dextrose 50% Injectable 12.5 Gram(s) IV Push once  dextrose 50% Injectable 25 Gram(s) IV Push once  dextrose 50% Injectable 25 Gram(s) IV Push once  docusate sodium 100 milliGRAM(s) Oral three times a day  enoxaparin Injectable 40 milliGRAM(s) SubCutaneous at bedtime  gabapentin 300 milliGRAM(s) Oral every 8 hours  insulin glargine Injectable (LANTUS) 10 Unit(s) SubCutaneous at bedtime  insulin lispro (HumaLOG) corrective regimen sliding scale   SubCutaneous three times a day before meals  insulin lispro (HumaLOG) corrective regimen sliding scale   SubCutaneous at bedtime  insulin lispro Injectable (HumaLOG) 4 Unit(s) SubCutaneous three times a day before meals  polyethylene glycol 3350 17 Gram(s) Oral daily  senna 2 Tablet(s) Oral at bedtime    MEDICATIONS  (PRN):  acetaminophen   Tablet .. 650 milliGRAM(s) Oral every 6 hours PRN Temp greater or equal to 38C (100.4F), Mild Pain (1 - 3)  ascorbic acid 500 milliGRAM(s) Oral two times a day PRN wound healing  dextrose 40% Gel 15 Gram(s) Oral once PRN Blood Glucose LESS THAN 70 milliGRAM(s)/deciliter  glucagon  Injectable 1 milliGRAM(s) IntraMuscular once PRN Glucose LESS THAN 70 milligrams/deciliter  magnesium hydroxide Suspension 30 milliLiter(s) Oral every 12 hours PRN Constipation  methocarbamol 750 milliGRAM(s) Oral every 6 hours PRN Back Spasms  oxyCODONE    IR 5 milliGRAM(s) Oral every 3 hours PRN Moderate Pain (4 - 6)  oxyCODONE    IR 10 milliGRAM(s) Oral every 4 hours PRN Severe Pain (7 - 10)      Allergies  No Known Allergies      PHYSICAL EXAM:  VITALS: T(C): 36.8 (01-28-19 @ 10:48)  T(F): 98.2 (01-28-19 @ 10:48), Max: 99.2 (01-27-19 @ 16:46)  HR: 116 (01-28-19 @ 10:48) (86 - 116)  BP: 106/78 (01-28-19 @ 10:48) (106/78 - 119/72)  RR:  (18 - 18)  SpO2:  (95% - 100%)  Wt(kg): --  GENERAL: NAD, well-groomed, well-developed  HEENT:  Atraumatic, Normocephalic, moist mucous membranes  RESPIRATORY: Clear to auscultation bilaterally; No rales, rhonchi, wheezing  CARDIOVASCULAR: Regular rate and rhythm; No murmurs; no peripheral edema  GI: Soft, nontender, non distended        CAPILLARY BLOOD GLUCOSE      POCT Blood Glucose.: 198 mg/dL (28 Jan 2019 12:13)  POCT Blood Glucose.: 170 mg/dL (28 Jan 2019 08:00)  POCT Blood Glucose.: 200 mg/dL (27 Jan 2019 21:46)  POCT Blood Glucose.: 180 mg/dL (27 Jan 2019 18:59)      01-28    137  |  100  |  9   ----------------------------<  170<H>  4.2   |  27  |  0.85    EGFR if : 130  EGFR if non : 112    Ca    8.8      01-28      Hemoglobin A1C, Whole Blood: 8.4 % <H> [4.0 - 5.6] (01-08-19 @ 21:16)

## 2019-01-28 NOTE — PROGRESS NOTE ADULT - ASSESSMENT
39 y/o male with PMHx of sickle cell trait, possible TIA (2012), RBC microcytosis, chronic lower back pain c/o intermittent worsening lower back pain radiating down both legs with associated tingling in both feet. Patient reports receiving steroid injections, most recent in November with little relief. Diagnostic imaging revealed L5-S1 disc extrusion. Evaluated by Dr. Arce. Presents to PST for a scheduled L5-S1 bilateral laminotomies, possible laminectomy and excision of herniated disc on 1/15/2019. (08 Jan 2019 18:44)    PROCEDURE: 1/22 S/P B/L L5-S1 TLIF w/ pedicle screw fixation; intraoperative CSF leak with primary repair  POD #6  	  PLAN:  -Neuro: continue neurotin. Oxy IR for pain control. Robaxin for muscle spasm.   -Dr. Arce requesting MRI lumbar spine for new right hip/leg pain.   -No more vasovagal episodes. Orthostatics stable    -Encouraged incentive spirometry  -Endocrine following for elevated hba1c. Follow up appreciated.  -DVT ppx: venodynes and sql  -PT: home PT when stable    Above discussed with Dr. Arce  Spectralink #[id number]      Assessment:  Please Check When Present   []  GCS  E   V  M     Heart Failure: []Acute, [] acute on chronic , []chronic  Heart Failure:  [] Diastolic (HFpEF), [] Systolic (HFrEF), []Combined (HFpEF and HFrEF), [] RHF, [] Pulm HTN, [] Other    [] ANA MARIA, [] ATN, [] AIN, [] other  [] CKD1, [] CKD2, [] CKD 3, [] CKD 4, [] CKD 5, []ESRD    Encephalopathy: [] Metabolic, [] Hepatic, [] toxic, [] Neurological, [] Other    Abnormal Nurtitional Status: [] malnurtition (see nutrition note), [ ]underweight: BMI < 19, [] morbid obesity: BMI >40, [] Cachexia    [] Sepsis  [] hypovolemic shock,[] cardiogenic shock, [] hemorrhagic shock, [] neuogenic shock  [] Acute Respiratory Failure  []Cerebral edema, [] Brain compression/ herniation,   [] Functional quadriplegia  [] Acute blood loss anemia

## 2019-01-28 NOTE — PROGRESS NOTE ADULT - ATTENDING COMMENTS
Pt seen and examined.  Felt mid back pain, but not shooting pain. Had some pain in right leg when he extended it for rehab.  Is walking cautiously with walker.  Tmax 100.1  Await cultures.  Continue Robaxin and gabapentin.  Pt will follow up on Feb 8 at 1:30 PM.

## 2019-01-29 VITALS
OXYGEN SATURATION: 99 % | TEMPERATURE: 98 F | DIASTOLIC BLOOD PRESSURE: 72 MMHG | HEART RATE: 101 BPM | SYSTOLIC BLOOD PRESSURE: 108 MMHG | RESPIRATION RATE: 18 BRPM

## 2019-01-29 LAB — GLUCOSE BLDC GLUCOMTR-MCNC: 151 MG/DL — HIGH (ref 70–99)

## 2019-01-29 PROCEDURE — 97161 PT EVAL LOW COMPLEX 20 MIN: CPT

## 2019-01-29 PROCEDURE — 86850 RBC ANTIBODY SCREEN: CPT

## 2019-01-29 PROCEDURE — 97116 GAIT TRAINING THERAPY: CPT

## 2019-01-29 PROCEDURE — 87040 BLOOD CULTURE FOR BACTERIA: CPT

## 2019-01-29 PROCEDURE — 88304 TISSUE EXAM BY PATHOLOGIST: CPT

## 2019-01-29 PROCEDURE — C1713: CPT

## 2019-01-29 PROCEDURE — 86900 BLOOD TYPING SEROLOGIC ABO: CPT

## 2019-01-29 PROCEDURE — 76000 FLUOROSCOPY <1 HR PHYS/QHP: CPT

## 2019-01-29 PROCEDURE — 80048 BASIC METABOLIC PNL TOTAL CA: CPT

## 2019-01-29 PROCEDURE — 88311 DECALCIFY TISSUE: CPT

## 2019-01-29 PROCEDURE — 71045 X-RAY EXAM CHEST 1 VIEW: CPT

## 2019-01-29 PROCEDURE — 97530 THERAPEUTIC ACTIVITIES: CPT

## 2019-01-29 PROCEDURE — 97110 THERAPEUTIC EXERCISES: CPT

## 2019-01-29 PROCEDURE — 82962 GLUCOSE BLOOD TEST: CPT

## 2019-01-29 PROCEDURE — 81003 URINALYSIS AUTO W/O SCOPE: CPT

## 2019-01-29 PROCEDURE — C1889: CPT

## 2019-01-29 PROCEDURE — 86901 BLOOD TYPING SEROLOGIC RH(D): CPT

## 2019-01-29 PROCEDURE — 85027 COMPLETE CBC AUTOMATED: CPT

## 2019-01-29 PROCEDURE — 72131 CT LUMBAR SPINE W/O DYE: CPT

## 2019-01-29 PROCEDURE — 93970 EXTREMITY STUDY: CPT

## 2019-01-29 RX ORDER — POLYETHYLENE GLYCOL 3350 17 G/17G
17 POWDER, FOR SOLUTION ORAL
Qty: 0 | Refills: 0 | Status: DISCONTINUED | OUTPATIENT
Start: 2019-01-29 | End: 2019-01-29

## 2019-01-29 RX ORDER — POLYETHYLENE GLYCOL 3350 17 G/17G
17 POWDER, FOR SOLUTION ORAL
Qty: 0 | Refills: 0 | DISCHARGE
Start: 2019-01-29

## 2019-01-29 RX ORDER — OXYCODONE HYDROCHLORIDE 5 MG/1
1 TABLET ORAL
Qty: 30 | Refills: 0
Start: 2019-01-29

## 2019-01-29 RX ORDER — METHOCARBAMOL 500 MG/1
1 TABLET, FILM COATED ORAL
Qty: 30 | Refills: 0
Start: 2019-01-29

## 2019-01-29 RX ORDER — ASCORBIC ACID 60 MG
1 TABLET,CHEWABLE ORAL
Qty: 0 | Refills: 0 | DISCHARGE
Start: 2019-01-29

## 2019-01-29 RX ORDER — METFORMIN HYDROCHLORIDE 850 MG/1
1 TABLET ORAL
Qty: 60 | Refills: 0
Start: 2019-01-29

## 2019-01-29 RX ORDER — SENNA PLUS 8.6 MG/1
2 TABLET ORAL
Qty: 0 | Refills: 0 | DISCHARGE
Start: 2019-01-29

## 2019-01-29 RX ORDER — GABAPENTIN 400 MG/1
1 CAPSULE ORAL
Qty: 0 | Refills: 0 | COMMUNITY

## 2019-01-29 RX ORDER — GABAPENTIN 400 MG/1
1 CAPSULE ORAL
Qty: 30 | Refills: 0
Start: 2019-01-29

## 2019-01-29 RX ORDER — IBUPROFEN 200 MG
1 TABLET ORAL
Qty: 0 | Refills: 0 | COMMUNITY

## 2019-01-29 RX ORDER — DOCUSATE SODIUM 100 MG
1 CAPSULE ORAL
Qty: 0 | Refills: 0 | DISCHARGE
Start: 2019-01-29

## 2019-01-29 RX ORDER — METFORMIN HYDROCHLORIDE 850 MG/1
1 TABLET ORAL
Qty: 0 | Refills: 0 | COMMUNITY

## 2019-01-29 RX ADMIN — OXYCODONE HYDROCHLORIDE 10 MILLIGRAM(S): 5 TABLET ORAL at 02:36

## 2019-01-29 RX ADMIN — OXYCODONE HYDROCHLORIDE 10 MILLIGRAM(S): 5 TABLET ORAL at 11:00

## 2019-01-29 RX ADMIN — GABAPENTIN 300 MILLIGRAM(S): 400 CAPSULE ORAL at 05:25

## 2019-01-29 RX ADMIN — Medication 4 UNIT(S): at 08:13

## 2019-01-29 RX ADMIN — METHOCARBAMOL 750 MILLIGRAM(S): 500 TABLET, FILM COATED ORAL at 12:20

## 2019-01-29 RX ADMIN — OXYCODONE HYDROCHLORIDE 10 MILLIGRAM(S): 5 TABLET ORAL at 06:38

## 2019-01-29 RX ADMIN — OXYCODONE HYDROCHLORIDE 10 MILLIGRAM(S): 5 TABLET ORAL at 03:00

## 2019-01-29 RX ADMIN — Medication 100 MILLIGRAM(S): at 05:25

## 2019-01-29 RX ADMIN — Medication 1: at 08:14

## 2019-01-29 NOTE — PROGRESS NOTE ADULT - ASSESSMENT
37 y/o male with PMHx of sickle cell trait, possible TIA (2012), RBC microcytosis, chronic lower back pain c/o intermittent worsening lower back pain radiating down both legs with associated tingling in both feet. Patient reports receiving steroid injections, most recent in November with little relief. Diagnostic imaging revealed L5-S1 disc extrusion. Evaluated by Dr. Arce. Presents to PST for a scheduled L5-S1 bilateral laminotomies, possible laminectomy and excision of herniated disc on 1/15/2019. (08 Jan 2019 18:44)    PROCEDURE: 1/22 S/P B/L L5-S1 TLIF w/ pedicle screw fixation; intraoperative CSF leak with primary repair  POD #7  	  PLAN:  -Neuro: continue neurotin. Oxy IR for pain control. Robaxin for muscle spasm.   -No more vasovagal episodes. Orthostatics stable    -Encouraged incentive spirometry  -Endocrine following for elevated hba1c. Discharge on: Metformin 500mg po bid with a meter/strips/lancets to test once daily.  Meds sent down to [facility] and patient instructed to follow up with her pcp/endocronologist after discharge.   -DVT ppx: venodynes and sql  -PT: home PT. Home today as discussed with Dr. Arce.     Above discussed with Dr. Arce  Spectralink #[id number]      Assessment:  Please Check When Present   []  GCS  E   V  M     Heart Failure: []Acute, [] acute on chronic , []chronic  Heart Failure:  [] Diastolic (HFpEF), [] Systolic (HFrEF), []Combined (HFpEF and HFrEF), [] RHF, [] Pulm HTN, [] Other    [] ANA MARIA, [] ATN, [] AIN, [] other  [] CKD1, [] CKD2, [] CKD 3, [] CKD 4, [] CKD 5, []ESRD    Encephalopathy: [] Metabolic, [] Hepatic, [] toxic, [] Neurological, [] Other    Abnormal Nurtitional Status: [] malnurtition (see nutrition note), [ ]underweight: BMI < 19, [] morbid obesity: BMI >40, [] Cachexia    [] Sepsis  [] hypovolemic shock,[] cardiogenic shock, [] hemorrhagic shock, [] neuogenic shock  [] Acute Respiratory Failure  []Cerebral edema, [] Brain compression/ herniation,   [] Functional quadriplegia  [] Acute blood loss anemia

## 2019-01-29 NOTE — PROGRESS NOTE ADULT - PROVIDER SPECIALTY LIST ADULT
Anesthesia
Endocrinology
Hospitalist
Neurosurgery
Internal Medicine
Internal Medicine

## 2019-01-29 NOTE — PROGRESS NOTE ADULT - ATTENDING COMMENTS
As per PA note.  No specific new complaints.  ambulating with walker, Wound intact and dry with no collections.  Discharge today with office follow up.

## 2019-01-29 NOTE — PROGRESS NOTE ADULT - SUBJECTIVE AND OBJECTIVE BOX
SUBJECTIVE: Patient seen and examined at bedside. Notes improvement in his right leg/back pain.     OVERNIGHT EVENTS: none    Vital Signs Last 24 Hrs  T(C): 37.1 (29 Jan 2019 04:24), Max: 37.6 (29 Jan 2019 00:12)  T(F): 98.8 (29 Jan 2019 04:24), Max: 99.6 (29 Jan 2019 00:12)  HR: 79 (29 Jan 2019 04:24) (79 - 116)  BP: 105/69 (29 Jan 2019 04:24) (100/64 - 118/74)  BP(mean): --  RR: 18 (29 Jan 2019 04:24) (18 - 18)  SpO2: 96% (29 Jan 2019 04:24) (95% - 100%)      PHYSICAL EXAM:    Constitutional: No Acute Distress     Neurological: AOx3, Following Commands, Moving all Extremities              Motor exam:          Upper extremity                         Delt     Bicep     Tricep    HG                                                 R         5/5        5/5        5/5       5/5                                               L          5/5        5/5        5/5       5/5          Lower extremity                        HF         KF        KE       DF         PF                                                  R        4/5        5/5        5/5       5/5         5/5                                               L         4/5        5/5       5/5       5/5          5/5                                                 Sensation: [X] intact to light touch  [] decreased:     Pulmonary: Clear to Auscultation, No rales, No rhonchi, No wheezes     Cardiovascular: S1, S2, Regular rate and rhythm     Gastrointestinal: Soft, Non-tender, Non-distended     Extremities: No calf tenderness     Incision: c/d/i     LABS: no new labs       DIET: CCD diet     IMAGING: no new imaging

## 2019-01-30 ENCOUNTER — INBOUND DOCUMENT (OUTPATIENT)
Age: 39
End: 2019-01-30

## 2019-02-01 LAB
CULTURE RESULTS: SIGNIFICANT CHANGE UP
CULTURE RESULTS: SIGNIFICANT CHANGE UP
SPECIMEN SOURCE: SIGNIFICANT CHANGE UP
SPECIMEN SOURCE: SIGNIFICANT CHANGE UP

## 2019-02-05 ENCOUNTER — TRANSCRIPTION ENCOUNTER (OUTPATIENT)
Age: 39
End: 2019-02-05

## 2019-02-07 ENCOUNTER — RECORD ABSTRACTING (OUTPATIENT)
Age: 39
End: 2019-02-07

## 2019-02-07 DIAGNOSIS — K59.03 DRUG INDUCED CONSTIPATION: ICD-10-CM

## 2019-02-07 DIAGNOSIS — T40.2X5A DRUG INDUCED CONSTIPATION: ICD-10-CM

## 2019-02-08 ENCOUNTER — APPOINTMENT (OUTPATIENT)
Dept: NEUROSURGERY | Facility: CLINIC | Age: 39
End: 2019-02-08
Payer: COMMERCIAL

## 2019-02-08 VITALS
HEART RATE: 79 BPM | TEMPERATURE: 98.4 F | RESPIRATION RATE: 18 BRPM | SYSTOLIC BLOOD PRESSURE: 110 MMHG | DIASTOLIC BLOOD PRESSURE: 78 MMHG | OXYGEN SATURATION: 97 % | BODY MASS INDEX: 34.79 KG/M2 | WEIGHT: 243 LBS | HEIGHT: 70 IN

## 2019-02-08 PROCEDURE — 99024 POSTOP FOLLOW-UP VISIT: CPT

## 2019-02-08 RX ORDER — POLYETHYLENE GLYCOL 3350 17 G/17G
17 POWDER, FOR SOLUTION ORAL DAILY
Refills: 0 | Status: DISCONTINUED | COMMUNITY
Start: 2019-02-07 | End: 2019-02-08

## 2019-02-08 RX ORDER — SENNOSIDES 8.6 MG TABLETS 8.6 MG/1
8.6 TABLET ORAL
Refills: 0 | Status: DISCONTINUED | COMMUNITY
Start: 2019-02-07 | End: 2019-02-08

## 2019-02-15 NOTE — ASSESSMENT
[FreeTextEntry1] : Mr. Corral comes to the office reporting significant improvement in his pre-operative symptoms. He is having post-operative muscular pain.  Will send for outpatient physical therapy.  Will transition to ES Tylenol and use the Oxycodone for more severe pain.  Will use the Robaxin as well.  No creams or ointments on incision and should not immerse the incision in water for another 2 or 3 weeks.  He will follow-up in the office in 4 weeks for further clinical evaluation.  Will call for any problems or concerns. \par

## 2019-02-15 NOTE — HISTORY OF PRESENT ILLNESS
[FreeTextEntry1] : SERENITY SU is a 38 year old male here on 02/07/2019, 4 weeks after he was last seen and 17 days after undergoing bilateral L5-S1 TLIF's with PLIF with pedicle screw fixation; intraoperative CSF leak with primary repair on 1/22/2019.  He comes to the office today reporting significant improvement in his symptoms.   He has a different pain now, operative pain. If he turns left or right, he has pain in the center of his pain.  His right 5th toe will get numb at times.  He has some pain in the left hip at times.  No headaches.  He is taking Oxycodone 5 mg every 4-6 hours.  He tried to only take it once but he was very uncomfortable.  He is taking Robaxin 750 2 or 3 times a day. He completed the antibiotic for hematuria and no longer has blood in the urine.  No fever.  No urinary burning. He is not doing therapy.  He is using a cane to ambulate.  Denies bowel or bladder problems.  \par

## 2019-02-15 NOTE — PHYSICAL EXAM
[FreeTextEntry1] : Awake, alert, and oriented x 3.  Speech is clear and appropriate.  Affect is normal.  Voice is strong.  No respiratory distress and normal respiratory rhythm and effort.  Normal skin color and pigmentation.  The sclera and conjunctiva normal.  Ears, nose, and neck normal in appearance.   Right hand dominant.  Rises from a seated position in a slow, cautious and mildly uncomfortable fashion.  Gait is slow, alternating, well coordinated, and stable with the use of a cane. Range of motion of lumbar spine deferred.  No pain to palpation in the lumbar spine or paraspinal regions.  Motor strength in the lower extremities is 5/5 in the iliopsoas, gluteus medius, quadriceps, hamstrings, dorsiflexors, plantar flexors, and EHL.  Incision healing well with sutures. Well approximated without redness or drainage. Mild edema. \par

## 2019-02-15 NOTE — PROCEDURE
[FreeTextEntry1] : Sutures removed from incision without difficulty and without complaints.  Absorbable suture knot clipped from former drain site.  Incision healing well with dry skin.

## 2019-02-15 NOTE — REASON FOR VISIT
[de-identified] : bilateral L5-S1 TLIF's with PLIF with pedicle screw fixation; intraoperative CSF leak with primary repair [de-identified] : /22/2019 [de-identified] : 17 [de-identified] : 3 [de-identified] : Pathology: degenerative fibrocartilage c/w herniated disc; fragments of bone and fibrous tissue.

## 2019-03-05 ENCOUNTER — TRANSCRIPTION ENCOUNTER (OUTPATIENT)
Age: 39
End: 2019-03-05

## 2019-03-08 ENCOUNTER — APPOINTMENT (OUTPATIENT)
Dept: NEUROSURGERY | Facility: CLINIC | Age: 39
End: 2019-03-08
Payer: COMMERCIAL

## 2019-03-08 VITALS
BODY MASS INDEX: 33.21 KG/M2 | RESPIRATION RATE: 18 BRPM | DIASTOLIC BLOOD PRESSURE: 74 MMHG | HEART RATE: 91 BPM | WEIGHT: 232 LBS | HEIGHT: 70 IN | OXYGEN SATURATION: 97 % | SYSTOLIC BLOOD PRESSURE: 109 MMHG | TEMPERATURE: 98.6 F

## 2019-03-08 PROCEDURE — 99024 POSTOP FOLLOW-UP VISIT: CPT

## 2019-03-08 RX ORDER — DOCUSATE SODIUM 100 MG/1
100 CAPSULE ORAL 3 TIMES DAILY
Refills: 0 | Status: DISCONTINUED | COMMUNITY
Start: 2019-02-07 | End: 2019-03-08

## 2019-03-08 RX ORDER — SULFAMETHOXAZOLE AND TRIMETHOPRIM 800; 160 MG/1; MG/1
800-160 TABLET ORAL TWICE DAILY
Qty: 6 | Refills: 0 | Status: DISCONTINUED | COMMUNITY
Start: 2019-02-01 | End: 2019-03-08

## 2019-03-08 NOTE — REVIEW OF SYSTEMS
[Negative] : Heme/Lymph [Recent Weight Loss (___ Lbs)] : recent [unfilled] ~Ulb weight loss [Arthralgias] : arthralgias [Joint Pain] : joint pain

## 2019-03-08 NOTE — PHYSICAL EXAM
[FreeTextEntry1] : Awake, alert, and oriented x 3.  Speech is clear and appropriate.  Affect is normal.  Voice is strong.  No respiratory distress and normal respiratory rhythm and effort.  Normal skin color and pigmentation.  The sclera and conjunctiva normal.  Ears, nose, and neck normal in appearance.   Right hand dominant.  Rises from a seated position in a slow, cautious and mildly uncomfortable fashion.  Gait is slow, alternating, well coordinated, and stable with and without the use of a cane. Gentle range of motion of lumbar spine decreased with minimal pain.  No pain to palpation in the lumbar spine or paraspinal regions.  Motor strength in the lower extremities is 5/5 in the iliopsoas, gluteus medius, quadriceps, hamstrings, dorsiflexors, plantar flexors, and EHL.  Incision well healed without redness, edema, or drainage.  \par

## 2019-03-08 NOTE — HISTORY OF PRESENT ILLNESS
[FreeTextEntry1] : SERENITY SU is a 38 year old male here on 03/08/2019, 4 weeks after he was last seen and 6.5 weeks after undergoing bilateral L5-S1 TLIF's with PLIF with pedicle screw fixation; intraoperative CSF leak with primary repair on 1/22/2019.  He comes to the office today reporting at least an 80% improvement. He no longer has the low back pain that he had prior to surgery but does have surgical discomfort. No leg pain. He continues with left hip pain/buttocks pain.  No fever, chills, numbness, tingling, or bowel or bladder problems.  Sometimes has pain in the right hip after PT.  He is taking Tylenol ES once a day for pain. He will take an Oxycodone at times after PT.  He stopped the muscle relaxers. He is going to PT 2-3 times a week.  He feels it is helpful though he does have discomfort afterwards.  Non-smoker.  He is not back to work. He works in IT-installing servers. Requires a lot of lifting or sitting.

## 2019-03-08 NOTE — REASON FOR VISIT
[Spouse] : spouse [de-identified] : L5 laminotomy. bilateral L5-S1 facetetomies and TLIF's with posteriolateral instrumentation and fusion with pedicle screw fixation; intraoperative CSF leak with primary repair [de-identified] : 1/22/2019 [de-identified] : 9 [de-identified] : Pathology: degenerative fibrocartilage c/w herniated disc; fragments of bone and fibrous tissue.

## 2019-03-08 NOTE — ASSESSMENT
[FreeTextEntry1] : Mr. Corral returns to the office 6.5 weeks after lumbar fusion reporting feeling at least 80% improved. He continues with left hip discomfort which was present prior to surgery and is better but hasn't improved since he was here last. Continue with PT, new Rx given.  Progressive increase in activity with limited forward flexion.  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  To remain off of work for at least another 6 weeks. Will send paperwork to complete.  He will follow-up in the office in 6 weeks for further clinical and radiographic evaluation with lumbar AP and lateral x-rays.\par

## 2019-03-13 ENCOUNTER — APPOINTMENT (OUTPATIENT)
Dept: INTERNAL MEDICINE | Facility: CLINIC | Age: 39
End: 2019-03-13
Payer: COMMERCIAL

## 2019-03-13 VITALS
SYSTOLIC BLOOD PRESSURE: 109 MMHG | DIASTOLIC BLOOD PRESSURE: 77 MMHG | HEIGHT: 70 IN | HEART RATE: 88 BPM | RESPIRATION RATE: 17 BRPM | TEMPERATURE: 97.7 F | WEIGHT: 229 LBS | BODY MASS INDEX: 32.78 KG/M2 | OXYGEN SATURATION: 97 %

## 2019-03-13 DIAGNOSIS — Z78.9 OTHER SPECIFIED HEALTH STATUS: ICD-10-CM

## 2019-03-13 PROCEDURE — 99204 OFFICE O/P NEW MOD 45 MIN: CPT | Mod: 25

## 2019-03-13 PROCEDURE — 99395 PREV VISIT EST AGE 18-39: CPT

## 2019-03-13 PROCEDURE — 99385 PREV VISIT NEW AGE 18-39: CPT

## 2019-03-13 RX ORDER — OXYCODONE 5 MG/1
5 TABLET ORAL EVERY 8 HOURS
Qty: 30 | Refills: 0 | Status: DISCONTINUED | COMMUNITY
Start: 2019-02-06 | End: 2019-03-13

## 2019-03-13 RX ORDER — METHOCARBAMOL 500 MG/1
500 TABLET, FILM COATED ORAL
Qty: 120 | Refills: 0 | Status: DISCONTINUED | COMMUNITY
Start: 2019-02-06 | End: 2019-03-13

## 2019-03-13 RX ORDER — METFORMIN ER 500 MG 500 MG/1
500 TABLET ORAL
Refills: 0 | Status: DISCONTINUED | COMMUNITY

## 2019-03-13 NOTE — COUNSELING
[Good understanding] : Patient has a good understanding of lifestyle changes and the steps needed to achieve self management goals [Weight management counseling provided] : Weight management [Healthy eating counseling provided] : healthy eating [Activity counseling provided] : activity [Decrease Portions] : Decrease food portions [Walking] : Walking [None] : None [de-identified] : ADA diet

## 2019-03-13 NOTE — HISTORY OF PRESENT ILLNESS
[FreeTextEntry1] : establish care  [de-identified] : 39 yo male presented to clinic to establish care. He had recently had L5-S1 laminectomy/ fusion surgery for herniated disc January 22/19 and was found Hb A1c: 8.5%,he was started on Metformin 1 g qd.\par pt was Rxed with multiple steroid injections to the spine for low back pain since 2014,last in Nov.2018.  Denies polyphagia, polydipsia, polyuria.pt reports intentional weight loss of around 15-20 pounds since January to now with diet change.   Patient is on Metformin  mg BID. \par the spine surgery was effective,pt is on PT now,not using Oxycodone,muscle relaxants.\par PMH:leukocytosis on repeat labs,steroid induced?BM Bx was normal\par  He was told of  mildly enlarged spleen in 2012 on US,incidental finding.\par also had an episode of ?micturition syncope after spine surgery.had unremarkable MRI for w/u of migraine CABRERA's.\par ? TIA ,2012(numbness L.hand,LLE)\par also h/o  Sickle cell trait,poss.thalassemia trait.

## 2019-03-13 NOTE — REVIEW OF SYSTEMS
[Back Pain] : back pain [Fever] : no fever [Chills] : no chills [Fatigue] : no fatigue [Hot Flashes] : no hot flashes [Discharge] : no discharge [Pain] : no pain [Redness] : no redness [Dryness] : no dryness [Earache] : no earache [Hearing Loss] : no hearing loss [Nosebleeds] : no nosebleeds [Chest Pain] : no chest pain [Palpitations] : no palpitations [Shortness Of Breath] : no shortness of breath [Wheezing] : no wheezing [Cough] : no cough [Abdominal Pain] : no abdominal pain [Nausea] : no nausea [Constipation] : no constipation [Vomiting] : no vomiting [Heartburn] : no heartburn [Dysuria] : no dysuria [Incontinence] : no incontinence [Hematuria] : no hematuria [Frequency] : no frequency [Joint Pain] : no joint pain [Joint Stiffness] : no joint stiffness [Joint Swelling] : no joint swelling [Itching] : no itching

## 2019-03-13 NOTE — HEALTH RISK ASSESSMENT
[No falls in past year] : Patient reported no falls in the past year [Patient reported colonoscopy was normal] : Patient reported colonoscopy was normal [HIV Test offered] : HIV Test offered [Hepatitis C test offered] : Hepatitis C test offered [Employed] : employed [On disability] : on disability [Fully functional (bathing, dressing, toileting, transferring, walking, feeding)] : Fully functional (bathing, dressing, toileting, transferring, walking, feeding) [Fully functional (using the telephone, shopping, preparing meals, housekeeping, doing laundry, using] : Fully functional and needs no help or supervision to perform IADLs (using the telephone, shopping, preparing meals, housekeeping, doing laundry, using transportation, managing medications and managing finances) [Designated Healthcare Proxy] : Designated healthcare proxy [Name: ___] : Health Care Proxy's Name: [unfilled]  [Relationship: ___] : Relationship: [unfilled] [] : No [Change in mental status noted] : No change in mental status noted [ColonoscopyComments] : 2012, it was normal

## 2019-03-13 NOTE — PHYSICAL EXAM
[No Acute Distress] : no acute distress [Well Developed] : well developed [Well-Appearing] : well-appearing [Normal Sclera/Conjunctiva] : normal sclera/conjunctiva [PERRL] : pupils equal round and reactive to light [EOMI] : extraocular movements intact [Normal Oropharynx] : the oropharynx was normal [Normal Nasal Mucosa] : the nasal mucosa was normal [No JVD] : no jugular venous distention [Supple] : supple [Thyroid Normal, No Nodules] : the thyroid was normal and there were no nodules present [No Respiratory Distress] : no respiratory distress  [Clear to Auscultation] : lungs were clear to auscultation bilaterally [No Accessory Muscle Use] : no accessory muscle use [Normal Rate] : normal rate  [Normal S1, S2] : normal S1 and S2 [No Carotid Bruits] : no carotid bruits [No Edema] : there was no peripheral edema [Soft] : abdomen soft [Non Tender] : non-tender [Non-distended] : non-distended [No HSM] : no HSM [Normal Bowel Sounds] : normal bowel sounds [Normal Supraclavicular Nodes] : no supraclavicular lymphadenopathy [Normal Axillary Nodes] : no axillary lymphadenopathy [Normal Posterior Cervical Nodes] : no posterior cervical lymphadenopathy [Normal Anterior Cervical Nodes] : no anterior cervical lymphadenopathy [No CVA Tenderness] : no CVA  tenderness [Normal Gait] : normal gait [Normal Insight/Judgement] : insight and judgment were intact [Comprehensive Foot Exam Normal] : Right and left foot were examined and both feet are normal. No ulcers in either foot. Toes are normal and with full ROM.  Normal tactile sensation with monofilament testing throughout both feet [Kyphosis] : no kyphosis [Scoliosis] : no scoliosis [de-identified] : obese [de-identified] : no splenomegaly [de-identified] : lumbar surg.scar [de-identified] : Acanthosis,acne.striae,white to purple [de-identified] : walks with a cane

## 2019-03-13 NOTE — ASSESSMENT
[FreeTextEntry1] : 37 yo with recent onset of T2D,on Metformin 1 g qd.\par PE remarkable for acanthosis,acne,striae.will ask for dexamethasone suppr,test.ophth eval.\par labs taken,instruction given.f/u 3 months.

## 2019-03-18 LAB
25(OH)D3 SERPL-MCNC: 15.4 NG/ML
ALBUMIN SERPL ELPH-MCNC: 4.7 G/DL
ALP BLD-CCNC: 79 U/L
ALT SERPL-CCNC: 58 U/L
ANION GAP SERPL CALC-SCNC: 12 MMOL/L
APPEARANCE: CLEAR
AST SERPL-CCNC: 37 U/L
BASOPHILS # BLD AUTO: 0.1 K/UL
BASOPHILS NFR BLD AUTO: 0.7 %
BILIRUB SERPL-MCNC: 0.4 MG/DL
BILIRUBIN URINE: NEGATIVE
BLOOD URINE: NEGATIVE
BUN SERPL-MCNC: 7 MG/DL
CALCIUM SERPL-MCNC: 10.1 MG/DL
CHLORIDE SERPL-SCNC: 101 MMOL/L
CHOLEST SERPL-MCNC: 200 MG/DL
CHOLEST/HDLC SERPL: 5.1 RATIO
CO2 SERPL-SCNC: 27 MMOL/L
COLOR: YELLOW
CORTIS SERPL-MCNC: 6.5 UG/DL
CREAT SERPL-MCNC: 0.99 MG/DL
CREAT SPEC-SCNC: 331 MG/DL
EOSINOPHIL # BLD AUTO: 0.41 K/UL
EOSINOPHIL NFR BLD AUTO: 2.9 %
GLUCOSE QUALITATIVE U: NEGATIVE
GLUCOSE SERPL-MCNC: 111 MG/DL
HBA1C MFR BLD HPLC: 6.3 %
HCT VFR BLD CALC: 46.5 %
HDLC SERPL-MCNC: 39 MG/DL
HGB BLD-MCNC: 15.1 G/DL
IMM GRANULOCYTES NFR BLD AUTO: 0.4 %
KETONES URINE: NEGATIVE
LDLC SERPL CALC-MCNC: 112 MG/DL
LEUKOCYTE ESTERASE URINE: NEGATIVE
LYMPHOCYTES # BLD AUTO: 3.86 K/UL
LYMPHOCYTES NFR BLD AUTO: 27.1 %
MAN DIFF?: NORMAL
MCHC RBC-ENTMCNC: 25.9 PG
MCHC RBC-ENTMCNC: 32.5 GM/DL
MCV RBC AUTO: 79.6 FL
MICROALBUMIN 24H UR DL<=1MG/L-MCNC: 1.8 MG/DL
MICROALBUMIN/CREAT 24H UR-RTO: 5 MG/G
MONOCYTES # BLD AUTO: 0.81 K/UL
MONOCYTES NFR BLD AUTO: 5.7 %
NEUTROPHILS # BLD AUTO: 8.98 K/UL
NEUTROPHILS NFR BLD AUTO: 63.2 %
NITRITE URINE: NEGATIVE
PH URINE: 6
PLATELET # BLD AUTO: 407 K/UL
POTASSIUM SERPL-SCNC: 4.8 MMOL/L
PROT SERPL-MCNC: 7.2 G/DL
PROTEIN URINE: NORMAL
RBC # BLD: 5.84 M/UL
RBC # FLD: 14.7 %
SODIUM SERPL-SCNC: 140 MMOL/L
SPECIFIC GRAVITY URINE: 1.02
TRIGL SERPL-MCNC: 243 MG/DL
TSH SERPL-ACNC: 2.36 UIU/ML
UROBILINOGEN URINE: NORMAL
WBC # FLD AUTO: 14.22 K/UL

## 2019-03-25 RX ORDER — METFORMIN HYDROCHLORIDE 500 MG/1
500 TABLET, COATED ORAL DAILY
Qty: 90 | Refills: 3 | Status: DISCONTINUED | COMMUNITY
End: 2019-03-25

## 2019-04-19 ENCOUNTER — OUTPATIENT (OUTPATIENT)
Dept: OUTPATIENT SERVICES | Facility: HOSPITAL | Age: 39
LOS: 1 days | End: 2019-04-19
Payer: COMMERCIAL

## 2019-04-19 ENCOUNTER — APPOINTMENT (OUTPATIENT)
Dept: NEUROSURGERY | Facility: CLINIC | Age: 39
End: 2019-04-19
Payer: SELF-PAY

## 2019-04-19 VITALS
BODY MASS INDEX: 32.78 KG/M2 | WEIGHT: 229 LBS | HEIGHT: 70 IN | SYSTOLIC BLOOD PRESSURE: 110 MMHG | OXYGEN SATURATION: 97 % | DIASTOLIC BLOOD PRESSURE: 77 MMHG | TEMPERATURE: 98 F | RESPIRATION RATE: 17 BRPM | HEART RATE: 96 BPM

## 2019-04-19 DIAGNOSIS — Z98.890 OTHER SPECIFIED POSTPROCEDURAL STATES: Chronic | ICD-10-CM

## 2019-04-19 DIAGNOSIS — M48.07 SPINAL STENOSIS, LUMBOSACRAL REGION: ICD-10-CM

## 2019-04-19 DIAGNOSIS — L72.9 FOLLICULAR CYST OF THE SKIN AND SUBCUTANEOUS TISSUE, UNSPECIFIED: Chronic | ICD-10-CM

## 2019-04-19 PROCEDURE — 72100 X-RAY EXAM L-S SPINE 2/3 VWS: CPT | Mod: 26

## 2019-04-19 PROCEDURE — 99024 POSTOP FOLLOW-UP VISIT: CPT

## 2019-04-19 PROCEDURE — 72100 X-RAY EXAM L-S SPINE 2/3 VWS: CPT

## 2019-04-19 NOTE — PHYSICAL EXAM
[FreeTextEntry1] : PT is alert, awake in NAD.  Wound ds healing well. No spinal tenderness or spasm.  Gait is symmetrical.\par Motor is 5/5 in LE.

## 2019-04-19 NOTE — HISTORY OF PRESENT ILLNESS
[FreeTextEntry1] : Braydon Corral is a 38yr old male here for his second post op visit s/p bilateral L5-S1 TLIF's with PLIF with pedicle screw fixation; intraoperative CSF leak with primary repair on 1/22/2019. Since his last post op visit he has been feeling a lot better.  More recently feels where the hardware is.  Like an ache, not sharp pain, that is not comfortable.  Left hp pain is still there. At physical therapy and after stretching, the hip pain was gone.  Pt got X-Ray today.  Walking is good, uses cane only when outside of house for security.  Worked from home on Monday and tries to avoid sitting too long, every hour gets up and walks around.  Last week did a lot of things int he house and then the back hurt, and took Tylenol and muscle relaxant and got better the next day.  Does not ordinarily take Tylenol for the discomfort.  Preop pain is gone since the day after surgery.

## 2019-04-19 NOTE — ASSESSMENT
[FreeTextEntry1] : Pt is doing well nearly 3 months after L5S1 fusion with resolution of postoperative pain.  He can continue with PT and do the stretching that helped his hip pain.  He can use treadmill at home as tolerated.  Return in 3 months.\par Will order bone growth stimulator.

## 2019-04-19 NOTE — REASON FOR VISIT
[de-identified] : [facility] is status post L5 laminotomy. bilateral L5-S1 facetetomies and TLIF's with posterolateral instrumentation and fusion with pedicle screw fixation; intraoperative CSF leak with primary repair and he is here for a post-op visit. \par Surgery Date: 1/22/2019 Post-Op Week # 7 Pathology: degenerative fibrocartilage c/w herniated disc; fragments of bone and fibrous tissue.

## 2019-04-19 NOTE — DATA REVIEWED
[de-identified] : LS spine X-Ray today shows good instrumentation and alignment, no gross loosening of screws.

## 2019-06-17 NOTE — PROGRESS NOTE ADULT - SUBJECTIVE AND OBJECTIVE BOX
Speech Language Pathology      Department of Speech/Language Pathology   Re: William Johnson for Michael Hess  Swallow screening noted per pneumonia pathway. Chart review completed, pt not seen by speech therapy. Pt seen recently for MBSS with no aspiration or rx for treatment. Puree/Soft solids and Thins recommended. Please order swallowing evaluation with speech therapy if there are concerns for oropharyngeal dysphagia.    Marcello Linares,   6/17/2019  8:30 AM Patient is a 38y old  Male who presents with a chief complaint of back pain (27 Jan 2019 11:14)      SUBJECTIVE / OVERNIGHT EVENTS: Patient reportedly has episode of syncope yesterday while in the bathroom. He had severe back pain and took pain medication prior to going to the bathroom. No BM since last Tuesday. No difficulty with urination. No ab pain, cough, SOB.    MEDICATIONS  (STANDING):  dextrose 5%. 1000 milliLiter(s) (50 mL/Hr) IV Continuous <Continuous>  dextrose 50% Injectable 12.5 Gram(s) IV Push once  dextrose 50% Injectable 25 Gram(s) IV Push once  dextrose 50% Injectable 25 Gram(s) IV Push once  docusate sodium 100 milliGRAM(s) Oral three times a day  enoxaparin Injectable 40 milliGRAM(s) SubCutaneous at bedtime  gabapentin 300 milliGRAM(s) Oral every 8 hours  insulin glargine Injectable (LANTUS) 10 Unit(s) SubCutaneous at bedtime  insulin lispro (HumaLOG) corrective regimen sliding scale   SubCutaneous three times a day before meals  insulin lispro (HumaLOG) corrective regimen sliding scale   SubCutaneous at bedtime  insulin lispro Injectable (HumaLOG) 4 Unit(s) SubCutaneous three times a day before meals  polyethylene glycol 3350 17 Gram(s) Oral daily  senna 2 Tablet(s) Oral at bedtime    MEDICATIONS  (PRN):  acetaminophen   Tablet .. 650 milliGRAM(s) Oral every 6 hours PRN Temp greater or equal to 38C (100.4F), Mild Pain (1 - 3)  ascorbic acid 500 milliGRAM(s) Oral two times a day PRN wound healing  dextrose 40% Gel 15 Gram(s) Oral once PRN Blood Glucose LESS THAN 70 milliGRAM(s)/deciliter  glucagon  Injectable 1 milliGRAM(s) IntraMuscular once PRN Glucose LESS THAN 70 milligrams/deciliter  magnesium hydroxide Suspension 30 milliLiter(s) Oral every 12 hours PRN Constipation  methocarbamol 750 milliGRAM(s) Oral every 6 hours PRN Back Spasms  oxyCODONE    IR 5 milliGRAM(s) Oral every 3 hours PRN Moderate Pain (4 - 6)  oxyCODONE    IR 10 milliGRAM(s) Oral every 4 hours PRN Severe Pain (7 - 10)      Vital Signs Last 24 Hrs  T(C): 36.9 (28 Jan 2019 01:59), Max: 37.3 (27 Jan 2019 16:46)  T(F): 98.4 (28 Jan 2019 01:59), Max: 99.2 (27 Jan 2019 16:46)  HR: 116 (28 Jan 2019 10:48) (86 - 116)  BP: 106/78 (28 Jan 2019 10:48) (106/78 - 119/72)  BP(mean): --  RR: 18 (28 Jan 2019 01:59) (18 - 18)  SpO2: 98% (28 Jan 2019 01:59) (97% - 100%)  CAPILLARY BLOOD GLUCOSE      POCT Blood Glucose.: 170 mg/dL (28 Jan 2019 08:00)  POCT Blood Glucose.: 200 mg/dL (27 Jan 2019 21:46)  POCT Blood Glucose.: 180 mg/dL (27 Jan 2019 18:59)  POCT Blood Glucose.: 188 mg/dL (27 Jan 2019 13:01)    I&O's Summary    27 Jan 2019 07:01  -  28 Jan 2019 07:00  --------------------------------------------------------  IN: 1020 mL / OUT: 3100 mL / NET: -2080 mL        PHYSICAL EXAM:  GENERAL: NAD  HEENT: neck supple  LUNG: Clear to auscultation bilaterally; No wheeze  HEART: S1, S2  ABDOMEN: Soft, Nontender, Nondistended, Bowel sounds present  EXTREMITIES: No leg edema  PSYCH: normal affect, calm  NEUROLOGY: AAO x3   SKIN: no phlebitis, incision C/D/I      LABS:                        12.4   16.14 )-----------( 295      ( 28 Jan 2019 08:28 )             36.3     01-28    137  |  100  |  9   ----------------------------<  170<H>  4.2   |  27  |  0.85    Ca    8.8      28 Jan 2019 07:10                RADIOLOGY & ADDITIONAL TESTS:    Imaging Personally Reviewed:    < from: [facility] Duplex Lower Ext Vein Scan, Thor (01.25.19 @ 12:08) >  IMPRESSION:     No evidence of bilateral lower extremity deep venous thrombosis.    < end of copied text >    < from: Xray Chest 1 View- PORTABLE-Routine (01.25.19 @ 23:34) >  Impression:    The heart is normal in size. The lungs are clear. No change in appearance   the chest when compared to previous study done July 16, thousand 11..    < end of copied text >    Consultant(s) Notes Reviewed:  Endocrine    Care Discussed with Consultants/Other Providers: neurosurgery PA

## 2019-07-05 ENCOUNTER — LABORATORY RESULT (OUTPATIENT)
Age: 39
End: 2019-07-05

## 2019-07-05 ENCOUNTER — APPOINTMENT (OUTPATIENT)
Dept: NEUROSURGERY | Facility: CLINIC | Age: 39
End: 2019-07-05
Payer: COMMERCIAL

## 2019-07-05 VITALS
TEMPERATURE: 98.5 F | DIASTOLIC BLOOD PRESSURE: 82 MMHG | WEIGHT: 229 LBS | RESPIRATION RATE: 17 BRPM | HEART RATE: 99 BPM | OXYGEN SATURATION: 97 % | SYSTOLIC BLOOD PRESSURE: 119 MMHG | HEIGHT: 70 IN | BODY MASS INDEX: 32.78 KG/M2

## 2019-07-05 PROCEDURE — 99213 OFFICE O/P EST LOW 20 MIN: CPT

## 2019-07-05 RX ORDER — ASCORBIC ACID/MULTIVIT-MIN 1000 MG
EFFERVESCENT POWDER IN PACKET ORAL
Refills: 0 | Status: DISCONTINUED | COMMUNITY
Start: 2019-03-08 | End: 2019-07-05

## 2019-07-05 RX ORDER — MELATONIN 3 MG
25 MCG TABLET ORAL
Refills: 0 | Status: DISCONTINUED | COMMUNITY
Start: 2019-03-08 | End: 2019-07-05

## 2019-07-05 RX ORDER — DEXAMETHASONE 1 MG/1
1 TABLET ORAL
Qty: 1 | Refills: 0 | Status: DISCONTINUED | COMMUNITY
Start: 2019-03-13 | End: 2019-07-05

## 2019-07-08 LAB
ESTIMATED AVERAGE GLUCOSE: 154 MG/DL
HBA1C MFR BLD HPLC: 7 %

## 2019-07-11 NOTE — PHYSICAL EXAM
[General Appearance - In No Acute Distress] : in no acute distress [General Appearance - Alert] : alert [General Appearance - Well Developed] : well developed [General Appearance - Well-Appearing] : healthy appearing [General Appearance - Well Nourished] : well nourished [Well-Healed] : well-healed [Oriented To Time, Place, And Person] : oriented to person, place, and time [Impaired Insight] : insight and judgment were intact [Mood] : the mood was normal [Affect] : the affect was normal [Memory Recent] : recent memory was not impaired [Person] : oriented to person [Place] : oriented to place [Time] : oriented to time [Cranial Nerves Optic (II)] : visual acuity intact bilaterally,  pupils equal round and reactive to light [Cranial Nerves Oculomotor (III)] : extraocular motion intact [Cranial Nerves Facial (VII)] : face symmetrical [Cranial Nerves Trigeminal (V)] : facial sensation intact symmetrically [Cranial Nerves Glossopharyngeal (IX)] : tongue and palate midline [Cranial Nerves Vestibulocochlear (VIII)] : hearing was intact bilaterally [Cranial Nerves Accessory (XI - Cranial And Spinal)] : head turning and shoulder shrug symmetric [Motor Strength] : muscle strength was normal in all four extremities [Cranial Nerves Hypoglossal (XII)] : there was no tongue deviation with protrusion [Motor Handedness Right-Handed] : the patient is right hand dominant [Able to toe walk] : the patient was able to toe walk [Able to heel walk] : the patient was able to heel walk [Sclera] : the sclera and conjunctiva were normal [PERRL With Normal Accommodation] : pupils were equal in size, round, reactive to light, with normal accommodation [Extraocular Movements] : extraocular movements were intact [Outer Ear] : the ears and nose were normal in appearance [Hearing Threshold Finger Rub Not [place]] : hearing was normal [Oropharynx] : the oropharynx was normal [Respiration, Rhythm And Depth] : normal respiratory rhythm and effort [Neck Appearance] : the appearance of the neck was normal [Abnormal Walk] : normal gait [Exaggerated Use Of Accessory Muscles For Inspiration] : no accessory muscle use [Involuntary Movements] : no involuntary movements were seen [Skin Color & Pigmentation] : normal skin color and pigmentation [Motor Tone] : muscle strength and tone were normal [] : no rash [FreeTextEntry1] : wears glasses

## 2019-07-11 NOTE — ASSESSMENT
[FreeTextEntry1] : IMPRESSION:\par 1. Pt has recovered well s/p lumbar spine surgery on 1/22/19.\par \par \par PLAN:\par 1. F/U in 3 months with x-ray LS w/o to evaluate hardware.\par 2. OK to travel via airplane - plans to go to [place].\par 3. No heavy lifting > 25 lbs.\par 4. Advised patient to increase exercise routine gradually - preferably using an elliptical machine instead of a thread mill to avoid high impact.\par 5. Okay to wean self off Gabapentin as tolerated.\par 6.  CT at one year post op (January 2020)\par 6. Explained to patient that the healing process after fusion can take years sometimes.\par

## 2019-07-11 NOTE — DATA REVIEWED
[de-identified] : EXAM: LUMBAR SPINE AP AND LATERAL \par \par \par PROCEDURE DATE: 04/19/2019 \par \par \par \par \par INTERPRETATION: CLINICAL INDICATION: 12 weeks status post L5/S1 fusion. \par Evaluate for hardware integrity. \par \par EXAM: AP and lateral views of the lumbosacral spine. \par \par COMPARISON: Lumbar spine CT dated 1/25/2019. \par \par \par IMPRESSION: \par Status post posterior decompression at L5/S1 with posterior measurement \par fusion and interbody spacer. No evidence of hardware complication. \par Preservation of the vertebral body heights. Sacroiliac joints are intact. \par \par \par \par \par \par \par \par \par \par YANE VERMA M.D., ATTENDING RADIOLOGIST \par This document has been electronically signed. Apr 19 2019 10:59AM

## 2019-07-11 NOTE — HISTORY OF PRESENT ILLNESS
[FreeTextEntry1] : Braydon Corral is a pleasant right handed 38 year old gentleman who is  s/p bilateral L5-S1 TLIF's with PLIF with pedicle screw fixation; intraoperative CSF leak with primary repair on 1/22/2019. He presents for his 3 months post op evaluation without any specific complains.  He reports that he is currently working from home and if he experiences any discomfort in his lower back after sitting for a prolonged period that the feeling is relieved with rest or moving about. He stated that he has not used analgesics for the past 3 months.  He reports that he attends PT 1 x week and that the had started taking the bone stimulator on 5/13/19 but discontinued use after 3 weeks due to "feeling more aches" in his lower back 4-5/10 at the time.  He discontinued daily use and reports that he only takes it every other day without any lower back discomfort noted. \par \par His lower back incision is well healed.\par

## 2019-09-12 ENCOUNTER — MEDICATION RENEWAL (OUTPATIENT)
Age: 39
End: 2019-09-12

## 2019-10-02 ENCOUNTER — RX RENEWAL (OUTPATIENT)
Age: 39
End: 2019-10-02

## 2019-10-04 ENCOUNTER — APPOINTMENT (OUTPATIENT)
Dept: NEUROSURGERY | Facility: CLINIC | Age: 39
End: 2019-10-04
Payer: COMMERCIAL

## 2019-10-04 ENCOUNTER — OUTPATIENT (OUTPATIENT)
Dept: OUTPATIENT SERVICES | Facility: HOSPITAL | Age: 39
LOS: 1 days | End: 2019-10-04
Payer: COMMERCIAL

## 2019-10-04 VITALS
HEIGHT: 70 IN | OXYGEN SATURATION: 98 % | TEMPERATURE: 98.3 F | SYSTOLIC BLOOD PRESSURE: 111 MMHG | WEIGHT: 229 LBS | RESPIRATION RATE: 18 BRPM | DIASTOLIC BLOOD PRESSURE: 77 MMHG | HEART RATE: 84 BPM | BODY MASS INDEX: 32.78 KG/M2

## 2019-10-04 DIAGNOSIS — L72.9 FOLLICULAR CYST OF THE SKIN AND SUBCUTANEOUS TISSUE, UNSPECIFIED: Chronic | ICD-10-CM

## 2019-10-04 DIAGNOSIS — M54.16 RADICULOPATHY, LUMBAR REGION: ICD-10-CM

## 2019-10-04 DIAGNOSIS — M48.07 SPINAL STENOSIS, LUMBOSACRAL REGION: ICD-10-CM

## 2019-10-04 DIAGNOSIS — Z98.890 OTHER SPECIFIED POSTPROCEDURAL STATES: Chronic | ICD-10-CM

## 2019-10-04 PROCEDURE — 72110 X-RAY EXAM L-2 SPINE 4/>VWS: CPT

## 2019-10-04 PROCEDURE — 99213 OFFICE O/P EST LOW 20 MIN: CPT

## 2019-10-04 PROCEDURE — 72110 X-RAY EXAM L-2 SPINE 4/>VWS: CPT | Mod: 26

## 2019-10-04 NOTE — PHYSICAL EXAM
[General Appearance - In No Acute Distress] : in no acute distress [General Appearance - Alert] : alert [Person] : oriented to person [Place] : oriented to place [Time] : oriented to time [5] : S1 ankle flexors 5/5 [Balance] : balance was intact [] : no respiratory distress [Abnormal Walk] : normal gait

## 2019-10-11 NOTE — REASON FOR VISIT
[Spouse] : spouse [Parent] : parent [FreeTextEntry1] : Braydon Corral is a pleasant right handed 39 year old gentleman who is s/p bilateral L5-S1 TLIF's with PLIF with pedicle screw fixation; intraoperative CSF leak with primary repair on 1/22/2019. He presents for follow up evaluation without any specific complains. He continues to work from home, in the office the chair at work hurts his back. He denies any lower extremity numbness or tingling. He picked up something about 10lbs and that hurt his back and he had to put it down. He is able to  his laptop and that does not cause any pain. He denies any low back pain at rest, unless he sits for a while and then his back hurts. He has finished working with PT. His lower back incision is well healed. He had x rays done today which we will review.

## 2019-10-11 NOTE — ASSESSMENT
[FreeTextEntry1] : Braydon Corral is a pleasant right handed 39 year old gentleman who is s/p bilateral L5-S1 TLIF's with PLIF with pedicle screw fixation; intraoperative CSF leak with primary repair on 1/22/2019.  His low back pain has improved and is now only present when he sits for long periods of time or when he lifts things greater than 10lbs. He denies any associated radiculopathy. He has stopped the gabapentin. He has finished working with PT. He had flexion and extension lumbar spine x rays today which showed no instability hardware remains in place. I educated him on the importance of smoking cessation. I recommend he continue the bone stimulator daily. He will have CT scan of the lumbar spine in three months then follow up in our office after. Appointment given for 1/24/2020 at 1030am.

## 2019-10-11 NOTE — DATA REVIEWED
[de-identified] : LS spine flex/ext/ lateral.  Instrumentation in good position, no dynamic instability

## 2019-10-11 NOTE — RESULTS/DATA
[FreeTextEntry1] : EXAM: LUMBO-SACRAL SPINE-4 VIEWS \par \par \par PROCEDURE DATE: 10/04/2019 \par \par \par \par \par INTERPRETATION: EXAMINATION: Standing AP and lateral views of the lumbar \par spine and lateral flexion and extension views of the lumbar spine \par \par CLINICAL INFORMATION: Follow-up status post lumbar spine surgery on \par 1/22/2019. Lumbar radiculopathy. \par \par COMPARISON: Lumbar spine radiographs 4/19/2019. \par \par IMPRESSION: \par Redemonstrated postsurgical changes status post posterior fusion and \par decompression at L5-S1 with bilateral pedicle screws and vertical connecting \par rods in place and interbody fusion devices at L5-S1. The surgical hardware \par appears grossly intact. Vertebral body heights are preserved. No acute \par displaced fracture or subluxation is seen. No significant abnormal motion is \par seen with flexion and extension. \par \par \par \par \par \par \par \par \par \par NANCIE HUANG M.D., ATTENDING RADIOLOGIST \par This document has been electronically signed. Oct 4 2019 10:21AM \par \par \par

## 2019-10-23 ENCOUNTER — EMERGENCY (EMERGENCY)
Facility: HOSPITAL | Age: 39
LOS: 1 days | Discharge: HOME | End: 2019-10-23
Attending: EMERGENCY MEDICINE
Payer: COMMERCIAL

## 2019-10-23 VITALS
DIASTOLIC BLOOD PRESSURE: 93 MMHG | WEIGHT: 235.01 LBS | TEMPERATURE: 98 F | HEIGHT: 68 IN | HEART RATE: 106 BPM | SYSTOLIC BLOOD PRESSURE: 141 MMHG | RESPIRATION RATE: 18 BRPM | OXYGEN SATURATION: 100 %

## 2019-10-23 VITALS
HEART RATE: 63 BPM | TEMPERATURE: 98 F | DIASTOLIC BLOOD PRESSURE: 64 MMHG | SYSTOLIC BLOOD PRESSURE: 102 MMHG | RESPIRATION RATE: 16 BRPM | OXYGEN SATURATION: 100 %

## 2019-10-23 DIAGNOSIS — Z98.890 OTHER SPECIFIED POSTPROCEDURAL STATES: Chronic | ICD-10-CM

## 2019-10-23 DIAGNOSIS — L72.9 FOLLICULAR CYST OF THE SKIN AND SUBCUTANEOUS TISSUE, UNSPECIFIED: Chronic | ICD-10-CM

## 2019-10-23 LAB
ALBUMIN SERPL ELPH-MCNC: 4 G/DL — SIGNIFICANT CHANGE UP (ref 3.3–5)
ALP SERPL-CCNC: 69 U/L — SIGNIFICANT CHANGE UP (ref 40–120)
ALT FLD-CCNC: 58 U/L — HIGH (ref 10–45)
ANION GAP SERPL CALC-SCNC: 13 MMOL/L — SIGNIFICANT CHANGE UP (ref 5–17)
APTT BLD: 28.5 SEC — SIGNIFICANT CHANGE UP (ref 27.5–36.3)
AST SERPL-CCNC: 34 U/L — SIGNIFICANT CHANGE UP (ref 10–40)
BASOPHILS # BLD AUTO: 0.09 K/UL — SIGNIFICANT CHANGE UP (ref 0–0.2)
BASOPHILS NFR BLD AUTO: 0.7 % — SIGNIFICANT CHANGE UP (ref 0–2)
BILIRUB SERPL-MCNC: 0.4 MG/DL — SIGNIFICANT CHANGE UP (ref 0.2–1.2)
BUN SERPL-MCNC: 8 MG/DL — SIGNIFICANT CHANGE UP (ref 7–23)
CALCIUM SERPL-MCNC: 9.5 MG/DL — SIGNIFICANT CHANGE UP (ref 8.4–10.5)
CHLORIDE SERPL-SCNC: 102 MMOL/L — SIGNIFICANT CHANGE UP (ref 96–108)
CO2 SERPL-SCNC: 24 MMOL/L — SIGNIFICANT CHANGE UP (ref 22–31)
CREAT SERPL-MCNC: 0.99 MG/DL — SIGNIFICANT CHANGE UP (ref 0.5–1.3)
D DIMER BLD IA.RAPID-MCNC: <150 NG/ML DDU — SIGNIFICANT CHANGE UP
EOSINOPHIL # BLD AUTO: 0.47 K/UL — SIGNIFICANT CHANGE UP (ref 0–0.5)
EOSINOPHIL NFR BLD AUTO: 3.5 % — SIGNIFICANT CHANGE UP (ref 0–6)
GLUCOSE SERPL-MCNC: 129 MG/DL — HIGH (ref 70–99)
HCT VFR BLD CALC: 44.5 % — SIGNIFICANT CHANGE UP (ref 39–50)
HGB BLD-MCNC: 15.6 G/DL — SIGNIFICANT CHANGE UP (ref 13–17)
IMM GRANULOCYTES NFR BLD AUTO: 0.5 % — SIGNIFICANT CHANGE UP (ref 0–1.5)
INR BLD: 0.97 RATIO — SIGNIFICANT CHANGE UP (ref 0.88–1.16)
LYMPHOCYTES # BLD AUTO: 27.7 % — SIGNIFICANT CHANGE UP (ref 13–44)
LYMPHOCYTES # BLD AUTO: 3.71 K/UL — HIGH (ref 1–3.3)
MCHC RBC-ENTMCNC: 26.5 PG — LOW (ref 27–34)
MCHC RBC-ENTMCNC: 35.1 GM/DL — SIGNIFICANT CHANGE UP (ref 32–36)
MCV RBC AUTO: 75.6 FL — LOW (ref 80–100)
MONOCYTES # BLD AUTO: 0.86 K/UL — SIGNIFICANT CHANGE UP (ref 0–0.9)
MONOCYTES NFR BLD AUTO: 6.4 % — SIGNIFICANT CHANGE UP (ref 2–14)
NEUTROPHILS # BLD AUTO: 8.2 K/UL — HIGH (ref 1.8–7.4)
NEUTROPHILS NFR BLD AUTO: 61.2 % — SIGNIFICANT CHANGE UP (ref 43–77)
NRBC # BLD: 0 /100 WBCS — SIGNIFICANT CHANGE UP (ref 0–0)
PLATELET # BLD AUTO: 326 K/UL — SIGNIFICANT CHANGE UP (ref 150–400)
POTASSIUM SERPL-MCNC: 4 MMOL/L — SIGNIFICANT CHANGE UP (ref 3.5–5.3)
POTASSIUM SERPL-SCNC: 4 MMOL/L — SIGNIFICANT CHANGE UP (ref 3.5–5.3)
PROT SERPL-MCNC: 6.9 G/DL — SIGNIFICANT CHANGE UP (ref 6–8.3)
PROTHROM AB SERPL-ACNC: 11.1 SEC — SIGNIFICANT CHANGE UP (ref 10–12.9)
RBC # BLD: 5.89 M/UL — HIGH (ref 4.2–5.8)
RBC # FLD: 14 % — SIGNIFICANT CHANGE UP (ref 10.3–14.5)
SODIUM SERPL-SCNC: 139 MMOL/L — SIGNIFICANT CHANGE UP (ref 135–145)
TROPONIN T, HIGH SENSITIVITY RESULT: 10 NG/L — SIGNIFICANT CHANGE UP (ref 0–51)
TROPONIN T, HIGH SENSITIVITY RESULT: 12 NG/L — SIGNIFICANT CHANGE UP (ref 0–51)
WBC # BLD: 13.4 K/UL — HIGH (ref 3.8–10.5)
WBC # FLD AUTO: 13.4 K/UL — HIGH (ref 3.8–10.5)

## 2019-10-23 PROCEDURE — 84484 ASSAY OF TROPONIN QUANT: CPT

## 2019-10-23 PROCEDURE — 85379 FIBRIN DEGRADATION QUANT: CPT

## 2019-10-23 PROCEDURE — 71046 X-RAY EXAM CHEST 2 VIEWS: CPT | Mod: 26

## 2019-10-23 PROCEDURE — 93010 ELECTROCARDIOGRAM REPORT: CPT

## 2019-10-23 PROCEDURE — 85610 PROTHROMBIN TIME: CPT

## 2019-10-23 PROCEDURE — 71046 X-RAY EXAM CHEST 2 VIEWS: CPT

## 2019-10-23 PROCEDURE — 85027 COMPLETE CBC AUTOMATED: CPT

## 2019-10-23 PROCEDURE — 85730 THROMBOPLASTIN TIME PARTIAL: CPT

## 2019-10-23 PROCEDURE — 99285 EMERGENCY DEPT VISIT HI MDM: CPT

## 2019-10-23 PROCEDURE — 93308 TTE F-UP OR LMTD: CPT

## 2019-10-23 PROCEDURE — 80053 COMPREHEN METABOLIC PANEL: CPT

## 2019-10-23 PROCEDURE — 99284 EMERGENCY DEPT VISIT MOD MDM: CPT | Mod: 25

## 2019-10-23 PROCEDURE — 93005 ELECTROCARDIOGRAM TRACING: CPT

## 2019-10-23 RX ORDER — ASPIRIN/CALCIUM CARB/MAGNESIUM 324 MG
324 TABLET ORAL ONCE
Refills: 0 | Status: COMPLETED | OUTPATIENT
Start: 2019-10-23 | End: 2019-10-23

## 2019-10-23 RX ORDER — IBUPROFEN 200 MG
600 TABLET ORAL ONCE
Refills: 0 | Status: COMPLETED | OUTPATIENT
Start: 2019-10-23 | End: 2019-10-23

## 2019-10-23 RX ADMIN — Medication 324 MILLIGRAM(S): at 09:16

## 2019-10-23 RX ADMIN — Medication 600 MILLIGRAM(S): at 09:17

## 2019-10-23 RX ADMIN — Medication 600 MILLIGRAM(S): at 10:00

## 2019-10-23 NOTE — ED PROVIDER NOTE - ATTENDING CONTRIBUTION TO CARE
In this physician's medical judgement based on clinical history and physical exam, patient with chest pressure awakening him from sleep. No exertional component. + pain with breathing, worse with lying flat, better with sitting up. + diabetes mellitus and smoker (pt educated to stop smoking)  GEN: NAD, awake, eyes open spontaneously, obese by bmi  HEENT: NCAT, MMM, Trachea midline, normal conjunctiva, perrl  CHEST/LUNGS: Non-tachypneic, CTAB, bilateral breath sounds  CARDIAC: Non-tachycardic, normal perfusion  ABDOMEN: Soft, NTND, No rebound/guarding  MSK: No edema, no gross deformity of extremities  SKIN: No rashes, no petechiae, no vesicles  NEURO: CN grossly intact, normal coordination, no focal motor or sensory deficits  PSYCH: Alert, appropriate, cooperative, with capacity and insight   will get iv cbc, cmp, ce, ekg, and cxr, will reassess  ce 10 will repeat in 3 hours  patient educated on findings and ultrasound without pericardial effusion and no pr depressions on ekg consistent with pericarditis   Patient serially evaluated throughout emergency department course. There was no acute deterioration up to this time in the department. Patient has demonstrated marked clinical improvement, feels better at this time according to emergency department team. Agree with goals/plan of emergency department care as described in electronic medical record, including diagnostics, therapeutics and consultation as clinically warranted. Will discharge home with close outpatient follow up with primary care physician/provider and specialist if necessary. Patient educated on concerning signs and features to return to the emergency department, in layman terms, including but not limited to: nausea, vomiting, fever, chills, persistent/worsening symptoms or any concerns at all. No immediate life threatening issues present on history or clinical exam. Patient is a safe disposition home, has capacity and insight into their condition, is ambulatory in the Emergency Department with no further questions and will follow up with their doctor(s) this week. Patient and family understand anticipatory guidance were given strict return and follow up precautions.  The patient and family have been informed of all concerning signs and symptoms to return to Emergency Department, the necessity to follow up with the PMD/Clinic/follow up provided within 2-3 days was explained, and the patient and/or family reports understanding of above with capacity and insight. In this physician's medical judgement based on clinical history and physical exam, patient with chest pressure awakening him from sleep. No exertional component. + pain with breathing, worse with lying flat, better with sitting up. + diabetes mellitus and smoker (pt educated to stop smoking)  GEN: NAD, awake, eyes open spontaneously, obese by bmi  HEENT: NCAT, MMM, Trachea midline, normal conjunctiva, perrl  CHEST/LUNGS: Non-tachypneic, CTAB, bilateral breath sounds  CARDIAC: Non-tachycardic, normal perfusion  ABDOMEN: Soft, NTND, No rebound/guarding  MSK: No edema, no gross deformity of extremities  SKIN: No rashes, no petechiae, no vesicles  NEURO: CN grossly intact, normal coordination, no focal motor or sensory deficits  PSYCH: Alert, appropriate, cooperative, with capacity and insight   will get iv cbc, cmp, ce, ekg, and cxr, will reassess  ce 10 will repeat in 3 hours  patient educated on findings and ultrasound without pericardial effusion and no pr depressions on ekg consistent with pericarditis   Patient serially evaluated throughout emergency department course. There was no acute deterioration up to this time in the department. Patient has demonstrated marked clinical improvement, feels better at this time according to emergency department team. Agree with goals/plan of emergency department care as described in electronic medical record, including diagnostics, therapeutics and consultation as clinically warranted. Will discharge home with close outpatient follow up with primary care physician/provider and specialist if necessary. Patient educated on concerning signs and features to return to the emergency department, in layman terms, including but not limited to: nausea, vomiting, fever, chills, persistent/worsening symptoms or any concerns at all. No immediate life threatening issues present on history or clinical exam. Patient is a safe disposition home, has capacity and insight into their condition, is ambulatory in the Emergency Department with no further questions and will follow up with their doctor(s) this week. Patient and family understand anticipatory guidance were given strict return and follow up precautions.  The patient and family have been informed of all concerning signs and symptoms to return to Emergency Department, the necessity to follow up with the PMD/Clinic/follow up provided within 2-3 days was explained, and the patient and/or family reports understanding of above with capacity and insight. The patient and/or family were informed of the results of their tests and are were encouraged to follow up on the findings with their doctor (as well as the need to inform their doctor of the results). The patient and/or family are aware of the need to follow up with repeat testing as applicable and report understanding of the above with capacity and insight.

## 2019-10-23 NOTE — ED ADULT NURSE NOTE - OBJECTIVE STATEMENT
40 y/o male patient presents ambulatory to ED with family at the bedside complaining of midsternal chest pain which started ~6AM today. Patient denies SOB, N/V/D; afebrile in ED. 40 y/o male patient presents ambulatory to ED with family at the bedside complaining of midsternal chest pain which started ~6AM today. Patient states the pain worsens with laying flat and improves with sitting or standing. Per patient, while en route to the ED, mild headache which has since resolved. Patient is a current everyday smoker, +sick contact at home, no recent travel. Patient denies SOB, N/V/D; afebrile in ED. PMHX DM on metforming, sickle cell trait, possible TIA, chronic lower back pain- lumbar fusion. 38 y/o male patient presents ambulatory to ED with family at the bedside complaining of midsternal chest pain which started ~6AM today. Patient states the pain worsens with deep inspiration, laying flat and improves with sitting or standing. Per patient, while en route to the ED, mild headache which has since resolved. Patient is a current everyday smoker, +sick contact at home, no recent travel. Patient denies SOB, N/V/D; afebrile in ED. PMHX DM on metformin, sickle cell trait, possible TIA, chronic lower back pain- lumbar fusion.

## 2019-10-23 NOTE — ED PROVIDER NOTE - MDM ORDERS SUBMITTED SELECTION
"Subjective:      Patient ID: Jasson Pineda is a 71 y.o. female.    Chief Complaint: Mood    HPI   Patient here today for follow up. Mood is better, anxiety is high some improvement, no panic attacks, no SI or HI. She did start taking the lexapro. She did eat more solid foods and felt like they got stuck. She has been able to swallow without any issues.       Review of Systems   Constitutional: Negative.    HENT: Negative.    Cardiovascular: Negative.    Genitourinary: Negative.    Neurological: Negative for headaches.     I personally reviewed Past Medical History, Past Surgical history,  Past Social History and Family History      Objective:   /70   Pulse 74   Ht 5' 2" (1.575 m)   Wt 68.5 kg (151 lb 0.2 oz)   SpO2 98%   BMI 27.62 kg/m²     Physical Exam   Constitutional: She is oriented to person, place, and time. She appears well-developed and well-nourished. No distress.   HENT:   Head: Normocephalic and atraumatic.   Right Ear: Hearing, tympanic membrane, external ear and ear canal normal.   Left Ear: Hearing, tympanic membrane, external ear and ear canal normal.   Nose: Nose normal.   Mouth/Throat: Uvula is midline and oropharynx is clear and moist. No oropharyngeal exudate.   Eyes: Conjunctivae and EOM are normal. Pupils are equal, round, and reactive to light. Right eye exhibits no discharge. Left eye exhibits no discharge. No scleral icterus.   Neck: Normal range of motion. Neck supple.   Cardiovascular: Normal rate, regular rhythm, normal heart sounds and intact distal pulses. Exam reveals no gallop.   No murmur heard.  Pulmonary/Chest: Effort normal and breath sounds normal. No respiratory distress. She has no wheezes. She has no rales. She exhibits no tenderness.   Abdominal: Soft. Bowel sounds are normal. She exhibits no distension and no mass. There is no tenderness. There is no rebound and no guarding.   Neurological: She is alert and oriented to person, place, and time.   Vitals " reviewed.      1. Anxiety    2. Gastroesophageal reflux disease, esophagitis presence not specified        1. Increase to 10 mg and return in 4 weeks  2. Return to soft foods and restart prilosec, GI follow up if no improvement        No orders of the defined types were placed in this encounter.    Medications Ordered This Encounter   Medications    escitalopram oxalate (LEXAPRO) 10 MG tablet     Sig: Take 1 tablet (10 mg total) by mouth once daily.     Dispense:  30 tablet     Refill:  1    omeprazole 20 mg TbEC     Sig: Take 1 tablet by mouth daily as needed.     Dispense:  30 each     Refill:  2      Medications/EKG/Imaging Studies/Labs

## 2019-10-23 NOTE — ED PROVIDER NOTE - PATIENT PORTAL LINK FT
You can access the FollowMyHealth Patient Portal offered by [facility] by registering at the following website: [email or web address]. By joining [employer]’s FollowMyHealth portal, you will also be able to view your health information using other applications (apps) compatible with our system.

## 2019-10-23 NOTE — ED PROVIDER NOTE - PROGRESS NOTE DETAILS
spoke to pt in detail about results. pending 2nd trop at 11. negative DDimer. negative cxr. pending bedside emr echo to r.o pericardial effusion. pt to follow up with PMD if ER workup is negative.

## 2019-10-23 NOTE — ED PROVIDER NOTE - NSFOLLOWUPINSTRUCTIONS_ED_ALL_ED_FT
1. Follow up with your PMD and/or cardiologist within 48-72 hours.   2. Show copies of your reports given to you. Please quit smoking.  Take all of your other medications as previously prescribed. You can try tylenol and/motrin for pain as directed.   3. Worsening or continued chest pain, shortness of breath, weakness, return to ED.

## 2019-10-23 NOTE — ED PROVIDER NOTE - PMH
Disc displacement, lumbar    H/O pilonidal cyst    RBC microcytosis    Sickle cell trait    T2DM (type 2 diabetes mellitus)    TIA (transient ischemic attack)  possible, ~2012

## 2019-10-23 NOTE — ED PROVIDER NOTE - OBJECTIVE STATEMENT
39yom pmhx of DM on metformin, sickle cell trait, possible TIA (2012), RBC microcytosis, chronic lower back pain s/p lumbar fusion with Dr. Arce here with L sided stabbing chest pain since 6am, constant pain. worse with supine and improves with sitting or standing. No nausea or vomiting or diarrhea. No numbness or tingling. +sick contacts with viral illness. No pain meds. +Current smoker. Travel to [place] in August. No sob. No calf pain.

## 2019-10-23 NOTE — ED PROVIDER NOTE - NS ED ROS FT
Constitutional: No fever or chills  Eyes: No visual changes, eye pain or redness  HEENT: No throat pain, ear pain, nasal pain. No nose bleeding.  CV: +chest pain -lower extremity edema  Resp: No SOB no cough  GI: No abd pain. No nausea or vomiting. No diarrhea. No constipation.   : No dysuria, hematuria.   MSK: No musculoskeletal pain  Skin: No rash  Neuro: +headache. No numbness or tingling. No weakness.

## 2019-10-23 NOTE — ED PROVIDER NOTE - CLINICAL SUMMARY MEDICAL DECISION MAKING FREE TEXT BOX
Chest pain- tele monitoring, Serial CE's with EKG x2, echo to r.o pericardial effusion; DDimer; re román. Chest pain- tele monitoring, Serial CE's with EKG x2, echo to r.o pericardial effusion; DDimer; re eval.  labs within normal limits, no clinically significant pericardial effusion, patient educated on  results, encouraged to Follow up with cardiology within the week and rapid Follow up sheet given.

## 2019-10-23 NOTE — ED ADULT NURSE NOTE - NSIMPLEMENTINTERV_GEN_ALL_ED
Implemented All Universal Safety Interventions:  [place] to call system. Call bell, personal items and telephone within reach. Instruct patient to call for assistance. Room bathroom lighting operational. Non-slip footwear when patient is off stretcher. Physically safe environment: no spills, clutter or unnecessary equipment. Stretcher in lowest position, wheels locked, appropriate side rails in place.

## 2019-10-23 NOTE — ED PROVIDER NOTE - PHYSICAL EXAMINATION
Gen: AAO x 3, NAD  Skin: No rashes or lesions  HEENT: NC/AT, PERRLA, EOMI, MMM  Resp: unlabored CTAB  Cardiac: rrr s1s2, no murmurs, rubs or gallops  GI: ND, +BS, Soft, NT  Ext: LLE swelling; No calf tenderness. FROM in all extremities  Neuro: no focal deficits

## 2019-10-24 PROCEDURE — 93308 TTE F-UP OR LMTD: CPT | Mod: 26

## 2019-12-18 NOTE — REASON FOR VISIT
Problem: Falls - Risk of  Goal: *Absence of Falls  Description  Document Ivania Levy Fall Risk and appropriate interventions in the flowsheet.   Outcome: Progressing Towards Goal  Note: Fall Risk Interventions:            Medication Interventions: Evaluate medications/consider consulting pharmacy, Patient to call before getting OOB, Teach patient to arise slowly         History of Falls Interventions: Door open when patient unattended, Investigate reason for fall, Room close to nurse's station         Problem: Pain  Goal: *Control of Pain  Outcome: Progressing Towards Goal     Problem: Cath Lab Procedures: Post-Cath Day 1  Goal: Activity/Safety  Outcome: Progressing Towards Goal [Initial Consultation] : an initial consultation for [Blood Count Assessment] : blood count assessment [Leukocytosis] : leukocytosis [Spouse] : spouse [FreeTextEntry2] : sickle cell trait and RBC microcytosis and prenatal counseling

## 2020-02-03 ENCOUNTER — OUTPATIENT (OUTPATIENT)
Dept: OUTPATIENT SERVICES | Facility: HOSPITAL | Age: 40
LOS: 1 days | End: 2020-02-03
Payer: COMMERCIAL

## 2020-02-03 ENCOUNTER — APPOINTMENT (OUTPATIENT)
Dept: NEUROSURGERY | Facility: CLINIC | Age: 40
End: 2020-02-03
Payer: COMMERCIAL

## 2020-02-03 VITALS
OXYGEN SATURATION: 95 % | SYSTOLIC BLOOD PRESSURE: 111 MMHG | HEART RATE: 95 BPM | WEIGHT: 229 LBS | HEIGHT: 70 IN | BODY MASS INDEX: 32.78 KG/M2 | DIASTOLIC BLOOD PRESSURE: 73 MMHG | RESPIRATION RATE: 16 BRPM | TEMPERATURE: 98.6 F

## 2020-02-03 DIAGNOSIS — Z98.890 OTHER SPECIFIED POSTPROCEDURAL STATES: Chronic | ICD-10-CM

## 2020-02-03 DIAGNOSIS — M48.07 SPINAL STENOSIS, LUMBOSACRAL REGION: ICD-10-CM

## 2020-02-03 DIAGNOSIS — L72.9 FOLLICULAR CYST OF THE SKIN AND SUBCUTANEOUS TISSUE, UNSPECIFIED: Chronic | ICD-10-CM

## 2020-02-03 DIAGNOSIS — M51.26 OTHER INTERVERTEBRAL DISC DISPLACEMENT, LUMBAR REGION: ICD-10-CM

## 2020-02-03 PROCEDURE — 72131 CT LUMBAR SPINE W/O DYE: CPT

## 2020-02-03 PROCEDURE — 72131 CT LUMBAR SPINE W/O DYE: CPT | Mod: 26

## 2020-02-03 PROCEDURE — 99214 OFFICE O/P EST MOD 30 MIN: CPT

## 2020-02-03 NOTE — HISTORY OF PRESENT ILLNESS
[FreeTextEntry1] : Braydon Corral is a pleasant right handed 39 year old gentleman who is s/p bilateral L5-S1 TLIF's with PLIF with pedicle screw fixation; intraoperative CSF leak with primary repair on 1/22/2019. Last seen in this office on 10/04/19 with no complaints and advised to do CT LS spine in 3 months and he is here today after the imaging.\par \par Today Mr. Corral present ambulatory believes that he improved over all.   He presents for follow up evaluation without any specific complains.His lower back incision is well healed.  He continues to work from home. He reports have a mild pain on right great toe while extending which is started around two months ago. Now he feel some loss of sensation in the area.He stopped taking Gabapentin in November 2019. He denies weakness of arms and able to carry things. He denies any low back pain at rest, unless he sits for a while and then his back hurts. He has finished working with PT. He continues to do the bone stimulator sometimes. He had  CT done today which we will review. \par  \par

## 2020-02-03 NOTE — RESULTS/DATA
[FreeTextEntry1] : CT today prelim view LS spine one year post surgery shows stable placement of hardware, no loosening of screws.  Has developing fusion of interbody cages and bone graft in between the cages.  Posterolateral bone appears to have been resorbed.

## 2020-02-03 NOTE — REASON FOR VISIT
[Follow-Up: _____] : a [unfilled] follow-up visit [Spouse] : spouse [FreeTextEntry1] : post surgery follow up

## 2020-02-03 NOTE — ASSESSMENT
[FreeTextEntry1] : Impression:\par s/p bilateral L5-S1  PLIF with pedicle screw fixation; intraoperative CSF leak with primary repair on 1/22/2019. Patient asymptomatic with mild paraesthesia on right big toe without any motor deficit. CT LS spine preliminary showed  unremarkable, will wait for the final report.\par \par \par Plan:\par \par Will restart Gabapentin and see if numbness get better.\par Will continue PT for back for 2-3 days /week for 8 weeks\par Follow up in 4 months\par Sooner if clinically indicated

## 2020-02-03 NOTE — PHYSICAL EXAM
[General Appearance - Alert] : alert [General Appearance - Well Nourished] : well nourished [General Appearance - In No Acute Distress] : in no acute distress [General Appearance - Well-Appearing] : healthy appearing [Longitudinal] : longitudinal [Well-Healed] : well-healed [Impaired Insight] : insight and judgment were intact [Oriented To Time, Place, And Person] : oriented to person, place, and time [Affect] : the affect was normal [Memory Recent] : recent memory was not impaired [Person] : oriented to person [Place] : oriented to place [Short Term Intact] : short term memory intact [Time] : oriented to time [Motor Tone] : muscle tone was normal in all four extremities [Motor Strength] : muscle strength was normal in all four extremities [Sensation Pain / Temperature Decrease] : pain and temperature was intact [Sensation Tactile Decrease] : light touch was intact [Balance] : balance was intact [Limited Balance] : the patient's balance was impaired [No Visual Abnormalities] : no visible abnormailities [Full ROM] : full ROM [No Masses] : no masses [No Pain with ROM] : no pain with motion in any direction [PERRL With Normal Accommodation] : pupils were equal in size, round, reactive to light, with normal accommodation [Full Visual Field] : full visual field [Outer Ear] : the ears and nose were normal in appearance [Neck Appearance] : the appearance of the neck was normal [Hearing Threshold Finger Rub Not McKean] : hearing was normal [Neck Cervical Mass (___cm)] : no neck mass was observed [Exaggerated Use Of Accessory Muscles For Inspiration] : no accessory muscle use [] : no respiratory distress [Heart Rate And Rhythm] : heart rate was normal and rhythm regular [Involuntary Movements] : no involuntary movements were seen [Abnormal Walk] : normal gait [FreeTextEntry7] : less sensation on left lateral  big toe

## 2020-03-25 NOTE — H&P PST ADULT - MS EXT PE MLT D E PC
-Patient is pursuing [employer] after completing  [facility]-Intensive Lifestyle Intervention Program  -Initial weight loss goal of 5-10% weight loss for improved health  -Previously discussed options for weight loss medications- she is not currently interested and would like to continue focusing on lifestyle changes   -Caution Contrave as she is on Lexapro  -Dietary recall reviewed, suggestions provided  -Encouraged meditation and deep breathing  -Reviewed importance of food logging and exercise (when sx improve)     Initial: 334 2 (Pt had lost 15+ lbs prior to starting MWM)  Current: 395 per pt (+3 5 lbs since 2/11/20)   Change: -39 2 lbs (11 75 %)  Goal:  lbs; LTG under 200 lbs
5 8 --> 6% --> 5 6 %  -continue with lifestyle changes  -can consider metformin or Saxenda
HTN/CHF  -on carvedilol, lisinopril, and furosemide  -continue to monitor  -Continue management with Cardiology- Dr Po Subramanian  -per 8/28/19 visit with cardiology: "If loses 20 more lbs and systolic BP < 520 mmHg can stop coreg " Weight from 8/28 - 298
no clubbing/no pedal edema/no cyanosis

## 2020-06-29 ENCOUNTER — APPOINTMENT (OUTPATIENT)
Dept: NEUROSURGERY | Facility: CLINIC | Age: 40
End: 2020-06-29
Payer: COMMERCIAL

## 2020-06-29 ENCOUNTER — APPOINTMENT (OUTPATIENT)
Dept: NEUROSURGERY | Facility: CLINIC | Age: 40
End: 2020-06-29

## 2020-06-29 DIAGNOSIS — Z98.890 OTHER SPECIFIED POSTPROCEDURAL STATES: ICD-10-CM

## 2020-06-29 PROCEDURE — 99212 OFFICE O/P EST SF 10 MIN: CPT | Mod: 95

## 2020-06-30 PROBLEM — Z98.890 S/P LUMBAR LAMINECTOMY: Status: ACTIVE | Noted: 2020-06-30

## 2020-06-30 NOTE — PHYSICAL EXAM
[General Appearance - Alert] : alert [General Appearance - In No Acute Distress] : in no acute distress [General Appearance - Well Nourished] : well nourished [General Appearance - Well-Appearing] : healthy appearing [Oriented To Time, Place, And Person] : oriented to person, place, and time [Impaired Insight] : insight and judgment were intact [Affect] : the affect was normal [Memory Recent] : recent memory was not impaired [Person] : oriented to person [Place] : oriented to place [Time] : oriented to time [Short Term Intact] : short term memory intact [Abnormal Walk] : normal gait [Balance] : balance was intact

## 2020-07-01 NOTE — HISTORY OF PRESENT ILLNESS
[Home] : at home, [unfilled] , at the time of the visit. [Other:____] : [unfilled] [Verbal consent obtained from patient] : the patient, [unfilled] [Medical Office: ([facility])___] : at the medical office located in  [FreeTextEntry1] : Braydon Corral is a pleasant right handed 39 year old gentleman who is s/p bilateral L5-S1 TLIF's with PLIF with pedicle screw fixation; intraoperative CSF leak with primary repair on 1/22/2019. Last seen in this office on 10/04/19 with no complaints and advised to do CT LS spine in 3 months and he is here today after the imaging.\par \par Today Mr. Corral present for telehealth  believes that he improved over all. He continues to work from home. He denies weakness of arms. He denies any low back pain at rest, unless he sits for a while and then his back hurts. He reports have a mild pain on right great toe while extending which is started around two months ago.  He feel some loss of sensation in the area, still taking Gabapentin whenever he needs. . He has not been for PT  for long time due to pandemic. He continues to do the bone stimulator on and off.

## 2020-07-01 NOTE — ASSESSMENT
[FreeTextEntry1] : Impression:\par s/p bilateral L5-S1 PLIF with pedicle screw fixation; intraoperative CSF leak with primary repair on 1/22/2019. Patient asymptomatic with mild paraesthesia on right big toe without any motor deficit is getting better with gabapentin.\par \par Plan:\par \par Continue  Gabapentin and  numbness getting better.\par Will continue [facility]  PT for back for 2-3 days /week for 8 weeks\par Follow up in 4 months\par Sooner if clinically indicated.\par \par Telehealth 10:15 to 10:28 am (13 min)

## 2020-08-18 ENCOUNTER — RX RENEWAL (OUTPATIENT)
Age: 40
End: 2020-08-18

## 2020-09-29 ENCOUNTER — APPOINTMENT (OUTPATIENT)
Dept: INTERNAL MEDICINE | Facility: CLINIC | Age: 40
End: 2020-09-29

## 2021-07-23 ENCOUNTER — NON-APPOINTMENT (OUTPATIENT)
Age: 41
End: 2021-07-23

## 2021-09-08 LAB
25(OH)D3 SERPL-MCNC: 10.7 NG/ML
ALBUMIN SERPL ELPH-MCNC: 4.5 G/DL
ALP BLD-CCNC: 74 U/L
ALT SERPL-CCNC: 40 U/L
ANION GAP SERPL CALC-SCNC: 14 MMOL/L
APPEARANCE: CLEAR
AST SERPL-CCNC: 21 U/L
BASOPHILS # BLD AUTO: 0.1 K/UL
BASOPHILS NFR BLD AUTO: 0.8 %
BILIRUB SERPL-MCNC: 0.6 MG/DL
BILIRUBIN URINE: NEGATIVE
BLOOD URINE: NEGATIVE
BUN SERPL-MCNC: 10 MG/DL
CALCIUM SERPL-MCNC: 9.7 MG/DL
CHLORIDE SERPL-SCNC: 101 MMOL/L
CHOLEST SERPL-MCNC: 203 MG/DL
CO2 SERPL-SCNC: 24 MMOL/L
COLOR: YELLOW
CREAT SERPL-MCNC: 1.05 MG/DL
EOSINOPHIL # BLD AUTO: 0.53 K/UL
EOSINOPHIL NFR BLD AUTO: 4.3 %
GLUCOSE QUALITATIVE U: NEGATIVE
GLUCOSE SERPL-MCNC: 141 MG/DL
HCT VFR BLD CALC: 46.3 %
HDLC SERPL-MCNC: 32 MG/DL
HGB BLD-MCNC: 15.7 G/DL
IMM GRANULOCYTES NFR BLD AUTO: 0.5 %
KETONES URINE: NEGATIVE
LDLC SERPL CALC-MCNC: 118 MG/DL
LEUKOCYTE ESTERASE URINE: NEGATIVE
LYMPHOCYTES # BLD AUTO: 3.55 K/UL
LYMPHOCYTES NFR BLD AUTO: 29 %
MAN DIFF?: NORMAL
MCHC RBC-ENTMCNC: 26.9 PG
MCHC RBC-ENTMCNC: 33.9 GM/DL
MCV RBC AUTO: 79.3 FL
MONOCYTES # BLD AUTO: 0.67 K/UL
MONOCYTES NFR BLD AUTO: 5.5 %
NEUTROPHILS # BLD AUTO: 7.35 K/UL
NEUTROPHILS NFR BLD AUTO: 59.9 %
NITRITE URINE: NEGATIVE
NONHDLC SERPL-MCNC: 171 MG/DL
PH URINE: 6
PLATELET # BLD AUTO: 367 K/UL
POTASSIUM SERPL-SCNC: 4.8 MMOL/L
PROT SERPL-MCNC: 7 G/DL
PROTEIN URINE: NORMAL
RBC # BLD: 5.84 M/UL
RBC # FLD: 14 %
SODIUM SERPL-SCNC: 138 MMOL/L
SPECIFIC GRAVITY URINE: 1.02
TRIGL SERPL-MCNC: 263 MG/DL
TSH SERPL-ACNC: 2.02 UIU/ML
UROBILINOGEN URINE: NORMAL
WBC # FLD AUTO: 12.26 K/UL

## 2021-09-09 LAB
ESTIMATED AVERAGE GLUCOSE: 151 MG/DL
HBA1C MFR BLD HPLC: 6.9 %

## 2021-09-10 ENCOUNTER — NON-APPOINTMENT (OUTPATIENT)
Age: 41
End: 2021-09-10

## 2021-09-10 ENCOUNTER — APPOINTMENT (OUTPATIENT)
Dept: FAMILY MEDICINE | Facility: CLINIC | Age: 41
End: 2021-09-10
Payer: COMMERCIAL

## 2021-09-10 VITALS
OXYGEN SATURATION: 98 % | BODY MASS INDEX: 32.64 KG/M2 | HEART RATE: 84 BPM | WEIGHT: 228 LBS | HEIGHT: 70 IN | SYSTOLIC BLOOD PRESSURE: 120 MMHG | TEMPERATURE: 97.3 F | DIASTOLIC BLOOD PRESSURE: 80 MMHG

## 2021-09-10 PROCEDURE — 99385 PREV VISIT NEW AGE 18-39: CPT

## 2021-09-10 RX ORDER — GABAPENTIN 300 MG/1
300 CAPSULE ORAL 3 TIMES DAILY
Qty: 270 | Refills: 2 | Status: DISCONTINUED | COMMUNITY
Start: 2018-12-31 | End: 2021-09-10

## 2021-09-10 NOTE — HEALTH RISK ASSESSMENT
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

## 2021-09-10 NOTE — HISTORY OF PRESENT ILLNESS
[de-identified] : 41 year old male here to establish care and for a full physical examination. The patients complete medical, family and social history was documented and reviewed with the patient.  The patients medications were identified and also reviewed with the patient as well as any allergies to any medications. All active and previous medical problems were identified and discussed with the patient.  Any new problems were documented. Patient is feeling well today.\par

## 2021-09-10 NOTE — ASSESSMENT
[FreeTextEntry1] : spinal fusion of L4-L5\par \par diabetes\par The diagnosis of diabetes is established with this patient. Previous blood work was reviewed prior to the visit and with the patient at time of visit.  Blood work values were explained as well.  The patient was counseled on using a low sugar and carbohydrate diet.  Patient was advised to eat small meals high in protein and to exercise regularly. Patient as advised to take all medications as prescribed, compliance with medications was reinforced.  Patient should follow up with yearly podiatry and opthalmology visits. Patient was advised to call office  or go to the ER immediately if experiences any nausea, lightheadedness or for any other issues.\par a1c is 6.9, has not been consistent, only taking three a week\par \par cholesterol\par The diagnosis of high cholesterol is established and known to the patient and provider.  Previous blood work was evaluated at time of visit and reviewed with the patient. The patient was counseled on a low cholesterol diet and on medication compliance. Patient was advised to generally limit foods fried in oil, high in saturated fat, cheese, eggs and red meat. Patient was advised to continue on current medications and to followup in office for necessary blood work in three months.\par will monitor\par \par increase to daily and reassess\par \par low vitamin d \par will give supplement\par \par leukocyotsis \par saw hematologist, cleared

## 2021-09-28 RX ORDER — SITAGLIPTIN 100 MG/1
100 TABLET, FILM COATED ORAL
Qty: 90 | Refills: 1 | Status: DISCONTINUED | COMMUNITY
Start: 2021-09-27 | End: 2021-09-28

## 2021-11-23 ENCOUNTER — APPOINTMENT (OUTPATIENT)
Dept: FAMILY MEDICINE | Facility: CLINIC | Age: 41
End: 2021-11-23
Payer: COMMERCIAL

## 2021-11-23 PROCEDURE — 99214 OFFICE O/P EST MOD 30 MIN: CPT | Mod: 95

## 2021-11-23 RX ORDER — METFORMIN ER 500 MG 500 MG/1
500 TABLET ORAL
Qty: 360 | Refills: 3 | Status: DISCONTINUED | COMMUNITY
Start: 2019-03-25 | End: 2021-11-23

## 2021-11-23 NOTE — HISTORY OF PRESENT ILLNESS
[Home] : at home, [unfilled] , at the time of the visit. [Medical Office: ([facility])___] : at the medical office located in  [Verbal consent obtained from patient] : the patient, [unfilled] [de-identified] : 41 year old male is here for a followup visit. Patient is here for medication renewals and for blood work discussion. Medications and allergies were reviewed and assessed. \par wishes to discuss diabetes

## 2021-11-23 NOTE — HEALTH RISK ASSESSMENT
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

## 2021-11-23 NOTE — ASSESSMENT
[FreeTextEntry1] : spinal fusion of L4-L5\par \par diabetes\par The diagnosis of diabetes is established with this patient. Previous blood work was reviewed prior to the visit and with the patient at time of visit.  Blood work values were explained as well.  The patient was counseled on using a low sugar and carbohydrate diet.  Patient was advised to eat small meals high in protein and to exercise regularly. Patient as advised to take all medications as prescribed, compliance with medications was reinforced.  Patient should follow up with yearly podiatry and opthalmology visits. Patient was advised to call office  or go to the ER immediately if experiences any nausea, lightheadedness or for any other issues.\par a1c is 6.9, has not been consistent, stopped metformin due to stomach issues, then sugars went up\par however metformin gives stomach issues, severe diarrhea\par is on tradjenta, on that his sugars went up, however cannot tolerate metformin\par will try adding trulicity and reassess a1c is 2 months, stay on tradjenta for now and reassess\par \par cholesterol\par The diagnosis of high cholesterol is established and known to the patient and provider.  Previous blood work was evaluated at time of visit and reviewed with the patient. The patient was counseled on a low cholesterol diet and on medication compliance. Patient was advised to generally limit foods fried in oil, high in saturated fat, cheese, eggs and red meat. Patient was advised to continue on current medications and to followup in office for necessary blood work in three months.\par will monitor\par \par increase to daily and reassess\par \par low vitamin d \par will give supplement\par \par leukocyotsis \par saw hematologist, cleared

## 2021-12-03 ENCOUNTER — APPOINTMENT (OUTPATIENT)
Dept: NEUROSURGERY | Facility: CLINIC | Age: 41
End: 2021-12-03
Payer: COMMERCIAL

## 2021-12-03 VITALS
BODY MASS INDEX: 32.64 KG/M2 | HEART RATE: 92 BPM | SYSTOLIC BLOOD PRESSURE: 118 MMHG | HEIGHT: 70 IN | OXYGEN SATURATION: 96 % | WEIGHT: 228 LBS | DIASTOLIC BLOOD PRESSURE: 86 MMHG

## 2021-12-03 PROCEDURE — 99212 OFFICE O/P EST SF 10 MIN: CPT

## 2021-12-03 NOTE — ASSESSMENT
[FreeTextEntry1] : Pt with one month history of intrascapular thoracic pain but otherwise doing well.  Advil helps better than tylenol.\par We agree to await a month to see if this resolves on its own.  If not, will image thoracic and lumbar spine.\par \par Return in one month.\par Advise caution with Advil.\par Can take muscle relaxant that he already has as needed.\par \par Time spent 15 minutes

## 2021-12-03 NOTE — PHYSICAL EXAM
[FreeTextEntry1] : Pt alert, awake, in NAD.  MATSON well with 5/5 motor.  Wound intact and well-healed.  \par Gait is normal.  No spasm or tenderness of lumbar or thoracic spine.

## 2021-12-03 NOTE — HISTORY OF PRESENT ILLNESS
[FreeTextEntry1] : Braydon Corral is a pleasant right handed 39 year old gentleman who is s/p bilateral L5-S1 TLIF's with PLIF with pedicle screw fixation; intraoperative CSF leak with primary repair on 1/22/2019. Last seen in this office on 6/29/20 and reviewed CT LS spine and advised to continue PT. \par \par Pt today is doing ok, but noted some pain in the right upper back past few weeks, constantly hurting except when lay down.  Lower back is doing ok.  Not sure if have to imaging.  Pain is between shoulder blades.  Thought he may have slept badly, but when it did not go away, then he was not sure.  Pain level right now is a 3 which does not stop him from doing anything.  He stopped taking metformin, and now taking Trulicity and Tradjenta for DM.\par Does not take pain meds, but occasionally takes 2 tyelenol or 2 advil, but it does affect bleeding when he does finger stick.  No new medical problems since last visit in June 2020.\par \par

## 2021-12-24 ENCOUNTER — EMERGENCY (EMERGENCY)
Facility: HOSPITAL | Age: 41
LOS: 1 days | Discharge: HOME | End: 2021-12-24
Attending: EMERGENCY MEDICINE
Payer: COMMERCIAL

## 2021-12-24 VITALS
RESPIRATION RATE: 18 BRPM | OXYGEN SATURATION: 97 % | DIASTOLIC BLOOD PRESSURE: 88 MMHG | SYSTOLIC BLOOD PRESSURE: 126 MMHG | WEIGHT: 235.23 LBS | HEART RATE: 92 BPM | HEIGHT: 68 IN | TEMPERATURE: 99 F

## 2021-12-24 DIAGNOSIS — Z98.890 OTHER SPECIFIED POSTPROCEDURAL STATES: Chronic | ICD-10-CM

## 2021-12-24 DIAGNOSIS — L72.9 FOLLICULAR CYST OF THE SKIN AND SUBCUTANEOUS TISSUE, UNSPECIFIED: Chronic | ICD-10-CM

## 2021-12-24 PROCEDURE — 99283 EMERGENCY DEPT VISIT LOW MDM: CPT

## 2021-12-24 PROCEDURE — 99282 EMERGENCY DEPT VISIT SF MDM: CPT

## 2021-12-24 NOTE — ED ADULT TRIAGE NOTE - CHIEF COMPLAINT QUOTE
PT REPORTS RIGHT KNEE PAIN SINCE LAST NIGHT RADIATING DOWN RIGHT LEG AND NUMBNESS TO RIGHT TOES SINCE 10 AM

## 2021-12-24 NOTE — ED PROVIDER NOTE - PATIENT PORTAL LINK FT
You can access the FollowMyHealth Patient Portal offered by [facility] by registering at the following website: [email or web address]. By joining [employer]’s FollowMyHealth portal, you will also be able to view your health information using other applications (apps) compatible with our system.

## 2021-12-24 NOTE — ED PROVIDER NOTE - OBJECTIVE STATEMENT
42 y/o M with significant PSHx of spinal surgery in 2019 presents to the ED with 40 y/o M with significant PSHx of spinal surgery in 2019 presents to the ED with muscular pain, strain, and spasm like sensation to the back of R thigh and tingling to the toes of R foot. Patient was worried that this might be a stroke and took some aspirin yesterday. Today, patient went to urgent care but because the line was too long decided to come to the ER just to be safe. Denies back pain or any other acute complaints.

## 2021-12-24 NOTE — ED PROVIDER NOTE - CLINICAL SUMMARY MEDICAL DECISION MAKING FREE TEXT BOX
42 y/o M presents with straining sensation to R thigh and tingling in toes. Possibly sciatica. Presentation is however not consistent with stroke. Patient will follow up with own neurologist. Declined pain medication. 42 y/o M presents with straining sensation to R thigh and tingling in toes suggestive of sciatica. Presentation is however not consistent with stroke. Patient will follow up with own neurologist. Declined pain medication.

## 2021-12-24 NOTE — ED PROVIDER NOTE - MUSCULOSKELETAL MINIMAL EXAM
no leg, thigh, knee or calf tenderness to palpation, no swelling or bruising, sensation intact, subjective tingling to R foot toes, 5/5 strength on flexion and extension of toes, ankle and knee

## 2021-12-24 NOTE — ED PROVIDER NOTE - NSFOLLOWUPINSTRUCTIONS_ED_ALL_ED_FT
Sciatica    WHAT YOU NEED TO KNOW:    Sciatica is a condition that causes pain along your sciatic nerve. The sciatic nerve runs from your spine through both sides of your buttocks. It then runs down the back of your thigh, into your lower leg and foot. Your sciatic nerve may be compressed, inflamed, irritated, or stretched.    DISCHARGE INSTRUCTIONS:    Medicines:   •NSAIDs: These medicines decrease swelling and pain. NSAIDs are available without a doctor's order. Ask your healthcare provider which medicine is right for you. Ask how much to take and when to take it. Take as directed. NSAIDs can cause stomach bleeding or kidney problems if not taken correctly.      •Acetaminophen: This medicine decreases pain. Acetaminophen is available without a doctor's order. Ask how much to take and when to take it. Follow directions. Acetaminophen can cause liver damage if not taken correctly.      •Muscle relaxers help decrease pain and muscle spasms.      •Take your medicine as directed. Contact your healthcare provider if you think your medicine is not helping or if you have side effects. Tell him or her if you are allergic to any medicine. Keep a list of the medicines, vitamins, and herbs you take. Include the amounts, and when and why you take them. Bring the list or the pill bottles to follow-up visits. Carry your medicine list with you in case of an emergency.      Follow up with your doctor as directed: Write down your questions so you remember to ask them during your visits.     Manage your symptoms:   •Activity: Decrease your activity. Do not lift heavy objects or twist your back for at least 6 weeks. Slowly return to your usual activity.      •Ice: Ice helps decrease swelling and pain. Ice may also help prevent tissue damage. Use an ice pack, or put crushed ice in a plastic bag. Cover it with a towel and place it on your low back or leg for 15 to 20 minutes every hour or as directed.      •Heat: Heat helps decrease pain and muscle spasms. Apply heat on the area for 20 to 30 minutes every 2 hours for as many days as directed.       •Physical therapy: You may need to see a physical therapist to teach you exercises to help improve movement and strength, and to decrease pain. An occupational therapist teaches you skills to help with your daily activities.       •Use assistive devices if directed: You may need to wear back support, such as a back brace. You may need crutches, a cane, or a walker to decrease stress on your lower back and leg muscles. Ask your healthcare provider for more information about assistive devices and how to use them correctly.      Self-care:   •Avoid pressure on your back and legs: Do not lift heavy objects, or stand or sit for long periods of time.      •Lift objects safely: Keep your back straight and bend your knees when you  an object. Do not bend or twist your back when you lift.      •Maintain a healthy weight: Ask your healthcare provider how much you should weigh. Ask him to help you create a weight loss plan if you are overweight.       •Exercise: Ask your healthcare provider about the best stretching, warmup, and exercise plan for you.       Contact your healthcare provider if:   •You have pain in your lower back at night or when resting.      •You have pain in your lower back with numbness below the knee.      •You have weakness in one leg only.      •You have questions or concerns about your condition or care.      Seek care immediately or call 911 if:   •You have trouble holding back your urine or bowel movements.      •You have weakness in both legs.      •You have numbness in your groin or buttocks.

## 2022-01-14 ENCOUNTER — APPOINTMENT (OUTPATIENT)
Dept: NEUROSURGERY | Facility: CLINIC | Age: 42
End: 2022-01-14
Payer: COMMERCIAL

## 2022-01-14 PROCEDURE — 99212 OFFICE O/P EST SF 10 MIN: CPT | Mod: 95

## 2022-01-19 NOTE — HISTORY OF PRESENT ILLNESS
[FreeTextEntry1] : Braydon Corral is a pleasant right handed 39 year old gentleman who is s/p bilateral L5-S1 TLIF's with PLIF with pedicle screw fixation; intraoperative CSF leak with primary repair on 1/22/2019. Last seen in this office on 12/3/21 with parascapular thoracic  pain and was  advised to continue conservative management.\par \par Pt today doing a TEB for follow up. He noted his pain gets better with Methocarbamol and other NSAIDs.  Lower back is doing ok.Pain is between shoulder blades.  Pain level right now is a 3 which does not stop him from doing anything. He stopped taking metformin, and now taking Trulicity and Tradjenta for DM.Pt working from home and his wife expecting a baby in few months. He wanted to be ready for carrying baby and wants to do this follow up\par \par \par

## 2022-01-19 NOTE — REASON FOR VISIT
[Follow-Up: _____] : a [unfilled] follow-up visit [Family Member] : family member [FreeTextEntry1] : upper back pain

## 2022-01-20 ENCOUNTER — OUTPATIENT (OUTPATIENT)
Dept: OUTPATIENT SERVICES | Facility: HOSPITAL | Age: 42
LOS: 1 days | End: 2022-01-20

## 2022-01-20 DIAGNOSIS — Z98.890 OTHER SPECIFIED POSTPROCEDURAL STATES: Chronic | ICD-10-CM

## 2022-01-20 DIAGNOSIS — Z20.822 CONTACT WITH AND (SUSPECTED) EXPOSURE TO COVID-19: ICD-10-CM

## 2022-01-20 DIAGNOSIS — L72.9 FOLLICULAR CYST OF THE SKIN AND SUBCUTANEOUS TISSUE, UNSPECIFIED: Chronic | ICD-10-CM

## 2022-01-20 LAB
ALBUMIN SERPL ELPH-MCNC: 4.7 G/DL
ALP BLD-CCNC: 78 U/L
ALT SERPL-CCNC: 28 U/L
ANION GAP SERPL CALC-SCNC: 13 MMOL/L
AST SERPL-CCNC: 15 U/L
BASOPHILS # BLD AUTO: 0.13 K/UL
BASOPHILS NFR BLD AUTO: 0.8 %
BILIRUB SERPL-MCNC: 0.4 MG/DL
BUN SERPL-MCNC: 10 MG/DL
CALCIUM SERPL-MCNC: 9.9 MG/DL
CHLORIDE SERPL-SCNC: 100 MMOL/L
CHOLEST SERPL-MCNC: 231 MG/DL
CO2 SERPL-SCNC: 26 MMOL/L
CREAT SERPL-MCNC: 1.18 MG/DL
EOSINOPHIL # BLD AUTO: 0.39 K/UL
EOSINOPHIL NFR BLD AUTO: 2.5 %
GLUCOSE SERPL-MCNC: 148 MG/DL
HCT VFR BLD CALC: 48.9 %
HDLC SERPL-MCNC: 38 MG/DL
HGB BLD-MCNC: 16.2 G/DL
IMM GRANULOCYTES NFR BLD AUTO: 0.5 %
LDLC SERPL CALC-MCNC: 131 MG/DL
LYMPHOCYTES # BLD AUTO: 3.97 K/UL
LYMPHOCYTES NFR BLD AUTO: 25.8 %
MAN DIFF?: NORMAL
MCHC RBC-ENTMCNC: 26.6 PG
MCHC RBC-ENTMCNC: 33.1 GM/DL
MCV RBC AUTO: 80.4 FL
MONOCYTES # BLD AUTO: 0.88 K/UL
MONOCYTES NFR BLD AUTO: 5.7 %
NEUTROPHILS # BLD AUTO: 9.96 K/UL
NEUTROPHILS NFR BLD AUTO: 64.7 %
NONHDLC SERPL-MCNC: 193 MG/DL
PLATELET # BLD AUTO: 395 K/UL
POTASSIUM SERPL-SCNC: 4.7 MMOL/L
PROT SERPL-MCNC: 7.4 G/DL
RBC # BLD: 6.08 M/UL
RBC # FLD: 14.1 %
SARS-COV-2 RNA SPEC QL NAA+PROBE: SIGNIFICANT CHANGE UP
SODIUM SERPL-SCNC: 139 MMOL/L
TRIGL SERPL-MCNC: 308 MG/DL
TSH SERPL-ACNC: 1.63 UIU/ML
WBC # FLD AUTO: 15.4 K/UL

## 2022-01-28 ENCOUNTER — APPOINTMENT (OUTPATIENT)
Dept: FAMILY MEDICINE | Facility: CLINIC | Age: 42
End: 2022-01-28

## 2022-02-07 ENCOUNTER — APPOINTMENT (OUTPATIENT)
Dept: FAMILY MEDICINE | Facility: CLINIC | Age: 42
End: 2022-02-07
Payer: COMMERCIAL

## 2022-02-07 ENCOUNTER — APPOINTMENT (OUTPATIENT)
Dept: FAMILY MEDICINE | Facility: CLINIC | Age: 42
End: 2022-02-07

## 2022-02-07 VITALS
SYSTOLIC BLOOD PRESSURE: 120 MMHG | OXYGEN SATURATION: 98 % | DIASTOLIC BLOOD PRESSURE: 84 MMHG | HEIGHT: 70 IN | HEART RATE: 88 BPM | BODY MASS INDEX: 32.64 KG/M2 | WEIGHT: 228 LBS

## 2022-02-07 LAB — HBA1C MFR BLD HPLC: 7.1

## 2022-02-07 PROCEDURE — 99214 OFFICE O/P EST MOD 30 MIN: CPT | Mod: 25

## 2022-02-07 PROCEDURE — 83036 HEMOGLOBIN GLYCOSYLATED A1C: CPT | Mod: QW

## 2022-02-07 RX ORDER — DULAGLUTIDE 0.75 MG/.5ML
0.75 INJECTION, SOLUTION SUBCUTANEOUS
Qty: 12 | Refills: 1 | Status: DISCONTINUED | COMMUNITY
Start: 2021-11-23 | End: 2022-02-07

## 2022-02-07 NOTE — HEALTH RISK ASSESSMENT
[Never] : Never [No] : No [0] : 2) Feeling down, depressed, or hopeless: Not at all (0) [PHQ-2 Negative - No further assessment needed] : PHQ-2 Negative - No further assessment needed [[id number]] : 0 [Excellent] : ~his/her~  mood as  excellent [With Significant Other] : lives with significant other [Employed] : employed [] :  [Fully functional (bathing, dressing, toileting, transferring, walking, feeding)] : Fully functional (bathing, dressing, toileting, transferring, walking, feeding) [Fully functional (using the telephone, shopping, preparing meals, housekeeping, doing laundry, using] : Fully functional and needs no help or supervision to perform IADLs (using the telephone, shopping, preparing meals, housekeeping, doing laundry, using transportation, managing medications and managing finances) [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

## 2022-02-07 NOTE — ASSESSMENT
[FreeTextEntry1] : spinal fusion of L4-L5\par \par diabetes\par The diagnosis of diabetes is established with this patient. Previous blood work was reviewed prior to the visit and with the patient at time of visit.  Blood work values were explained as well.  The patient was counseled on using a low sugar and carbohydrate diet.  Patient was advised to eat small meals high in protein and to exercise regularly. Patient as advised to take all medications as prescribed, compliance with medications was reinforced.  Patient should follow up with yearly podiatry and opthalmology visits. Patient was advised to call office  or go to the ER immediately if experiences any nausea, lightheadedness or for any other issues.\par a1c is 6.9, has not been consistent, stopped metformin due to stomach issues, then sugars went up\par however metformin gives stomach issues, severe diarrhea\par is on tradjenta, on that his sugars went up, however cannot tolerate metformin\par 11/21 will try adding trulicity and reassess a1c is 2 months, stay on tradjenta for now and reassess\par 2/7/22 - A1C is 7.1 - could not tolerate trulicity due to rashes, will try ozempic, still cannot tolerate metformin \par \par cholesterol\par The diagnosis of high cholesterol is established and known to the patient and provider.  Previous blood work was evaluated at time of visit and reviewed with the patient. The patient was counseled on a low cholesterol diet and on medication compliance. Patient was advised to generally limit foods fried in oil, high in saturated fat, cheese, eggs and red meat. Patient was advised to continue on current medications and to followup in office for necessary blood work in three months.\par reviewed labs in real time, start atorvastatin 10\par \par low vitamin d \par will give supplement\par \par leukocyotsis \par saw hematologist, cleared

## 2022-02-07 NOTE — HISTORY OF PRESENT ILLNESS
[de-identified] : 41 year old male is here for a followup visit. Patient is here for medication renewals and for blood work discussion. Medications and allergies were reviewed and assessed.  There has been no new medications since the last visit. Patient is feeling well with no active changes or issues since His last visit.\par

## 2022-04-27 ENCOUNTER — RX RENEWAL (OUTPATIENT)
Age: 42
End: 2022-04-27

## 2022-05-02 ENCOUNTER — APPOINTMENT (OUTPATIENT)
Dept: FAMILY MEDICINE | Facility: CLINIC | Age: 42
End: 2022-05-02

## 2022-05-05 LAB
25(OH)D3 SERPL-MCNC: 47.1 NG/ML
ALBUMIN SERPL ELPH-MCNC: 4.3 G/DL
ALP BLD-CCNC: 78 U/L
ALT SERPL-CCNC: 26 U/L
ANION GAP SERPL CALC-SCNC: 14 MMOL/L
AST SERPL-CCNC: 14 U/L
BILIRUB SERPL-MCNC: 0.5 MG/DL
BUN SERPL-MCNC: 9 MG/DL
CALCIUM SERPL-MCNC: 9.7 MG/DL
CHLORIDE SERPL-SCNC: 103 MMOL/L
CHOLEST SERPL-MCNC: 128 MG/DL
CO2 SERPL-SCNC: 24 MMOL/L
CREAT SERPL-MCNC: 1.13 MG/DL
EGFR: 84 ML/MIN/1.73M2
ESTIMATED AVERAGE GLUCOSE: 148 MG/DL
GLUCOSE SERPL-MCNC: 142 MG/DL
HBA1C MFR BLD HPLC: 6.8 %
HDLC SERPL-MCNC: 34 MG/DL
LDLC SERPL CALC-MCNC: 59 MG/DL
NONHDLC SERPL-MCNC: 94 MG/DL
POTASSIUM SERPL-SCNC: 4.9 MMOL/L
PROT SERPL-MCNC: 6.7 G/DL
SODIUM SERPL-SCNC: 140 MMOL/L
TRIGL SERPL-MCNC: 178 MG/DL

## 2022-05-10 ENCOUNTER — APPOINTMENT (OUTPATIENT)
Dept: FAMILY MEDICINE | Facility: CLINIC | Age: 42
End: 2022-05-10
Payer: COMMERCIAL

## 2022-05-10 PROCEDURE — 99214 OFFICE O/P EST MOD 30 MIN: CPT | Mod: 95

## 2022-05-10 RX ORDER — LINAGLIPTIN 5 MG/1
5 TABLET, FILM COATED ORAL
Qty: 90 | Refills: 1 | Status: DISCONTINUED | COMMUNITY
Start: 2021-09-28 | End: 2022-05-10

## 2022-05-10 RX ORDER — METHOCARBAMOL 500 MG/1
500 TABLET, FILM COATED ORAL
Qty: 90 | Refills: 1 | Status: DISCONTINUED | COMMUNITY
Start: 2022-01-14 | End: 2022-05-10

## 2022-05-10 NOTE — HISTORY OF PRESENT ILLNESS
[de-identified] : 41 year old male is here for a followup visit. Patient is here for medication renewals and for blood work discussion. Medications and allergies were reviewed and assessed.  There has been no new medications since the last visit. Patient is feeling well with no active changes or issues since His last visit.\par

## 2022-05-10 NOTE — ASSESSMENT
[FreeTextEntry1] : spinal fusion of L4-L5\par \par upper back pain\par feels pain in upper thoracic pain\par tried advil which helped\par \par \par diabetes\par The diagnosis of diabetes is established with this patient. Previous blood work was reviewed prior to the visit and with the patient at time of visit.  Blood work values were explained as well.  The patient was counseled on using a low sugar and carbohydrate diet.  Patient was advised to eat small meals high in protein and to exercise regularly. Patient as advised to take all medications as prescribed, compliance with medications was reinforced.  Patient should follow up with yearly podiatry and opthalmology visits. Patient was advised to call office  or go to the ER immediately if experiences any nausea, lightheadedness or for any other issues.\par a1c is 6.9, has not been consistent, stopped metformin due to stomach issues, then sugars went up\par however metformin gives stomach issues, severe diarrhea\par is on tradjenta, on that his sugars went up, however cannot tolerate metformin\par 11/21 will try adding trulicity and reassess a1c is 2 months, stay on tradjenta for now and reassess\par 2/7/22 - A1C is 7.1 - could not tolerate trulicity due to rashes, will try ozempic, still cannot tolerate metformin \par 5/10/22  ozempic doing great for patient, no side affects, feels he is losing weight even though has not weighed himself, will stop tradgenta and increase ozempic to 1 mg and reassess in 3 months\par \par cholesterol\par The diagnosis of high cholesterol is established and known to the patient and provider.  Previous blood work was evaluated at time of visit and reviewed with the patient. The patient was counseled on a low cholesterol diet and on medication compliance. Patient was advised to generally limit foods fried in oil, high in saturated fat, cheese, eggs and red meat. Patient was advised to continue on current medications and to followup in office for necessary blood work in three months.\par reviewed labs in real time, start atorvastatin 10\par 5/10/22  great improvement in nubmers, reviewed with patient, continue\par \par low vitamin d \par will give supplement\par 5/10 improved, continue\par \par leukocyotsis \par saw hematologist, cleared

## 2022-05-10 NOTE — HEALTH RISK ASSESSMENT
[Never] : Never [No] : No [0] : 2) Feeling down, depressed, or hopeless: Not at all (0) [PHQ-2 Negative - No further assessment needed] : PHQ-2 Negative - No further assessment needed [[id number]] : 0 [Excellent] : ~his/her~  mood as  excellent [With Significant Other] : lives with significant other [Employed] : employed [] :  [Fully functional (bathing, dressing, toileting, transferring, walking, feeding)] : Fully functional (bathing, dressing, toileting, transferring, walking, feeding) [Fully functional (using the telephone, shopping, preparing meals, housekeeping, doing laundry, using] : Fully functional and needs no help or supervision to perform IADLs (using the telephone, shopping, preparing meals, housekeeping, doing laundry, using transportation, managing medications and managing finances) [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

## 2022-08-05 ENCOUNTER — EMERGENCY (EMERGENCY)
Facility: HOSPITAL | Age: 42
LOS: 1 days | Discharge: HOME | End: 2022-08-05
Attending: EMERGENCY MEDICINE
Payer: COMMERCIAL

## 2022-08-05 VITALS
DIASTOLIC BLOOD PRESSURE: 83 MMHG | OXYGEN SATURATION: 98 % | SYSTOLIC BLOOD PRESSURE: 146 MMHG | WEIGHT: 225.09 LBS | HEIGHT: 68 IN | TEMPERATURE: 98 F | RESPIRATION RATE: 16 BRPM | HEART RATE: 119 BPM

## 2022-08-05 DIAGNOSIS — L72.9 FOLLICULAR CYST OF THE SKIN AND SUBCUTANEOUS TISSUE, UNSPECIFIED: Chronic | ICD-10-CM

## 2022-08-05 DIAGNOSIS — Z98.890 OTHER SPECIFIED POSTPROCEDURAL STATES: Chronic | ICD-10-CM

## 2022-08-05 LAB
ALBUMIN SERPL ELPH-MCNC: 3.4 G/DL — LOW (ref 3.5–5)
ALP SERPL-CCNC: 72 U/L — SIGNIFICANT CHANGE UP (ref 40–120)
ALT FLD-CCNC: 30 U/L DA — SIGNIFICANT CHANGE UP (ref 10–60)
ANION GAP SERPL CALC-SCNC: 6 MMOL/L — SIGNIFICANT CHANGE UP (ref 5–17)
AST SERPL-CCNC: 9 U/L — LOW (ref 10–40)
BASOPHILS # BLD AUTO: 0.04 K/UL — SIGNIFICANT CHANGE UP (ref 0–0.2)
BASOPHILS NFR BLD AUTO: 0.2 % — SIGNIFICANT CHANGE UP (ref 0–2)
BILIRUB SERPL-MCNC: 0.8 MG/DL — SIGNIFICANT CHANGE UP (ref 0.2–1.2)
BUN SERPL-MCNC: 11 MG/DL — SIGNIFICANT CHANGE UP (ref 7–18)
CALCIUM SERPL-MCNC: 9.3 MG/DL — SIGNIFICANT CHANGE UP (ref 8.4–10.5)
CHLORIDE SERPL-SCNC: 105 MMOL/L — SIGNIFICANT CHANGE UP (ref 96–108)
CO2 SERPL-SCNC: 26 MMOL/L — SIGNIFICANT CHANGE UP (ref 22–31)
CREAT SERPL-MCNC: 1.31 MG/DL — HIGH (ref 0.5–1.3)
EGFR: 71 ML/MIN/1.73M2 — SIGNIFICANT CHANGE UP
EOSINOPHIL # BLD AUTO: 0.56 K/UL — HIGH (ref 0–0.5)
EOSINOPHIL NFR BLD AUTO: 2.5 % — SIGNIFICANT CHANGE UP (ref 0–6)
GLUCOSE SERPL-MCNC: 187 MG/DL — HIGH (ref 70–99)
HCT VFR BLD CALC: 49.9 % — SIGNIFICANT CHANGE UP (ref 39–50)
HGB BLD-MCNC: 17.2 G/DL — HIGH (ref 13–17)
HIV 1 & 2 AB SERPL IA.RAPID: SIGNIFICANT CHANGE UP
IMM GRANULOCYTES NFR BLD AUTO: 0.6 % — SIGNIFICANT CHANGE UP (ref 0–1.5)
LIDOCAIN IGE QN: 159 U/L — SIGNIFICANT CHANGE UP (ref 73–393)
LYMPHOCYTES # BLD AUTO: 11.2 % — LOW (ref 13–44)
LYMPHOCYTES # BLD AUTO: 2.46 K/UL — SIGNIFICANT CHANGE UP (ref 1–3.3)
MCHC RBC-ENTMCNC: 27.4 PG — SIGNIFICANT CHANGE UP (ref 27–34)
MCHC RBC-ENTMCNC: 34.5 GM/DL — SIGNIFICANT CHANGE UP (ref 32–36)
MCV RBC AUTO: 79.5 FL — LOW (ref 80–100)
MONOCYTES # BLD AUTO: 1.29 K/UL — HIGH (ref 0–0.9)
MONOCYTES NFR BLD AUTO: 5.9 % — SIGNIFICANT CHANGE UP (ref 2–14)
NEUTROPHILS # BLD AUTO: 17.48 K/UL — HIGH (ref 1.8–7.4)
NEUTROPHILS NFR BLD AUTO: 79.6 % — HIGH (ref 43–77)
NRBC # BLD: 0 /100 WBCS — SIGNIFICANT CHANGE UP (ref 0–0)
PLATELET # BLD AUTO: 391 K/UL — SIGNIFICANT CHANGE UP (ref 150–400)
POTASSIUM SERPL-MCNC: 4.3 MMOL/L — SIGNIFICANT CHANGE UP (ref 3.5–5.3)
POTASSIUM SERPL-SCNC: 4.3 MMOL/L — SIGNIFICANT CHANGE UP (ref 3.5–5.3)
PROT SERPL-MCNC: 7.3 G/DL — SIGNIFICANT CHANGE UP (ref 6–8.3)
RBC # BLD: 6.28 M/UL — HIGH (ref 4.2–5.8)
RBC # FLD: 13.8 % — SIGNIFICANT CHANGE UP (ref 10.3–14.5)
SARS-COV-2 RNA SPEC QL NAA+PROBE: SIGNIFICANT CHANGE UP
SODIUM SERPL-SCNC: 137 MMOL/L — SIGNIFICANT CHANGE UP (ref 135–145)
WBC # BLD: 21.97 K/UL — HIGH (ref 3.8–10.5)
WBC # FLD AUTO: 21.97 K/UL — HIGH (ref 3.8–10.5)

## 2022-08-05 PROCEDURE — 76705 ECHO EXAM OF ABDOMEN: CPT | Mod: 26

## 2022-08-05 PROCEDURE — 99285 EMERGENCY DEPT VISIT HI MDM: CPT

## 2022-08-05 RX ORDER — MORPHINE SULFATE 50 MG/1
4 CAPSULE, EXTENDED RELEASE ORAL ONCE
Refills: 0 | Status: DISCONTINUED | OUTPATIENT
Start: 2022-08-05 | End: 2022-08-05

## 2022-08-05 RX ORDER — HYOSCYAMINE SULFATE 0.13 MG
0.12 TABLET ORAL ONCE
Refills: 0 | Status: COMPLETED | OUTPATIENT
Start: 2022-08-05 | End: 2022-08-05

## 2022-08-05 RX ORDER — PIPERACILLIN AND TAZOBACTAM 4; .5 G/20ML; G/20ML
3.38 INJECTION, POWDER, LYOPHILIZED, FOR SOLUTION INTRAVENOUS ONCE
Refills: 0 | Status: COMPLETED | OUTPATIENT
Start: 2022-08-05 | End: 2022-08-05

## 2022-08-05 RX ORDER — ONDANSETRON 8 MG/1
4 TABLET, FILM COATED ORAL
Qty: 12 | Refills: 0
Start: 2022-08-05

## 2022-08-05 RX ORDER — ONDANSETRON 8 MG/1
4 TABLET, FILM COATED ORAL ONCE
Refills: 0 | Status: COMPLETED | OUTPATIENT
Start: 2022-08-05 | End: 2022-08-05

## 2022-08-05 RX ORDER — PANTOPRAZOLE SODIUM 20 MG/1
40 TABLET, DELAYED RELEASE ORAL ONCE
Refills: 0 | Status: COMPLETED | OUTPATIENT
Start: 2022-08-05 | End: 2022-08-05

## 2022-08-05 RX ORDER — SODIUM CHLORIDE 9 MG/ML
3200 INJECTION INTRAMUSCULAR; INTRAVENOUS; SUBCUTANEOUS ONCE
Refills: 0 | Status: COMPLETED | OUTPATIENT
Start: 2022-08-05 | End: 2022-08-05

## 2022-08-05 RX ADMIN — MORPHINE SULFATE 4 MILLIGRAM(S): 50 CAPSULE, EXTENDED RELEASE ORAL at 21:41

## 2022-08-05 RX ADMIN — ONDANSETRON 4 MILLIGRAM(S): 8 TABLET, FILM COATED ORAL at 20:28

## 2022-08-05 RX ADMIN — MORPHINE SULFATE 4 MILLIGRAM(S): 50 CAPSULE, EXTENDED RELEASE ORAL at 21:11

## 2022-08-05 RX ADMIN — PANTOPRAZOLE SODIUM 40 MILLIGRAM(S): 20 TABLET, DELAYED RELEASE ORAL at 22:13

## 2022-08-05 RX ADMIN — PIPERACILLIN AND TAZOBACTAM 200 GRAM(S): 4; .5 INJECTION, POWDER, LYOPHILIZED, FOR SOLUTION INTRAVENOUS at 21:11

## 2022-08-05 RX ADMIN — SODIUM CHLORIDE 3200 MILLILITER(S): 9 INJECTION INTRAMUSCULAR; INTRAVENOUS; SUBCUTANEOUS at 20:26

## 2022-08-05 RX ADMIN — SODIUM CHLORIDE 3200 MILLILITER(S): 9 INJECTION INTRAMUSCULAR; INTRAVENOUS; SUBCUTANEOUS at 21:58

## 2022-08-05 RX ADMIN — PIPERACILLIN AND TAZOBACTAM 3.38 GRAM(S): 4; .5 INJECTION, POWDER, LYOPHILIZED, FOR SOLUTION INTRAVENOUS at 21:58
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
## 2022-08-05 NOTE — ED PROVIDER NOTE - PATIENT PORTAL LINK FT
You can access the FollowMyHealth Patient Portal offered by [facility] by registering at the following website: [email or web address]. By joining [facility]’s FollowMyHealth portal, you will also be able to view your health information using other applications (apps) compatible with our system.

## 2022-08-05 NOTE — ED PROVIDER NOTE - NSICDXPASTMEDICALHX_GEN_ALL_CORE_FT
PAST MEDICAL HISTORY:  Disc displacement, lumbar     H/O pilonidal cyst     RBC microcytosis     Sickle cell trait     T2DM (type 2 diabetes mellitus)     TIA (transient ischemic attack) possible, ~2012

## 2022-08-05 NOTE — ED PROVIDER NOTE - OBJECTIVE STATEMENT
42 year old male with no PMHx is presenting to the ED with chief complaint of abdominal pain since Tuesday. Patient states that his abdominal pain comes and goes and on Tuesday he was nausea, vomiting and had diarrhea. Today his pain got so much worse that patient decided to come to the hospital. He states that the pain is sharp and that it is still currently hurting. ILDA

## 2022-08-05 NOTE — ED ADULT NURSE NOTE - NSFALLRSKUNASSIST_ED_ALL_ED
ED Provider Note      Patient : Juanjo Dhaliwal Age: 29 year old Sex: male   MRN: [id number] Encounter Date: 12/21/2020      History       Chief Complaint   Patient presents with   • Finger Pain     Patient information was obtained from patient.  History/Exam limitations: none.  Patient presented to the Emergency Department via private auto     HPI: 29 year old male with a history of no chronic medical conditions presents with the chief complaint of left index finger pain and swelling.  This is located near the tip of his finger.  He was seen at an urgent care approximately 3 days ago and diagnosed with a paronychia.  Patient states he cannot afford to pay for the incision and drainage procedure that they offered him.  He was started on an antibiotic.  He has had continued pain swelling and discomfort so he return to the emergency department for reevaluation.  He is requesting incision and drainage.  No other injury or complaints.  He is able to move his finger without difficulty.  No history of fever or chills.  No history of hand pain, wrist pain or other complaints this time.    Past medical history: denies any chronic medical problems  Allergies denies any medication allergies  Surgical history denies any surgical history  Social history admits to social marijuana use.  Admits he is currently unemployed    No Known Allergies  No past medical history on file.  No past surgical history on file.  No family history on file.  Social History     Tobacco Use   • Smoking status: Not on file   Substance Use Topics   • Alcohol use: Not on file   • Drug use: Not on file         Review of Systems      10 POINT REVIEW OF ROS REVIEWED AND NEGATIVE UNLESS MENTIONED IN THE HPI    Physical Exam     ED Triage Vitals [12/21/20 0735]   ED Triage Vitals Group      Temp 98.2 °F (36.8 °C)      Heart Rate 78      Resp 14      /81      SpO2 98 %      EtCO2 mmHg       Height       Weight 156 lb 12 oz (71.1 kg)      Weight Scale  Used Standing scale      BMI (Calculated)       IBW/kg (Calculated)      Visit Vitals  /81   Pulse 78   Temp 98.2 °F (36.8 °C)   Resp 14   Wt 71.1 kg (156 lb 12 oz)   SpO2 98%           Physical Exam:      CONSTITUTIONAL:  vital signs reviewed in nurses notes.  Patient is alert, well developed, well nourished,  normal grooming, communicating normally, and well appearing.      HEAD: Normocephalic, atraumatic, without any swelling, lesions, masses.      EYES: PERRL, EOMI, Normal conjunctivae and eyelids. No sclera icterus/injection.    ENT: external ears, nose, lips are normal.  Frontal Dentition is normal    NECK: supple, no meningeal signs.  Trachea is midline, no thyromegally, no masses.    CARDIOVASCULAR:  RRR, normal S1,S2.  No murmurs, rub, gallops.  No JVD. No leg edema, no signs of DVT.      PULMONARY:  Breathing is normal and non-labored with normal chest expansion.  No tachypnea. Lungs are clear to auscultation, without wheezes, rhonchi, rales.  No retractions, no accessory muscles use.      ABDOMEN: BS+ normoactive, soft, non-tender, non distended.  No hepatomegaly. No rebound/rigidity/guarding. No flank tenderness.      MUSCULOSKELETAL:   range of motion is normal; able to flex and extend the fingers without any difficulty.  At the lateral nail fold of the left index finger there is swelling tenderness and fluctuance consistent with a paronychia.  No fusiform swelling of the digit or tenderness along the flexor tendon.  No lymphangitis.  Otherwise nontender and without any skin changes.  No skin wounds.  Radial pulse 2+ and equal bilaterally distally neurovascularly intact    INTEGUMENT: normal skin for race and age.  No rashes, lesions, nor ecchymosis. Normal turgor.    NEUROLOGICAL:  Cranial nerves 2-12 are intact.  Motor is 5/5 in both arms and legs.  Light touch sensation is normal in all extremities.      PSYCH: AO x 3 with normal mood, affect, memory, and judgment.          Lab Results     No  results found for this visit on 12/21/20.      Radiology Results     Imaging Results    None               ED Course   .Incision and Drainage    Date/Time: 12/21/2020 8:06 AM  Performed by: Allan Alcala PA-C  Authorized by: Allan Alcala PA-C     Consent:     Consent obtained:  Verbal    Consent given by:  Patient    Risks discussed:  Bleeding, damage to other organs, incomplete drainage, infection and pain    Alternatives discussed:  No treatment, delayed treatment and alternative treatment  Location:     Type:  Abscess    Location:  Upper extremity    Upper extremity location:  Finger    Finger location:  L index finger  Pre-procedure details:     Skin preparation:  Betadine  Anesthesia (see MAR for exact dosages):     Anesthesia method:  Nerve block    Block location:  Ring block; index finger    Block anesthetic:  Lidocaine 1% w/o epi    Block technique:  Ring block    Block injection procedure:  Anatomic landmarks identified, anatomic landmarks palpated, negative aspiration for blood, introduced needle and incremental injection    Block outcome:  Anesthesia achieved  Procedure type:     Complexity:  Simple  Procedure details:     Needle aspiration: no      Incision types:  Single straight    Incision depth:  Subcutaneous    Scalpel blade:  11    Wound management:  Probed and deloculated    Drainage:  Purulent    Drainage amount:  Moderate    Wound treatment:  Wound left open    Packing materials:  None  Post-procedure details:     Patient tolerance of procedure:  Tolerated well, no immediate complications            MDM:  29-year-old male presents for evaluation of finger pain and swelling  He is already been on antibiotics  Patient normal vital signs and well-appearing  On examination appears consistent with paronychia  No evidence of necrotizing fasciitis or deep tissue infection otherwise  No Systemic signs or symptoms    I offered the patient incision and drainage.  He wished to proceed with this after  discussing the risks and benefits  Incision and drainage offered moderate output of purulent fluid.  This was sent for culture  I provided him with a refill of doxycycline  I counseled him to soak the finger in warm water at least 2-3 times daily to facilitate drainage.  We discussed red flag warning signs.  I asked him to have a follow-up visit in 2 days.  I informed him that he could return to the emergency department for a wound check should he have any difficulty with follow-up secondary to insurance issues.               Clinical Impression     ED Diagnosis   1. Paronychia of finger of left hand         Disposition        Discharge 12/21/2020  8:08 AM  Juanjo Dhaliwal discharge to home/self care.              Follow-up Information     Follow up With Specialties Details Why Contact Info    [facility]  Schedule an appointment as soon as possible for a visit in 2 days For wound re-check [address] [facility] [address]  [phone]           Summary of your Discharge Medications      Take these Medications      Details   doxycycline hyclate 100 MG tablet  Commonly known as: VIBRA-TABS   Take 1 tablet by mouth 2 times daily for 5 days.                Allan Alcala PA-C   12/21/2020 8:18 AM      Dictation Disclaimer  Please note that computer voice recognition software was used for dictation in the composition of this patient's chart. While every effort is made to ensure correction of grammatical, syntax or other interpretive errors, occasional dictation errors may be missed during proofreading      I have formulated a discharge action plan for this patient:   1) i have given the patient comprehensive discharge instructions and instructed them on use of any OTC or Rx medications involved in their discharge care;   2) I have carefully and comprehensively answered any questions and addressed any concerns that the patient had regarding their  medical illness today and their discharge care and follow-up;   3) If pertinent to this case, I have discussed any possible diagnostic or clinical data that the PCP will need to follow up on as it pertains to patient's visit today in this ER;   4) I have carefully and repeatedly discussed with the patient that the patient needs to follow up with a primary care provider or specialist  5) The patient feels comfortable with going home at this time, understands the discharge action plan, and knows clearly to come back to the ER or call 911 if symptoms worsen, and to absolutely follow up as discussed above.                       Allan Alcala PA-C  12/21/20 0818     no

## 2022-08-05 NOTE — ED PROVIDER NOTE - CLINICAL SUMMARY MEDICAL DECISION MAKING FREE TEXT BOX
probably stomach virus vs cholecystis. Will get ultrasound, labs, and give meds. Reassess. probably stomach virus vs cholecystis. Will get ultrasound, labs, and give meds. Reassess. Patient meets sepsis criteria so will order blood culture and antibiotics.

## 2022-08-06 VITALS
SYSTOLIC BLOOD PRESSURE: 129 MMHG | RESPIRATION RATE: 16 BRPM | OXYGEN SATURATION: 98 % | TEMPERATURE: 98 F | HEART RATE: 100 BPM | DIASTOLIC BLOOD PRESSURE: 87 MMHG

## 2022-08-06 PROCEDURE — 87040 BLOOD CULTURE FOR BACTERIA: CPT

## 2022-08-06 PROCEDURE — 87635 SARS-COV-2 COVID-19 AMP PRB: CPT

## 2022-08-06 PROCEDURE — 96375 TX/PRO/DX INJ NEW DRUG ADDON: CPT

## 2022-08-06 PROCEDURE — 96365 THER/PROPH/DIAG IV INF INIT: CPT

## 2022-08-06 PROCEDURE — 76705 ECHO EXAM OF ABDOMEN: CPT

## 2022-08-06 PROCEDURE — 80053 COMPREHEN METABOLIC PANEL: CPT

## 2022-08-06 PROCEDURE — 99284 EMERGENCY DEPT VISIT MOD MDM: CPT | Mod: 25

## 2022-08-06 PROCEDURE — 36415 COLL VENOUS BLD VENIPUNCTURE: CPT

## 2022-08-06 PROCEDURE — 83690 ASSAY OF LIPASE: CPT

## 2022-08-06 PROCEDURE — 86703 HIV-1/HIV-2 1 RESULT ANTBDY: CPT

## 2022-08-06 PROCEDURE — 85025 COMPLETE CBC W/AUTO DIFF WBC: CPT

## 2022-08-06 RX ADMIN — Medication 0.12 MILLIGRAM(S): at 00:09

## 2022-08-10 ENCOUNTER — APPOINTMENT (OUTPATIENT)
Dept: FAMILY MEDICINE | Facility: CLINIC | Age: 42
End: 2022-08-10

## 2022-08-10 DIAGNOSIS — R10.9 UNSPECIFIED ABDOMINAL PAIN: ICD-10-CM

## 2022-08-10 PROCEDURE — 99214 OFFICE O/P EST MOD 30 MIN: CPT | Mod: 95

## 2022-08-10 RX ORDER — MELOXICAM 15 MG/1
15 TABLET ORAL
Qty: 30 | Refills: 0 | Status: DISCONTINUED | COMMUNITY
Start: 2022-05-10 | End: 2022-08-10

## 2022-08-10 RX ORDER — CYCLOBENZAPRINE HYDROCHLORIDE 10 MG/1
10 TABLET, FILM COATED ORAL
Qty: 10 | Refills: 3 | Status: DISCONTINUED | COMMUNITY
Start: 2022-05-10 | End: 2022-08-10

## 2022-08-10 NOTE — PLAN
[FreeTextEntry1] : Gastroenteritis improving. \par \par Abdominal discomfort with medications-possibly related to Ozempic.  Asked patient to hold atorvastatin day before, of and after ozempic to see if same symptoms.\par Space atorvastatin and Ozempic.  \par \par Patient not fasting.  Will go to lab for fasting bloodwork, rx given.\par \par Will adjust meds based on labs. \par Patient expressed understanding of plan.\par

## 2022-08-10 NOTE — HISTORY OF PRESENT ILLNESS
[Home] : at home, [unfilled] , at the time of the visit. [Medical Office: ([facility])___] : at the medical office located in  [Verbal consent obtained from patient] : the patient, [unfilled] [FreeTextEntry1] : Here for follow-up; needs med refills.\par  [de-identified] : Stomach was upset started beginning of week.\par Went to the ER Friday, because he had bad abdominal pain went to [facility].\par Diagnosed with Gastroenteritis; had US-showed fatty liver. \par Nausea has improved.\par \par Did not take ozempic last Friday. \par Gets upset stomach when taking atorvastatin and ozempic.  \par Metformin did not agree with patient.

## 2022-08-10 NOTE — REVIEW OF SYSTEMS
[Negative] : Genitourinary [Nausea] : no nausea [Diarrhea] : no diarrhea [Vomiting] : no vomiting [Headache] : no headache [Dizziness] : no dizziness

## 2022-08-10 NOTE — PHYSICAL EXAM
[Normal] : affect was normal and insight and judgment were intact [de-identified] : No visible respiratory distress [de-identified] : No rash

## 2022-08-24 ENCOUNTER — RX RENEWAL (OUTPATIENT)
Age: 42
End: 2022-08-24

## 2022-09-12 LAB
ALBUMIN SERPL ELPH-MCNC: 4.6 G/DL
ALP BLD-CCNC: 84 U/L
ALT SERPL-CCNC: 32 U/L
ANION GAP SERPL CALC-SCNC: 12 MMOL/L
AST SERPL-CCNC: 17 U/L
BASOPHILS # BLD AUTO: 0.11 K/UL
BASOPHILS NFR BLD AUTO: 0.8 %
BILIRUB SERPL-MCNC: 0.7 MG/DL
BUN SERPL-MCNC: 9 MG/DL
CALCIUM SERPL-MCNC: 9.9 MG/DL
CHLORIDE SERPL-SCNC: 102 MMOL/L
CHOLEST SERPL-MCNC: 140 MG/DL
CO2 SERPL-SCNC: 25 MMOL/L
CREAT SERPL-MCNC: 1.16 MG/DL
EGFR: 82 ML/MIN/1.73M2
EOSINOPHIL # BLD AUTO: 0.41 K/UL
EOSINOPHIL NFR BLD AUTO: 3 %
ESTIMATED AVERAGE GLUCOSE: 140 MG/DL
GLUCOSE SERPL-MCNC: 167 MG/DL
HBA1C MFR BLD HPLC: 6.5 %
HCT VFR BLD CALC: 51.4 %
HDLC SERPL-MCNC: 35 MG/DL
HGB BLD-MCNC: 16.8 G/DL
IMM GRANULOCYTES NFR BLD AUTO: 0.5 %
LDLC SERPL CALC-MCNC: 66 MG/DL
LYMPHOCYTES # BLD AUTO: 3.2 K/UL
LYMPHOCYTES NFR BLD AUTO: 23.5 %
MAN DIFF?: NORMAL
MCHC RBC-ENTMCNC: 26.8 PG
MCHC RBC-ENTMCNC: 32.7 GM/DL
MCV RBC AUTO: 82 FL
MONOCYTES # BLD AUTO: 0.85 K/UL
MONOCYTES NFR BLD AUTO: 6.2 %
NEUTROPHILS # BLD AUTO: 8.99 K/UL
NEUTROPHILS NFR BLD AUTO: 66 %
NONHDLC SERPL-MCNC: 105 MG/DL
PLATELET # BLD AUTO: 384 K/UL
POTASSIUM SERPL-SCNC: 4.8 MMOL/L
PROT SERPL-MCNC: 7.4 G/DL
RBC # BLD: 6.27 M/UL
RBC # FLD: 14.1 %
SODIUM SERPL-SCNC: 139 MMOL/L
TRIGL SERPL-MCNC: 192 MG/DL
TSH SERPL-ACNC: 1.32 UIU/ML
WBC # FLD AUTO: 13.63 K/UL

## 2023-01-27 ENCOUNTER — APPOINTMENT (OUTPATIENT)
Dept: MRI IMAGING | Facility: CLINIC | Age: 43
End: 2023-01-27

## 2023-01-27 ENCOUNTER — APPOINTMENT (OUTPATIENT)
Dept: CT IMAGING | Facility: CLINIC | Age: 43
End: 2023-01-27
Payer: COMMERCIAL

## 2023-01-27 ENCOUNTER — RESULT REVIEW (OUTPATIENT)
Age: 43
End: 2023-01-27

## 2023-01-27 ENCOUNTER — OUTPATIENT (OUTPATIENT)
Dept: OUTPATIENT SERVICES | Facility: HOSPITAL | Age: 43
LOS: 1 days | End: 2023-01-27
Payer: COMMERCIAL

## 2023-01-27 DIAGNOSIS — Z98.890 OTHER SPECIFIED POSTPROCEDURAL STATES: Chronic | ICD-10-CM

## 2023-01-27 DIAGNOSIS — Z98.890 OTHER SPECIFIED POSTPROCEDURAL STATES: ICD-10-CM

## 2023-01-27 DIAGNOSIS — L72.9 FOLLICULAR CYST OF THE SKIN AND SUBCUTANEOUS TISSUE, UNSPECIFIED: Chronic | ICD-10-CM

## 2023-01-27 DIAGNOSIS — M54.16 RADICULOPATHY, LUMBAR REGION: ICD-10-CM

## 2023-01-27 PROCEDURE — 72131 CT LUMBAR SPINE W/O DYE: CPT | Mod: 26

## 2023-01-27 PROCEDURE — 76376 3D RENDER W/INTRP POSTPROCES: CPT

## 2023-01-27 PROCEDURE — 72131 CT LUMBAR SPINE W/O DYE: CPT

## 2023-01-27 PROCEDURE — 76376 3D RENDER W/INTRP POSTPROCES: CPT | Mod: 26

## 2023-01-27 PROCEDURE — 72148 MRI LUMBAR SPINE W/O DYE: CPT | Mod: 26

## 2023-01-27 PROCEDURE — 72148 MRI LUMBAR SPINE W/O DYE: CPT

## 2023-01-30 ENCOUNTER — APPOINTMENT (OUTPATIENT)
Dept: NEUROSURGERY | Facility: CLINIC | Age: 43
End: 2023-01-30
Payer: COMMERCIAL

## 2023-01-30 VITALS
OXYGEN SATURATION: 98 % | SYSTOLIC BLOOD PRESSURE: 109 MMHG | DIASTOLIC BLOOD PRESSURE: 78 MMHG | HEIGHT: 70 IN | BODY MASS INDEX: 32.21 KG/M2 | HEART RATE: 95 BPM | WEIGHT: 225 LBS

## 2023-01-30 PROCEDURE — 99213 OFFICE O/P EST LOW 20 MIN: CPT

## 2023-01-30 RX ORDER — ERGOCALCIFEROL 1.25 MG/1
1.25 MG CAPSULE ORAL
Qty: 12 | Refills: 3 | Status: DISCONTINUED | COMMUNITY
Start: 2021-09-10 | End: 2023-01-30

## 2023-01-30 NOTE — HISTORY OF PRESENT ILLNESS
[FreeTextEntry1] : Braydon Corral is a pleasant right handed 39 year old gentleman who is s/p bilateral L5-S1 TLIF's with PLIF with pedicle screw fixation; intraoperative CSF leak with primary repair on 1/22/2019.\par \par Pt seen one year ago.  Pt had episode of grabbing his baby before baby fell and felt a pop in the back.  Pt was having some upper back pain on occasion, but since the event, he has been having pain in left paraspinal area with some radiation down to buttock, no leg pain. Then has constant pain above the incision.  PT is not feeling pain when walking.  He feels pain when he bends forward.  When he sits, stands, laying down he is not feeling serous pain.  Just has some back pain if he sits too long.\par \par Meds:  Ozempic, d/c Metformin, 10 mg atorvastatin, cyclobenzaprine (helps but makes him sleepy)-- started med one week ago.

## 2023-01-30 NOTE — PHYSICAL EXAM
[General Appearance - Alert] : alert [General Appearance - In No Acute Distress] : in no acute distress [Well-Healed] : well-healed [Motor Tone] : muscle tone was normal in all four extremities [Motor Strength] : muscle strength was normal in all four extremities [Abnormal Walk] : normal gait [2+] : Patella left 2+ [0] : Ankle jerk left 0 [Plantar Reflex Right Only] : normal on the right [FreeTextEntry8] : Can walk on heels and toes. [FreeTextEntry1] : No SI joint tenderness, no midline tenderness, no spasm noted. [FreeTextEntry3] : backward bending to 20 deg, no pain, forward bending to 45 deg, has back pain; lateral bending bilateral to 30 deg.

## 2023-01-30 NOTE — ASSESSMENT
[FreeTextEntry1] : IMPRESSION:\par Pt s/p bilateral L5-S1 TLIF's with PLIF with pedicle screw fixation; intraoperative CSF leak with primary repair on 1/22/2019. Pt now with 3 weeks of low back pain that is different from previous LBP after lifting his child.  MRI CT show new central disc protrusion at L34 and similar canal findings, with perhaps increased facet arthrosis L34 and L45, but the air in the L45 facet joints are similar to previous.  PT needs physical therapy.  Can take advil or Aleve and continue Flexeril.\par \par PLAN:\par Follow up in 2 months.\par .\par \par \par

## 2023-01-30 NOTE — RESULTS/DATA
[FreeTextEntry1] : EXAM: [id number] - MR SPINE LUMBAR - ORDERED BY: OBDULIA TAYLOR\par \par \par PROCEDURE DATE: 01/27/2023\par \par \par \par INTERPRETATION: MR LUMBAR SPINE\par \par CLINICAL INFORMATION: Status post lumbar laminectomy with new low back pain and radiculopathy for follow up. Pain for 2 weeks. Low back muscle strain.\par \par ADDITIONAL CLINICAL INFORMATION: Low back muscle strain S39.012A\par \par TECHNIQUE: Multiplanar, multisequence MRI was performed of the lumbar spine.\par IV Contrast: None.\par \par COMPARISON: Concurrent CT of the lumbar spine. Lumbar spine MRI dated 1/11/2019.\par \par FINDINGS:\par \par BONES: There are no fractures. Marrow is preserved. L5-S1 pedicle screws and rods are again seen with disc spacer and L5-S1 laminotomy, new from 1/11/2019.\par \par ALIGNMENT: Slight retrolisthesis is present at L4-L5.\par \par VISUALIZED SACROILIAC JOINTS: Maintained.\par \par CONUS AND CAUDA EQUINA: The distal cord and conus are normal in signal. Conus terminates at L1-L2. Clumping of the cauda equina is again seen at and distal to L2-L3 consistent with prior arachnoiditis..\par \par VISUALIZED INTRA-PELVIC/INTRA-ABDOMINAL SOFT TISSUES: Unremarkable.\par \par PARASPINAL SOFT TISSUES: Unremarkable.\par \par INDIVIDUAL LEVELS:\par T12-L1: On sagittal imaging there is mild loss of disc height with slight disc bulging but no stenosis.\par \par L1-L2: No spinal canal or neuroforaminal stenosis.\par \par L2-L3: No spinal canal or neuroforaminal stenosis.\par \par L3-L4: Central disc protrusion is present with mild spinal canal stenosis, new from the prior MRI.\par \par L4-L5: Mild disc bulging is present with slight annular fissuring and mild spinal canal stenosis that is similar to the prior. Mild bilateral neural foraminal stenosis is present. There is mild-to-moderate bilateral facet arthrosis.\par \par L5-S1: Postsurgical changes are present without stenosis.\par \par IMPRESSION:\par 1. L5-S1 postsurgical changes are seen without recurrent stenosis.\par 2. L3-L4 central disc protrusion causes mild spinal canal stenosis, new from the prior MRI.\par 3. L4-L5 demonstrate mild spinal canal stenosis that is similar to the prior.\par 4. Nerve root clumping of the cauda equina is again seen consistent with prior arachnoiditis.\par \par --- End of Report ---\par \par EXAM: [id number] - CT 3D RECONSTRUCT WO [place] - ORDERED BY: OBDULIA TAYLOR\par \par EXAM: [id number] - CT LUMBAR SPINE - ORDERED BY: OBDULIA TAYLOR\par \par \par PROCEDURE DATE: 01/27/2023\par \par \par \par INTERPRETATION: CT LUMBAR SPINE, CT 3D RECONSTRUCTION WO WORKSTATION dated 1/27/2023 9:08 AM\par \par INDICATION: Back pain with radiculopathy. Prior surgery in 1/2019\par \par COMPARISON: CT lumbar spine dated 2/3/2020\par \par TECHNIQUE: CT imaging of the lumbosacral spine was performed. The data was reformatted in the axial, coronal, and sagittal planes. Additionally, 3-D reformatted imaging was created.\par \par FINDINGS:\par \par DISC LEVEL EVALUATION:\par \par T11/T12: Moderate left and mild to moderate right facet productive change. No central canal or foraminal narrowing. Unchanged.\par T12/L1: Mild to moderate facet degenerative change. No central canal or foraminal narrowing. Unchanged\par L1/L2: Moderate facet degenerative change. No central canal or foraminal narrowing. Unchanged.\par L2/L3: Moderate facet degenerative change. Mild mineralization of the ligamentous structures posteriorly. No central canal or foraminal narrowing. Unchanged.\par L3/L4: Moderate facet and mild ligamentous hypertrophy. Broad-based central disc protrusion which indents the ventral thecal sac and contributes to mild to moderate central canal narrowing. No foraminal narrowing. New compared to the prior exam.\par L4/L5: Posterior osseous ridging. Disc bulging with central mineralization has increased in size compared to prior study. Moderate facet degenerative change. Ligamentous hypertrophy. Mild to moderate central canal narrowing. No foraminal narrowing. Progressed when compared to the prior study.\par L5/S1: Laminectomy. Bilateral rods and pedicle screws within the L5 and S1 vertebra. No lucency to suggest loosening. The hardware is intact. Interbody spacers are noted with osseous fusion both posteriorly and anteriorly. There is osseous spurring and productive change along the posterior margin of the L5-S1 disc space. A right lateral recess disc osteophyte complex is present. Mild to moderate bilateral foraminal narrowing. No central canal narrowing. Similar to prior exam.\par \par SPINAL ALIGNMENT: Mild dextrocurvature.\par SI JOINTS: Minimal productive change and spurring at the lower sacroiliac joints.\par OSSEOUS STRUCTURES: Postsurgical changes as described above\par PARASPINAL MUSCLE AND SOFT TISSUES: Postsurgical changes and atrophy of the lower lumbar spine musculature.\par INTRAABDOMINAL/INTRAPELVIC SOFT TISSUES: No abnormality noted.\par \par 3-D reformatted imaging confirms these findings.\par \par IMPRESSION:\par \par Status post laminectomy at L5-S1 with posterior and anterior fusion L5-S1 with solid osseous fusion noted.\par Worsening adjacent disc disease at L3-4 and L4-5 as described above.\par \par --- End of Report ---

## 2023-02-09 ENCOUNTER — RX RENEWAL (OUTPATIENT)
Age: 43
End: 2023-02-09

## 2023-03-03 ENCOUNTER — APPOINTMENT (OUTPATIENT)
Dept: FAMILY MEDICINE | Facility: CLINIC | Age: 43
End: 2023-03-03
Payer: COMMERCIAL

## 2023-03-03 VITALS
OXYGEN SATURATION: 95 % | BODY MASS INDEX: 31.78 KG/M2 | SYSTOLIC BLOOD PRESSURE: 104 MMHG | WEIGHT: 222 LBS | DIASTOLIC BLOOD PRESSURE: 80 MMHG | HEART RATE: 102 BPM | HEIGHT: 70 IN

## 2023-03-03 VITALS — HEART RATE: 92 BPM

## 2023-03-03 DIAGNOSIS — Z87.891 PERSONAL HISTORY OF NICOTINE DEPENDENCE: ICD-10-CM

## 2023-03-03 DIAGNOSIS — M48.07 SPINAL STENOSIS, LUMBOSACRAL REGION: ICD-10-CM

## 2023-03-03 DIAGNOSIS — R06.83 SNORING: ICD-10-CM

## 2023-03-03 DIAGNOSIS — Z00.00 ENCOUNTER FOR GENERAL ADULT MEDICAL EXAMINATION W/OUT ABNORMAL FINDINGS: ICD-10-CM

## 2023-03-03 DIAGNOSIS — E55.9 VITAMIN D DEFICIENCY, UNSPECIFIED: ICD-10-CM

## 2023-03-03 DIAGNOSIS — Z11.59 ENCOUNTER FOR SCREENING FOR OTHER VIRAL DISEASES: ICD-10-CM

## 2023-03-03 DIAGNOSIS — Z23 ENCOUNTER FOR IMMUNIZATION: ICD-10-CM

## 2023-03-03 PROCEDURE — 90686 IIV4 VACC NO PRSV 0.5 ML IM: CPT

## 2023-03-03 PROCEDURE — 99396 PREV VISIT EST AGE 40-64: CPT | Mod: 25

## 2023-03-03 PROCEDURE — 36415 COLL VENOUS BLD VENIPUNCTURE: CPT

## 2023-03-03 PROCEDURE — G0008: CPT

## 2023-03-03 NOTE — REVIEW OF SYSTEMS
[Fatigue] : fatigue [Heartburn] : heartburn [Back Pain] : back pain [Headache] : headache [Negative] : Heme/Lymph [Dizziness] : no dizziness [Suicidal] : not suicidal [Anxiety] : no anxiety [Depression] : no depression [FreeTextEntry6] : snoring  [FreeTextEntry9] : sees neuro  [de-identified] : agreeable to derm  [de-identified] : morning headache - most likely related to sleep apnea- agreeable to referral for sleep study

## 2023-03-03 NOTE — HEALTH RISK ASSESSMENT
[Good] : ~his/her~  mood as  good [No] : No [No falls in past year] : Patient reported no falls in the past year [0] : 2) Feeling down, depressed, or hopeless: Not at all (0) [PHQ-2 Negative - No further assessment needed] : PHQ-2 Negative - No further assessment needed [HIV test declined] : HIV test declined [Hepatitis C test declined] : Hepatitis C test declined [With Significant Other] : lives with significant other [With Family] : lives with family [Employed] : employed [] :  [# Of Children ___] : has [unfilled] children [Feels Safe at Home] : Feels safe at home [Fully functional (bathing, dressing, toileting, transferring, walking, feeding)] : Fully functional (bathing, dressing, toileting, transferring, walking, feeding) [Fully functional (using the telephone, shopping, preparing meals, housekeeping, doing laundry, using] : Fully functional and needs no help or supervision to perform IADLs (using the telephone, shopping, preparing meals, housekeeping, doing laundry, using transportation, managing medications and managing finances) [Smoke Detector] : smoke detector [Carbon Monoxide Detector] : carbon monoxide detector [Safety elements used in home] : safety elements used in home [Seat Belt] :  uses seat belt [With Patient/Caregiver] : , with patient/caregiver [Designated Healthcare Proxy] : Designated healthcare proxy [Name: ___] : Health Care Proxy's Name: [unfilled]  [Relationship: ___] : Relationship: [unfilled] [Former] : Former [5-9] : 5-9 [de-identified] : denying  [de-identified] : neuro  [de-identified] : will increase exercise activity  [de-identified] : reg [[id number]] : 0 [Change in mental status noted] : No change in mental status noted [Language] : denies difficulty with language [None] : None [Sexually Active] : sexually active [Reports changes in hearing] : Reports no changes in hearing [Reports changes in vision] : Reports no changes in vision [Reports changes in dental health] : Reports no changes in dental health [ColonoscopyComments] : denying any FH  [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

## 2023-03-03 NOTE — HISTORY OF PRESENT ILLNESS
[FreeTextEntry1] : check up  [de-identified] : 42 year old male  presents for check up\par heme:  was evaluated for leukocytosis and had negative bone marrow biopsy\par sickle cell trait -reports possible TIA- unsure if really TIA - states most likely anxiety \par neuro: s/p bilateral L5-S1 TLIF's with PLIF with pedicle screw fixation; intraoperative CSF leak with primary repair on 1/22/2019.\par doing PT for lower back , now with upper back pain - saw neurology in January- will go for follow up imaging \par \par had US-showed fatty liver. \par Metformin did not agree with patient. \par  \par \par Allergies: NKA \par Meds: statin 10 (hasn’t been taking consistently) cyclobenzaprine prn, Ozempic thursdays - 1 mg a week (lost weight) \par Supplements: denies \par Health Maintenance:\par Immunizations: declining flu vaccine- education provided \par Covid vaccines- 2 doses and a booster- \par Emotional Health: dealing with routine life stressors- but functioning well \par states dad passed away-2016 - Stroke in [place] - 77 yrs old- spoke with therapist and helped with grieving process- now doing okay

## 2023-03-03 NOTE — PLAN
[FreeTextEntry1] : All problems, medications and allergies were assessed and reviewed with the patient. The patients' blood was drawn in office and will be followed and evaluated for any issues. Patient was told to notify the office if any issues arise. Patient agreeable with plan

## 2023-03-06 ENCOUNTER — TRANSCRIPTION ENCOUNTER (OUTPATIENT)
Age: 43
End: 2023-03-06

## 2023-03-06 LAB
25(OH)D3 SERPL-MCNC: 31.7 NG/ML
ALBUMIN SERPL ELPH-MCNC: 4.5 G/DL
ALP BLD-CCNC: 84 U/L
ALT SERPL-CCNC: 44 U/L
ANION GAP SERPL CALC-SCNC: 16 MMOL/L
APPEARANCE: CLEAR
AST SERPL-CCNC: 24 U/L
BASOPHILS # BLD AUTO: 0.16 K/UL
BASOPHILS NFR BLD AUTO: 1.1 %
BILIRUB SERPL-MCNC: 1 MG/DL
BILIRUBIN URINE: NEGATIVE
BLOOD URINE: NEGATIVE
BUN SERPL-MCNC: 9 MG/DL
CALCIUM SERPL-MCNC: 9.5 MG/DL
CHLORIDE SERPL-SCNC: 103 MMOL/L
CHOLEST SERPL-MCNC: 142 MG/DL
CO2 SERPL-SCNC: 19 MMOL/L
COLOR: YELLOW
COVID-19 NUCLEOCAPSID  GAM ANTIBODY INTERPRETATION: NEGATIVE
COVID-19 SPIKE DOMAIN ANTIBODY INTERPRETATION: POSITIVE
CREAT SERPL-MCNC: 1.11 MG/DL
EGFR: 86 ML/MIN/1.73M2
EOSINOPHIL # BLD AUTO: 0.4 K/UL
EOSINOPHIL NFR BLD AUTO: 2.7 %
ESTIMATED AVERAGE GLUCOSE: 140 MG/DL
FOLATE SERPL-MCNC: 12.8 NG/ML
GLUCOSE QUALITATIVE U: NEGATIVE
GLUCOSE SERPL-MCNC: 149 MG/DL
HBA1C MFR BLD HPLC: 6.5 %
HCT VFR BLD CALC: 50.7 %
HDLC SERPL-MCNC: 39 MG/DL
HGB BLD-MCNC: 17.1 G/DL
IMM GRANULOCYTES NFR BLD AUTO: 0.5 %
KETONES URINE: NEGATIVE
LDLC SERPL CALC-MCNC: 82 MG/DL
LEUKOCYTE ESTERASE URINE: NEGATIVE
LYMPHOCYTES # BLD AUTO: 3.45 K/UL
LYMPHOCYTES NFR BLD AUTO: 23.2 %
MAGNESIUM SERPL-MCNC: 1.9 MG/DL
MAN DIFF?: NORMAL
MCHC RBC-ENTMCNC: 27.1 PG
MCHC RBC-ENTMCNC: 33.7 GM/DL
MCV RBC AUTO: 80.2 FL
MONOCYTES # BLD AUTO: 0.87 K/UL
MONOCYTES NFR BLD AUTO: 5.9 %
NEUTROPHILS # BLD AUTO: 9.89 K/UL
NEUTROPHILS NFR BLD AUTO: 66.6 %
NITRITE URINE: NEGATIVE
NONHDLC SERPL-MCNC: 104 MG/DL
PH URINE: 5.5
PLATELET # BLD AUTO: 385 K/UL
POTASSIUM SERPL-SCNC: 4.3 MMOL/L
PROT SERPL-MCNC: 7.2 G/DL
PROTEIN URINE: NORMAL
PSA SERPL-MCNC: 0.51 NG/ML
RBC # BLD: 6.32 M/UL
RBC # FLD: 15.8 %
SARS-COV-2 AB SERPL IA-ACNC: >250 U/ML
SARS-COV-2 AB SERPL QL IA: 0.07 INDEX
SODIUM SERPL-SCNC: 139 MMOL/L
SPECIFIC GRAVITY URINE: 1.02
TRIGL SERPL-MCNC: 110 MG/DL
TSH SERPL-ACNC: 1.49 UIU/ML
UROBILINOGEN URINE: NORMAL
VIT B12 SERPL-MCNC: 409 PG/ML
WBC # FLD AUTO: 14.85 K/UL

## 2023-03-08 ENCOUNTER — TRANSCRIPTION ENCOUNTER (OUTPATIENT)
Age: 43
End: 2023-03-08

## 2023-03-13 ENCOUNTER — RX RENEWAL (OUTPATIENT)
Age: 43
End: 2023-03-13

## 2023-03-14 ENCOUNTER — RX RENEWAL (OUTPATIENT)
Age: 43
End: 2023-03-14

## 2023-03-20 ENCOUNTER — APPOINTMENT (OUTPATIENT)
Dept: NEUROSURGERY | Facility: CLINIC | Age: 43
End: 2023-03-20
Payer: COMMERCIAL

## 2023-03-20 ENCOUNTER — NON-APPOINTMENT (OUTPATIENT)
Age: 43
End: 2023-03-20

## 2023-03-20 VITALS
HEIGHT: 70 IN | HEART RATE: 86 BPM | BODY MASS INDEX: 31.78 KG/M2 | WEIGHT: 222 LBS | SYSTOLIC BLOOD PRESSURE: 119 MMHG | OXYGEN SATURATION: 96 % | DIASTOLIC BLOOD PRESSURE: 80 MMHG

## 2023-03-20 DIAGNOSIS — M54.2 CERVICALGIA: ICD-10-CM

## 2023-03-20 DIAGNOSIS — M54.9 DORSALGIA, UNSPECIFIED: ICD-10-CM

## 2023-03-20 PROCEDURE — 99214 OFFICE O/P EST MOD 30 MIN: CPT

## 2023-03-23 NOTE — REVIEW OF SYSTEMS
[Joint Pain] : joint pain [Limb Pain] : limb pain [Negative] : Heme/Lymph [Numbness] : no numbness [Tingling] : no tingling [FreeTextEntry9] : mid back pain

## 2023-03-23 NOTE — PHYSICAL EXAM
[General Appearance - Alert] : alert [General Appearance - In No Acute Distress] : in no acute distress [General Appearance - Well Nourished] : well nourished [General Appearance - Well-Appearing] : healthy appearing [Oriented To Time, Place, And Person] : oriented to person, place, and time [Impaired Insight] : insight and judgment were intact [Affect] : the affect was normal [Memory Recent] : recent memory was not impaired [Person] : oriented to person [Place] : oriented to place [Time] : oriented to time [Short Term Intact] : short term memory intact [Cranial Nerves Optic (II)] : visual acuity intact bilaterally,  pupils equal round and reactive to light [Cranial Nerves Oculomotor (III)] : extraocular motion intact [Cranial Nerves Trigeminal (V)] : facial sensation intact symmetrically [Cranial Nerves Facial (VII)] : face symmetrical [Cranial Nerves Vestibulocochlear (VIII)] : hearing was intact bilaterally [Cranial Nerves Accessory (XI - Cranial And Spinal)] : head turning and shoulder shrug symmetric [Cranial Nerves Hypoglossal (XII)] : there was no tongue deviation with protrusion [Motor Tone] : muscle tone was normal in all four extremities [Motor Strength] : muscle strength was normal in all four extremities [Sensation Tactile Decrease] : light touch was intact [Sensation Pain / Temperature Decrease] : pain and temperature was intact [Balance] : balance was intact [1+] : Triceps left 1+ [No Visual Abnormalities] : no visible abnormailities [No Scoliosis] : no scoliosis [No Masses] : no masses [Full ROM] : full ROM [No Pain with ROM] : no pain with motion in any direction [Sclera] : the sclera and conjunctiva were normal [PERRL With Normal Accommodation] : pupils were equal in size, round, reactive to light, with normal accommodation [Full Visual Field] : full visual field [Outer Ear] : the ears and nose were normal in appearance [Both Tympanic Membranes Were Examined] : both tympanic membranes were normal [Neck Appearance] : the appearance of the neck was normal [] : no respiratory distress [Respiration, Rhythm And Depth] : normal respiratory rhythm and effort [Apical Impulse] : the apical impulse was normal [Heart Rate And Rhythm] : heart rate was normal and rhythm regular [Arterial Pulses Carotid] : carotid pulses were normal with no bruits [Abdominal Aorta] : the abdominal aorta was normal [No CVA Tenderness] : no ~M costovertebral angle tenderness [No Spinal Tenderness] : no spinal tenderness [Abnormal Walk] : normal gait [Involuntary Movements] : no involuntary movements were seen [Skin Color & Pigmentation] : normal skin color and pigmentation [2+] : Brachioradialis left 2+ [No Tenderness to Palpation] : no spine tenderness on palpation [FreeTextEntry1] : no spasm

## 2023-03-23 NOTE — ASSESSMENT
[FreeTextEntry1] : \par IMPRESSION:\par \par Pt s/p bilateral L5- S1 TLIF's with PLIF with pedicle screw fixation; intraoperative CSF leak with primary repair on 1/22/2019. Pt now with 3 weeks of low back pain that is different from previous LBP after lifting his child. MRI CT show new central disc protrusion at L34 and similar canal findings, with perhaps increased facet arthrosis L34 and L45, but the air in the L45 facet joints are similar to previous, with good osseous fusion. PT sessions for back completed with relief. Pt with new upper -mid back pain which is a concern.Will order imaging for evaluation.\par \par PLAN: \par \par Continue  PT for back for strengthening and modalities , 2-3 times/week x 8 weeks. \par MRI Thoracic spine w/o contrast\par MRI Cervical spine w/o contrast \par F/u in 1 month after imaging \par \par

## 2023-03-23 NOTE — HISTORY OF PRESENT ILLNESS
[FreeTextEntry1] : Braydon Corral is a pleasant right handed 39 year old gentleman who is s/p bilateral L5-S1 TLIF's with PLIF with pedicle screw fixation; intraoperative CSF leak with primary repair on 1/22/2019.\par \par Pt seen in Jan 2023 with images and back for an other follow up. He reports having mid back pain since last year, which was resolved few months ago and now back after doing PT. he stopped PT few weeks ago. However  Lower back and radiculopathy has resolved. He still has some pressure feeling occasionally at the surgical area.  He feels more mid back pain when he bends forward/ or in some positions.

## 2023-04-06 ENCOUNTER — APPOINTMENT (OUTPATIENT)
Dept: MRI IMAGING | Facility: CLINIC | Age: 43
End: 2023-04-06
Payer: COMMERCIAL

## 2023-04-06 ENCOUNTER — OUTPATIENT (OUTPATIENT)
Dept: OUTPATIENT SERVICES | Facility: HOSPITAL | Age: 43
LOS: 1 days | End: 2023-04-06
Payer: COMMERCIAL

## 2023-04-06 DIAGNOSIS — M54.2 CERVICALGIA: ICD-10-CM

## 2023-04-06 DIAGNOSIS — Z00.8 ENCOUNTER FOR OTHER GENERAL EXAMINATION: ICD-10-CM

## 2023-04-06 DIAGNOSIS — Z98.890 OTHER SPECIFIED POSTPROCEDURAL STATES: Chronic | ICD-10-CM

## 2023-04-06 DIAGNOSIS — L72.9 FOLLICULAR CYST OF THE SKIN AND SUBCUTANEOUS TISSUE, UNSPECIFIED: Chronic | ICD-10-CM

## 2023-04-06 PROCEDURE — 72146 MRI CHEST SPINE W/O DYE: CPT | Mod: 26

## 2023-04-06 PROCEDURE — 72141 MRI NECK SPINE W/O DYE: CPT

## 2023-04-06 PROCEDURE — 72141 MRI NECK SPINE W/O DYE: CPT | Mod: 26

## 2023-04-06 PROCEDURE — 72146 MRI CHEST SPINE W/O DYE: CPT

## 2023-04-07 ENCOUNTER — APPOINTMENT (OUTPATIENT)
Dept: NEUROSURGERY | Facility: CLINIC | Age: 43
End: 2023-04-07

## 2023-04-21 ENCOUNTER — OUTPATIENT (OUTPATIENT)
Dept: OUTPATIENT SERVICES | Facility: HOSPITAL | Age: 43
LOS: 1 days | Discharge: HOME | End: 2023-04-21

## 2023-04-21 DIAGNOSIS — L72.9 FOLLICULAR CYST OF THE SKIN AND SUBCUTANEOUS TISSUE, UNSPECIFIED: Chronic | ICD-10-CM

## 2023-04-21 DIAGNOSIS — D64.9 ANEMIA, UNSPECIFIED: ICD-10-CM

## 2023-04-21 DIAGNOSIS — Z98.890 OTHER SPECIFIED POSTPROCEDURAL STATES: Chronic | ICD-10-CM

## 2023-04-25 PROBLEM — R79.89 ABNORMAL CBC: Status: ACTIVE | Noted: 2023-03-24

## 2023-04-25 NOTE — ED PROVIDER NOTE - [id number]_ED_A_ED
Scribe Attestation (For Scribes USE Only)... alert Attending Attestation (For Attendings USE Only).../Scribe Attestation (For Scribes USE Only)...

## 2023-04-26 ENCOUNTER — RESULT REVIEW (OUTPATIENT)
Age: 43
End: 2023-04-26

## 2023-04-26 ENCOUNTER — NON-APPOINTMENT (OUTPATIENT)
Age: 43
End: 2023-04-26

## 2023-04-26 ENCOUNTER — APPOINTMENT (OUTPATIENT)
Dept: HEMATOLOGY ONCOLOGY | Facility: CLINIC | Age: 43
End: 2023-04-26
Payer: COMMERCIAL

## 2023-04-26 VITALS
OXYGEN SATURATION: 98 % | DIASTOLIC BLOOD PRESSURE: 83 MMHG | BODY MASS INDEX: 32.98 KG/M2 | SYSTOLIC BLOOD PRESSURE: 127 MMHG | TEMPERATURE: 97 F | HEART RATE: 92 BPM | WEIGHT: 222.66 LBS | RESPIRATION RATE: 17 BRPM | HEIGHT: 68.74 IN

## 2023-04-26 DIAGNOSIS — M54.42 LUMBAGO WITH SCIATICA, LEFT SIDE: ICD-10-CM

## 2023-04-26 DIAGNOSIS — R79.89 OTHER SPECIFIED ABNORMAL FINDINGS OF BLOOD CHEMISTRY: ICD-10-CM

## 2023-04-26 DIAGNOSIS — E11.9 TYPE 2 DIABETES MELLITUS W/OUT COMPLICATIONS: ICD-10-CM

## 2023-04-26 DIAGNOSIS — M54.50 LOW BACK PAIN, UNSPECIFIED: ICD-10-CM

## 2023-04-26 DIAGNOSIS — G89.29 LUMBAGO WITH SCIATICA, LEFT SIDE: ICD-10-CM

## 2023-04-26 DIAGNOSIS — G89.29 LOW BACK PAIN, UNSPECIFIED: ICD-10-CM

## 2023-04-26 LAB
BASOPHILS # BLD AUTO: 0.12 K/UL — SIGNIFICANT CHANGE UP (ref 0–0.2)
BASOPHILS NFR BLD AUTO: 0.9 % — SIGNIFICANT CHANGE UP (ref 0–2)
EOSINOPHIL # BLD AUTO: 0.4 K/UL — SIGNIFICANT CHANGE UP (ref 0–0.5)
EOSINOPHIL NFR BLD AUTO: 3 % — SIGNIFICANT CHANGE UP (ref 0–6)
HCT VFR BLD CALC: 47.4 % — SIGNIFICANT CHANGE UP (ref 39–50)
HGB BLD-MCNC: 16.3 G/DL — SIGNIFICANT CHANGE UP (ref 13–17)
IMM GRANULOCYTES NFR BLD AUTO: 0.5 % — SIGNIFICANT CHANGE UP (ref 0–0.9)
LYMPHOCYTES # BLD AUTO: 24.7 % — SIGNIFICANT CHANGE UP (ref 13–44)
LYMPHOCYTES # BLD AUTO: 3.27 K/UL — SIGNIFICANT CHANGE UP (ref 1–3.3)
MCHC RBC-ENTMCNC: 27.4 PG — SIGNIFICANT CHANGE UP (ref 27–34)
MCHC RBC-ENTMCNC: 34.4 G/DL — SIGNIFICANT CHANGE UP (ref 32–36)
MCV RBC AUTO: 79.7 FL — LOW (ref 80–100)
MONOCYTES # BLD AUTO: 0.79 K/UL — SIGNIFICANT CHANGE UP (ref 0–0.9)
MONOCYTES NFR BLD AUTO: 6 % — SIGNIFICANT CHANGE UP (ref 2–14)
NEUTROPHILS # BLD AUTO: 8.58 K/UL — HIGH (ref 1.8–7.4)
NEUTROPHILS NFR BLD AUTO: 64.9 % — SIGNIFICANT CHANGE UP (ref 43–77)
NRBC # BLD: 0 /100 WBCS — SIGNIFICANT CHANGE UP (ref 0–0)
PLATELET # BLD AUTO: 354 K/UL — SIGNIFICANT CHANGE UP (ref 150–400)
RBC # BLD: 5.95 M/UL — HIGH (ref 4.2–5.8)
RBC # FLD: 13.7 % — SIGNIFICANT CHANGE UP (ref 10.3–14.5)
WBC # BLD: 13.22 K/UL — HIGH (ref 3.8–10.5)
WBC # FLD AUTO: 13.22 K/UL — HIGH (ref 3.8–10.5)

## 2023-04-26 PROCEDURE — 99417 PROLNG OP E/M EACH 15 MIN: CPT

## 2023-04-26 PROCEDURE — 99215 OFFICE O/P EST HI 40 MIN: CPT

## 2023-04-26 PROCEDURE — G2212 PROLONG OUTPT/OFFICE VIS: CPT

## 2023-04-26 RX ORDER — CYCLOBENZAPRINE HYDROCHLORIDE 10 MG/1
10 TABLET, FILM COATED ORAL 3 TIMES DAILY
Qty: 90 | Refills: 0 | Status: DISCONTINUED | COMMUNITY
Start: 2023-01-13 | End: 2023-04-26

## 2023-04-26 NOTE — RESULTS/DATA
[FreeTextEntry1] : 03/03/2023 CBC WBC 14.85 RBC 6.32  HGB 17.1 MCV 80.2  000\par 04/26/2023 CBC WBC 13.22 RBC 5.93 HGB 16.3 MCV 79.7   000

## 2023-04-26 NOTE — ASSESSMENT
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 He had been on steroid treatment for back lumbar sacral disease. Now blood counts while not normal have reverted [place] [address] to 15 000 cells. Neutrophil predominance and this has been the pattern over several years. He has seen Harpreet Springer and a hematologist associated with [facility]; he has had a normal bone marrow test by his accounting of the history.\par The physical examination is normal and review of the peripheral blood smear shows normal appearance of the neutrophils. No blast forms and no basophil are seen by me. \par I will request specific gene testing including BCR ABL, BRENNAN, and serum erythropoietin.\par The patient will be seen in follow up in 3 weeks by Jaleesa MERCHANT. If blood studies noted above are normal, he may be referred back to primary care for follow up

## 2023-04-26 NOTE — HISTORY OF PRESENT ILLNESS
[Disease:__________________________] : Disease: [unfilled] [Date: ____________] : Patient's last distress assessment performed on [unfilled]. [0 - No Distress] : Distress Level: 0 [100: Normal, no complaints, no evidence of disease.] : 100: Normal, no complaints, no evidence of disease. [ECOG Performance Status: 0 - Fully active, able to carry on all pre-disease performance without restriction] : Performance Status: 0 - Fully active, able to carry on all pre-disease performance without restriction [FreeTextEntry1] : first visit with Dr Garcia [de-identified] : see hpi [de-identified] : 42 year old male  presents for check up\par heme:  was evaluated for leukocytosis and had negative bone marrow biopsy\par sickle cell trait -reports possible TIA- unsure if really TIA - states most likely anxiety \par neuro: s/p bilateral L5-S1 TLIF's with PLIF with pedicle screw fixation; intraoperative CSF leak with primary repair on 1/22/2019.\par doing PT for lower back , now with upper back pain - saw neurology in January- will go for follow up imaging \par \par had US-showed fatty liver. \par Metformin did not agree with patient. \par  \par \par Allergies: NKA \par Meds: statin 10 (hasn’t been taking consistently) cyclobenzaprine prn, Ozempic thursdays - 1 mg a week (lost weight) \par Supplements: denies \par Health Maintenance:\par Immunizations: declining flu vaccine- education provided \par Covid vaccines- 2 doses and a booster- \par Emotional Health: dealing with routine life stressors- but functioning well \par states dad passed away-2016 - Stroke in [place] - 77 yrs old- spoke with therapist and helped with grieving process- now doing okay

## 2023-04-27 LAB — EPO SERPL-MCNC: 13.7 MIU/ML

## 2023-05-01 LAB — T(9;22)(ABL1,BCR)/CONTROL BLD/T: NORMAL

## 2023-05-05 ENCOUNTER — APPOINTMENT (OUTPATIENT)
Dept: NEUROSURGERY | Facility: CLINIC | Age: 43
End: 2023-05-05
Payer: COMMERCIAL

## 2023-05-05 VITALS
HEART RATE: 90 BPM | OXYGEN SATURATION: 99 % | BODY MASS INDEX: 33.65 KG/M2 | SYSTOLIC BLOOD PRESSURE: 115 MMHG | DIASTOLIC BLOOD PRESSURE: 80 MMHG | WEIGHT: 222 LBS | HEIGHT: 68 IN

## 2023-05-05 DIAGNOSIS — E66.9 OBESITY, UNSPECIFIED: ICD-10-CM

## 2023-05-05 DIAGNOSIS — G47.30 SLEEP APNEA, UNSPECIFIED: ICD-10-CM

## 2023-05-05 PROCEDURE — 99214 OFFICE O/P EST MOD 30 MIN: CPT

## 2023-05-12 LAB — JAK2 P.V617F BLD/T QL: NORMAL

## 2023-05-14 NOTE — ASSESSMENT
[FreeTextEntry1] : IMPRESSION:\par \par Pt s/p bilateral L5- S1 TLIF's with PLIF with pedicle screw fixation; intraoperative CSF leak with primary repair on 1/22/2019. Pt now with 3 weeks of low back pain that is different from previous LBP after lifting his child. MRI CT show new central disc protrusion at L34 and similar canal findings, with perhaps increased facet arthrosis L34 and L45, but the air in the L45 facet joints are similar to previous, with good osseous fusion. PT sessions for back completed with relief. Pt with new upper -mid back pain with imaging reveals mild degenerative disc diseases.  Will need more therapy. \par \par PLAN: \par \par Continue PT for back for strengthening and modalities , 2-3 times/week x 8 weeks. \par Referred to sleep medicine for evaluation of sleep apnea\par F/U in 2 months

## 2023-05-14 NOTE — PHYSICAL EXAM
[General Appearance - Alert] : alert [General Appearance - In No Acute Distress] : in no acute distress [General Appearance - Well-Appearing] : healthy appearing [General Appearance - Well Nourished] : well nourished [Oriented To Time, Place, And Person] : oriented to person, place, and time [Impaired Insight] : insight and judgment were intact [Affect] : the affect was normal [Memory Recent] : recent memory was not impaired [Person] : oriented to person [Place] : oriented to place [Time] : oriented to time [Short Term Intact] : short term memory intact [Motor Tone] : muscle tone was normal in all four extremities [Motor Strength] : muscle strength was normal in all four extremities [Balance] : balance was intact [Sclera] : the sclera and conjunctiva were normal [PERRL With Normal Accommodation] : pupils were equal in size, round, reactive to light, with normal accommodation [Outer Ear] : the ears and nose were normal in appearance [Both Tympanic Membranes Were Examined] : both tympanic membranes were normal [Neck Appearance] : the appearance of the neck was normal [] : no respiratory distress [Respiration, Rhythm And Depth] : normal respiratory rhythm and effort [Apical Impulse] : the apical impulse was normal [Heart Rate And Rhythm] : heart rate was normal and rhythm regular [Arterial Pulses Carotid] : carotid pulses were normal with no bruits [Abdominal Aorta] : the abdominal aorta was normal [Bowel Sounds] : normal bowel sounds [Abdomen Soft] : soft [No CVA Tenderness] : no ~M costovertebral angle tenderness [No Spinal Tenderness] : no spinal tenderness [Abnormal Walk] : normal gait [Involuntary Movements] : no involuntary movements were seen [Skin Color & Pigmentation] : normal skin color and pigmentation [No Tenderness to Palpation] : no spine tenderness on palpation [Restricted] : was not restricted [Pain] : was painless [FreeTextEntry1] : no spasm

## 2023-05-14 NOTE — HISTORY OF PRESENT ILLNESS
[FreeTextEntry1] : Braydon Corral is a pleasant right handed 39 year old gentleman who is s/p bilateral L5-S1 TLIF's with PLIF with pedicle screw fixation; intraoperative CSF leak with primary repair on 1/22/2019. Pt was recently seen in Jan and March 2023 with images for back pain and was ordered Pt and further imaging.\par \par Pt is better with the lower back pain after the PT. Last PT was in March. Pt c/o mid back pain which is started last year was treated initially with Medrol dose pack, but now it is more frequent. Pt also feels occasional numbness in the right arm, mostly to the upper arm.  Pt take Flexeril as needed, sometime Advil which is helping. Pt doing home exercises and regular stretches, had not done any therapy for upper back. Pt also wanted to know if he can be tested for sleep apnea as he gets up early and unable to go back to sleep after that.

## 2023-05-14 NOTE — REVIEW OF SYSTEMS
[Numbness] : numbness [Tingling] : tingling [Joint Pain] : joint pain [Limb Pain] : limb pain [Negative] : Heme/Lymph [Arm Weakness] : no arm weakness [Hand Weakness] : no hand weakness

## 2023-05-15 ENCOUNTER — APPOINTMENT (OUTPATIENT)
Dept: HEMATOLOGY ONCOLOGY | Facility: CLINIC | Age: 43
End: 2023-05-15
Payer: COMMERCIAL

## 2023-05-15 ENCOUNTER — RESULT REVIEW (OUTPATIENT)
Age: 43
End: 2023-05-15

## 2023-05-15 VITALS
TEMPERATURE: 97.2 F | BODY MASS INDEX: 33.76 KG/M2 | HEART RATE: 92 BPM | SYSTOLIC BLOOD PRESSURE: 120 MMHG | WEIGHT: 222.01 LBS | DIASTOLIC BLOOD PRESSURE: 85 MMHG | OXYGEN SATURATION: 99 % | RESPIRATION RATE: 16 BRPM

## 2023-05-15 DIAGNOSIS — Z87.898 PERSONAL HISTORY OF OTHER SPECIFIED CONDITIONS: ICD-10-CM

## 2023-05-15 DIAGNOSIS — D57.3 SICKLE-CELL TRAIT: ICD-10-CM

## 2023-05-15 DIAGNOSIS — R71.8 OTHER ABNORMALITY OF RED BLOOD CELLS: ICD-10-CM

## 2023-05-15 DIAGNOSIS — D72.9 DISORDER OF WHITE BLOOD CELLS, UNSPECIFIED: ICD-10-CM

## 2023-05-15 LAB
BASOPHILS # BLD AUTO: 0.21 K/UL — HIGH (ref 0–0.2)
BASOPHILS NFR BLD AUTO: 1.7 % — SIGNIFICANT CHANGE UP (ref 0–2)
EOSINOPHIL # BLD AUTO: 0.27 K/UL — SIGNIFICANT CHANGE UP (ref 0–0.5)
EOSINOPHIL NFR BLD AUTO: 2.2 % — SIGNIFICANT CHANGE UP (ref 0–6)
HCT VFR BLD CALC: 46 % — SIGNIFICANT CHANGE UP (ref 39–50)
HGB BLD-MCNC: 16.1 G/DL — SIGNIFICANT CHANGE UP (ref 13–17)
IMM GRANULOCYTES NFR BLD AUTO: 0.6 % — SIGNIFICANT CHANGE UP (ref 0–0.9)
LYMPHOCYTES # BLD AUTO: 2.83 K/UL — SIGNIFICANT CHANGE UP (ref 1–3.3)
LYMPHOCYTES # BLD AUTO: 22.9 % — SIGNIFICANT CHANGE UP (ref 13–44)
MCHC RBC-ENTMCNC: 27.6 PG — SIGNIFICANT CHANGE UP (ref 27–34)
MCHC RBC-ENTMCNC: 35 G/DL — SIGNIFICANT CHANGE UP (ref 32–36)
MCV RBC AUTO: 78.9 FL — LOW (ref 80–100)
MONOCYTES # BLD AUTO: 0.68 K/UL — SIGNIFICANT CHANGE UP (ref 0–0.9)
MONOCYTES NFR BLD AUTO: 5.5 % — SIGNIFICANT CHANGE UP (ref 2–14)
NEUTROPHILS # BLD AUTO: 8.28 K/UL — HIGH (ref 1.8–7.4)
NEUTROPHILS NFR BLD AUTO: 67.1 % — SIGNIFICANT CHANGE UP (ref 43–77)
NRBC # BLD: 0 /100 WBCS — SIGNIFICANT CHANGE UP (ref 0–0)
PLATELET # BLD AUTO: 321 K/UL — SIGNIFICANT CHANGE UP (ref 150–400)
RBC # BLD: 5.83 M/UL — HIGH (ref 4.2–5.8)
RBC # FLD: 13.6 % — SIGNIFICANT CHANGE UP (ref 10.3–14.5)
WBC # BLD: 12.34 K/UL — HIGH (ref 3.8–10.5)
WBC # FLD AUTO: 12.34 K/UL — HIGH (ref 3.8–10.5)

## 2023-05-15 PROCEDURE — 99214 OFFICE O/P EST MOD 30 MIN: CPT

## 2023-05-17 NOTE — HISTORY OF PRESENT ILLNESS
[Disease:__________________________] : Disease: [unfilled] [Date: ____________] : Patient's last distress assessment performed on [unfilled]. [0 - No Distress] : Distress Level: 0 [100: Normal, no complaints, no evidence of disease.] : 100: Normal, no complaints, no evidence of disease. [ECOG Performance Status: 0 - Fully active, able to carry on all pre-disease performance without restriction] : Performance Status: 0 - Fully active, able to carry on all pre-disease performance without restriction [de-identified] : see hpi\par 2nd Visit - Patient seen in a f/u visit remains asymptomatic. \par He had episode of viral gastroenteritis in 1st week of May 2023 had c/o abd discomfort, bloody diarrhea resolved in 2-3 days, has scheduled GI eval on 6/1/2023. No recurrence. \par Denies hospitalization, febrile illness, fever, chills, bleeding, bruising, bone pain, swollen glands. He reports Snoring - PCP referred him for Sleep study to evaluate for TIANNA\par Pt w h/o Splenomegaly in the past noted on US over 10 years ago at outside facility. Pt followed by NeuroSx for back pain recommended to continue PT. \par He is trying to maintain healthy lifestyle by dietary modification, started on Ozempic. He works as [employer] works from home. \par  [FreeTextEntry1] : first visit with Dr Garcia [de-identified] : 42 year old male  presents for check up\par heme:  was evaluated for leukocytosis and had negative bone marrow biopsy\par sickle cell trait -reports possible TIA- unsure if really TIA - states most likely anxiety \par neuro: s/p bilateral L5-S1 TLIF's with PLIF with pedicle screw fixation; intraoperative CSF leak with primary repair on 1/22/2019.\par doing PT for lower back , now with upper back pain - saw neurology in January- will go for follow up imaging \par \par had US-showed fatty liver. \par Metformin did not agree with patient. \par  \par \par Allergies: NKA \par Meds: statin 10 (hasn’t been taking consistently) cyclobenzaprine prn, Ozempic thursdays - 1 mg a week (lost weight) \par Supplements: denies \par Health Maintenance:\par Immunizations: declining flu vaccine- education provided \par Covid vaccines- 2 doses and a booster- \par Emotional Health: dealing with routine life stressors- but functioning well \par states dad passed away-2016 - Stroke in [place] - 77 yrs old- spoke with therapist and helped with grieving process- now doing okay

## 2023-05-17 NOTE — ASSESSMENT
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 He had been on steroid treatment for back lumbar sacral disease. Now blood counts while not normal have reverted [place] [address] to 15 000 cells. Neutrophil predominance and this has been the pattern over several years. He has seen Harpreet Springer and a hematologist associated with [facility]; he has had a normal bone marrow test by his accounting of the history.\par The physical examination is normal and review of the peripheral blood smear shows normal appearance of the neutrophils. No blast forms and no basophil are seen by me. \par I will request specific gene testing including BCR ABL, BRENNAN, and serum erythropoietin.\par The patient will be seen in follow up in 3 weeks by Jaleesa MERCHANT. If blood studies noted above are normal, he may be referred back to primary care for follow up\par \par May 15, 2023 - Pt w h/o TIA, Migraines, Chronic Low back pain s/p Lumbar fusion surgery use of Steroid injections for back pain, Sickle Cell trait, Thalassemia trait, RBC Microcytosis, 11 year h/o Neutrophilic Leukocytosis, Splenomegaly, h/o Bone Marrow biopsy few years ago reports as Negative, seen in a f/u visit today\par 1) Neutrophilic Leukocytosis - Today's CBC - WBC = 12.3, RBC = 5.83, Hgb = 16.1, PLT = 321, MCV = 78.9 \par ANC = 8.28,  WBC trending down, no recent febrile illness, \par BCR-ABL - Negative. BRENNAN-2 Mutation results pending. \par 2) Sickle Cell trait, Thalassemia trait - Microcytosis without Anemia \par 3) h/o Splenomegaly - US Abdomen ordered- pt advised to schedule apptn & maintain NPO for study \par 4) GI evaluation pending - pt w One episode of rectal bleeding in setting of ? viral gastroenteritis self limited \par 5) Evaluate for TIANNA h/o snoring - Sleep study, Pulm eval pending\par Medication compliance, f/u w PCP, Pulm, GI, NeuroSx was recommended, all questions, concerns were addressed during this visit to his satisfaction he verbalized understanding\par RTC in 6 months or sooner if clinically indicated\par Case management, care plan d/w Dr. Husam Garcia

## 2023-05-17 NOTE — REVIEW OF SYSTEMS
[Negative] : Allergic/Immunologic [Recent Change In Weight] : ~T recent weight change [Fatigue] : no fatigue [FreeTextEntry2] : on Ozempic, and dietary modification  [FreeTextEntry3] : wears eyeglasses [FreeTextEntry7] : 1st week of May 2023 abd pain x 2-3 days, diarrhea, rectal bleed resolved in 3 days - GI eval scheduled

## 2023-05-17 NOTE — RESULTS/DATA
[FreeTextEntry1] : 03/03/2023 CBC WBC 14.85 RBC 6.32  HGB 17.1 MCV 80.2  000\par 04/26/2023 CBC WBC 13.22 RBC 5.93 HGB 16.3 MCV 79.7   000\par 5/15/23- CBC WBC = 12.3, RBC = 5.83, Hgb = 16.1, PLT = 321, MCV = 78.9 \par ANC = 8.28

## 2023-05-17 NOTE — REASON FOR VISIT
[Blood Count Assessment] : blood count assessment [Leukocytosis] : leukocytosis [Spouse] : spouse [Follow-Up Visit] : a follow-up visit for [FreeTextEntry2] : sickle cell trait and RBC microcytosis and prenatal counseling

## 2023-05-19 ENCOUNTER — NON-APPOINTMENT (OUTPATIENT)
Age: 43
End: 2023-05-19

## 2023-05-19 LAB — JAK2 GENE MUT ANL BLD/T: NORMAL

## 2023-05-30 ENCOUNTER — NON-APPOINTMENT (OUTPATIENT)
Age: 43
End: 2023-05-30

## 2023-05-31 ENCOUNTER — OUTPATIENT (OUTPATIENT)
Dept: OUTPATIENT SERVICES | Facility: HOSPITAL | Age: 43
LOS: 1 days | End: 2023-05-31
Payer: COMMERCIAL

## 2023-05-31 ENCOUNTER — APPOINTMENT (OUTPATIENT)
Dept: ULTRASOUND IMAGING | Facility: CLINIC | Age: 43
End: 2023-05-31
Payer: COMMERCIAL

## 2023-05-31 ENCOUNTER — FORM ENCOUNTER (OUTPATIENT)
Age: 43
End: 2023-05-31

## 2023-05-31 DIAGNOSIS — Z87.898 PERSONAL HISTORY OF OTHER SPECIFIED CONDITIONS: ICD-10-CM

## 2023-05-31 DIAGNOSIS — Z98.890 OTHER SPECIFIED POSTPROCEDURAL STATES: Chronic | ICD-10-CM

## 2023-05-31 DIAGNOSIS — L72.9 FOLLICULAR CYST OF THE SKIN AND SUBCUTANEOUS TISSUE, UNSPECIFIED: Chronic | ICD-10-CM

## 2023-05-31 PROCEDURE — 76700 US EXAM ABDOM COMPLETE: CPT

## 2023-05-31 PROCEDURE — 76700 US EXAM ABDOM COMPLETE: CPT | Mod: 26

## 2023-06-01 ENCOUNTER — APPOINTMENT (OUTPATIENT)
Dept: GASTROENTEROLOGY | Facility: CLINIC | Age: 43
End: 2023-06-01
Payer: COMMERCIAL

## 2023-06-01 VITALS
SYSTOLIC BLOOD PRESSURE: 109 MMHG | WEIGHT: 222 LBS | HEIGHT: 68 IN | OXYGEN SATURATION: 99 % | HEART RATE: 93 BPM | DIASTOLIC BLOOD PRESSURE: 77 MMHG | TEMPERATURE: 97.3 F | BODY MASS INDEX: 33.65 KG/M2

## 2023-06-01 PROCEDURE — 99204 OFFICE O/P NEW MOD 45 MIN: CPT

## 2023-06-01 RX ORDER — SODIUM SULFATE, POTASSIUM SULFATE AND MAGNESIUM SULFATE 1.6; 3.13; 17.5 G/177ML; G/177ML; G/177ML
17.5-3.13-1.6 SOLUTION ORAL
Qty: 1 | Refills: 0 | Status: ACTIVE | COMMUNITY
Start: 2023-06-01 | End: 1900-01-01

## 2023-06-01 NOTE — ASSESSMENT
[FreeTextEntry1] : Attending Attestation \par \par A low acid / reflux diet was discussed in great detail including not smoking, not drinking alcohol, and not\par consuming foods that irritate the esophagus. It is helpful to eat small meals throughout the day instead\par of large meals. You should avoid eating before bedtime or lying down after you eat. It can be helpful to\par raise the head of your bed six inches. Additionally, you should maintain a healthy weight and good\par posture.. The patient was given written material to take home and review. \par \par Patient will begin Omeprazole 40 mg PO daily. Medication reviewed side effects and adverse reactions.\par \par EGD and Colonoscopy procedure ordered The risks benefits alternatives and complications of the procedure/s were explained to the patient at length. The patient was agreeable and we will proceed. Preparation for procedure discussed reviewed side effects and adverse reactions to prep.\par I discussed the risks benefits and alternatives of Hemorrhoid Electrical Treatment at length. we\par discussed the procedure and the recovery period. We also discussed that periodically additional /\par surgical hemorrhoid treatment may become necessary.\par \par \par \par Patient verbalized understanding of all information provided. All questions answered and reviewed.\par \par I spent 45 minutes with the patient as well as reviewing documents prior to and after the office visit\par

## 2023-06-01 NOTE — PHYSICAL EXAM

## 2023-06-01 NOTE — HISTORY OF PRESENT ILLNESS
[FreeTextEntry1] : 43 year old male presents with complains of stomach cramps and blood in the stool. When patient consumes fruits he has abdominal cramps immediately. Blood in the stool occurred in the beginning of May. He denies any epsiodes of constipation or difficilty with having bowel movements. Blood was found in toliet and bowel. Stomach cramps are intermittent but recently occurred more frequently. \par \par PAtient with hx of with sickle cell trait, H/O splenomegaly, H/O TIA, and chronic back pain, who was referred to Heme [facility] because of RBC microcytosis, prenatal counseling, and leukocytosis. In 2012, he suffered hand numbness, possible TIA, (the patietn is emphatic to state that he did not have a TIA) with visual disturbances after event. \par During period as part of "genetic evaluation" he was evaluated for leukocytosis ; he does not recollect the name of the physician and had negative bone marrow biopsy. Procedure performed at [facility].\par  He has since had migraine headaches but no recurrence of TIA. He has had chronic back pain and steroid injections in 2014, June 2018, July 2018, and November 28, 2018. He has continued to have leukocytosis and had WBC 13.3 and ANC 8.8 on 12/7/18, when first seen by me. He also has sickle cell trait confirmed by hemoglobin electrophoresis and MCV 77.1 and RBC count 5.94 on 12/7/18. He is referred because of concern for thalassemia in future pregnancies with his partner.\par The patietn has been seen by hematology twice previously once by Dr PIPPA Joe in 2013 and second time by Fish Mullins in 2018. Both had evaluated a neutrophilic leukocytosis. The patient is no longer on steroids for the neurosurgical back disorders including spinal stenosis, disc herniation. He had surgery 901/22/2019)a radical corpectomy 08/22/2022 in [place] records US of abdomen shows fatty liver (diabetes related) no hepatomegaly and no mention of spleen size\par He has a history of thalassemia trait (microcytosis) and sickle cell trait. He has not had transfusions\par \par Patient had a abdominal sonogram on May 31/23 revealed moderate to severe fatty liver, gallbladder is not distended. Normal  size spleen. \par \par Denies melena, or hematemesis.\par \par \par \par

## 2023-06-01 NOTE — REVIEW OF SYSTEMS
[As Noted in HPI] : as noted in HPI [Abdominal Pain] : abdominal pain [Heartburn] : heartburn [Bleeding] : bleeding [Negative] : Heme/Lymph

## 2023-06-16 ENCOUNTER — RX RENEWAL (OUTPATIENT)
Age: 43
End: 2023-06-16

## 2023-06-19 ENCOUNTER — RX RENEWAL (OUTPATIENT)
Age: 43
End: 2023-06-19

## 2023-07-18 ENCOUNTER — FORM ENCOUNTER (OUTPATIENT)
Age: 43
End: 2023-07-18

## 2023-07-19 ENCOUNTER — OUTPATIENT (OUTPATIENT)
Dept: OUTPATIENT SERVICES | Facility: HOSPITAL | Age: 43
LOS: 1 days | End: 2023-07-19
Payer: COMMERCIAL

## 2023-07-19 ENCOUNTER — APPOINTMENT (OUTPATIENT)
Dept: SLEEP CENTER | Facility: CLINIC | Age: 43
End: 2023-07-19
Payer: COMMERCIAL

## 2023-07-19 DIAGNOSIS — L72.9 FOLLICULAR CYST OF THE SKIN AND SUBCUTANEOUS TISSUE, UNSPECIFIED: Chronic | ICD-10-CM

## 2023-07-19 DIAGNOSIS — Z98.890 OTHER SPECIFIED POSTPROCEDURAL STATES: Chronic | ICD-10-CM

## 2023-07-19 PROCEDURE — 95800 SLP STDY UNATTENDED: CPT

## 2023-07-19 PROCEDURE — 95800 SLP STDY UNATTENDED: CPT | Mod: 26

## 2023-07-26 DIAGNOSIS — G47.33 OBSTRUCTIVE SLEEP APNEA (ADULT) (PEDIATRIC): ICD-10-CM

## 2023-08-03 ENCOUNTER — TRANSCRIPTION ENCOUNTER (OUTPATIENT)
Age: 43
End: 2023-08-03

## 2023-08-03 RX ORDER — SODIUM CHLORIDE 9 MG/ML
3 INJECTION INTRAMUSCULAR; INTRAVENOUS; SUBCUTANEOUS EVERY 8 HOURS
Refills: 0 | Status: DISCONTINUED | OUTPATIENT
Start: 2023-08-04 | End: 2023-08-18

## 2023-08-03 NOTE — ASU DISCHARGE PLAN (ADULT/PEDIATRIC) - NS MD DC FALL RISK RISK
For information on Fall & Injury Prevention, visit: [email or web address] OR  [email or web address] OR  https://www.cdc.gov/steadi/patient.html

## 2023-08-04 ENCOUNTER — OUTPATIENT (OUTPATIENT)
Dept: OUTPATIENT SERVICES | Facility: HOSPITAL | Age: 43
LOS: 1 days | Discharge: HOME | End: 2023-08-04
Payer: COMMERCIAL

## 2023-08-04 ENCOUNTER — RESULT REVIEW (OUTPATIENT)
Age: 43
End: 2023-08-04

## 2023-08-04 ENCOUNTER — APPOINTMENT (OUTPATIENT)
Dept: GASTROENTEROLOGY | Facility: HOSPITAL | Age: 43
End: 2023-08-04
Payer: COMMERCIAL

## 2023-08-04 VITALS
RESPIRATION RATE: 15 BRPM | SYSTOLIC BLOOD PRESSURE: 112 MMHG | DIASTOLIC BLOOD PRESSURE: 74 MMHG | OXYGEN SATURATION: 98 %

## 2023-08-04 VITALS
RESPIRATION RATE: 18 BRPM | OXYGEN SATURATION: 99 % | HEIGHT: 68 IN | SYSTOLIC BLOOD PRESSURE: 114 MMHG | WEIGHT: 225.09 LBS | DIASTOLIC BLOOD PRESSURE: 80 MMHG | HEART RATE: 95 BPM | TEMPERATURE: 98 F

## 2023-08-04 DIAGNOSIS — Z98.890 OTHER SPECIFIED POSTPROCEDURAL STATES: Chronic | ICD-10-CM

## 2023-08-04 DIAGNOSIS — L72.9 FOLLICULAR CYST OF THE SKIN AND SUBCUTANEOUS TISSUE, UNSPECIFIED: Chronic | ICD-10-CM

## 2023-08-04 LAB — GLUCOSE BLDC GLUCOMTR-MCNC: 123 MG/DL — HIGH (ref 70–99)

## 2023-08-04 PROCEDURE — 88312 SPECIAL STAINS GROUP 1: CPT | Mod: 26

## 2023-08-04 PROCEDURE — 46930 DESTROY INTERNAL HEMORRHOIDS: CPT | Mod: 59

## 2023-08-04 PROCEDURE — 45378 DIAGNOSTIC COLONOSCOPY: CPT

## 2023-08-04 PROCEDURE — ZZZZZ: CPT

## 2023-08-04 PROCEDURE — 43239 EGD BIOPSY SINGLE/MULTIPLE: CPT | Mod: 59

## 2023-08-04 PROCEDURE — 88305 TISSUE EXAM BY PATHOLOGIST: CPT | Mod: 26

## 2023-08-04 RX ORDER — SEMAGLUTIDE 0.68 MG/ML
1 INJECTION, SOLUTION SUBCUTANEOUS
Refills: 0 | DISCHARGE

## 2023-08-04 RX ORDER — SODIUM CHLORIDE 9 MG/ML
1000 INJECTION, SOLUTION INTRAVENOUS
Refills: 0 | Status: DISCONTINUED | OUTPATIENT
Start: 2023-08-04 | End: 2023-08-04

## 2023-08-04 RX ADMIN — SODIUM CHLORIDE 100 MILLILITER(S): 9 INJECTION, SOLUTION INTRAVENOUS at 14:15

## 2023-08-04 NOTE — ASU PATIENT PROFILE, ADULT - NSICDXPASTMEDICALHX_GEN_ALL_CORE_FT
PAST MEDICAL HISTORY:  Disc displacement, lumbar     H/O pilonidal cyst     RBC microcytosis     Sickle cell trait     T2DM (type 2 diabetes mellitus)     TIA (transient ischemic attack) possible, ~2012     PAST MEDICAL HISTORY:  Disc displacement, lumbar     H/O pilonidal cyst     RBC microcytosis     Sickle cell trait     Sleep apnea just diagnosed  not started using any device yet    T2DM (type 2 diabetes mellitus)     TIA (transient ischemic attack) possible, ~2012

## 2023-08-04 NOTE — ASU PATIENT PROFILE, ADULT - NSICDXPASTSURGICALHX_GEN_ALL_CORE_FT
PAST SURGICAL HISTORY:  Cyst of skin back, excision    H/O colonoscopy     S/P lumbar spine operation

## 2023-08-07 LAB — SURGICAL PATHOLOGY STUDY: SIGNIFICANT CHANGE UP

## 2023-08-08 PROBLEM — G47.30 SLEEP APNEA, UNSPECIFIED: Chronic | Status: ACTIVE | Noted: 2023-08-04

## 2023-08-09 DIAGNOSIS — D57.3 SICKLE-CELL TRAIT: ICD-10-CM

## 2023-08-09 DIAGNOSIS — K64.1 SECOND DEGREE HEMORRHOIDS: ICD-10-CM

## 2023-08-09 DIAGNOSIS — K31.89 OTHER DISEASES OF STOMACH AND DUODENUM: ICD-10-CM

## 2023-08-09 DIAGNOSIS — K21.9 GASTRO-ESOPHAGEAL REFLUX DISEASE WITHOUT ESOPHAGITIS: ICD-10-CM

## 2023-08-09 DIAGNOSIS — Z86.73 PERSONAL HISTORY OF TRANSIENT ISCHEMIC ATTACK (TIA), AND CEREBRAL INFARCTION WITHOUT RESIDUAL DEFICITS: ICD-10-CM

## 2023-08-09 DIAGNOSIS — K62.5 HEMORRHAGE OF ANUS AND RECTUM: ICD-10-CM

## 2023-08-09 DIAGNOSIS — G47.33 OBSTRUCTIVE SLEEP APNEA (ADULT) (PEDIATRIC): ICD-10-CM

## 2023-08-09 DIAGNOSIS — E11.9 TYPE 2 DIABETES MELLITUS WITHOUT COMPLICATIONS: ICD-10-CM

## 2023-08-24 ENCOUNTER — APPOINTMENT (OUTPATIENT)
Dept: FAMILY MEDICINE | Facility: CLINIC | Age: 43
End: 2023-08-24
Payer: COMMERCIAL

## 2023-08-24 VITALS
HEIGHT: 68 IN | BODY MASS INDEX: 33.49 KG/M2 | DIASTOLIC BLOOD PRESSURE: 68 MMHG | HEART RATE: 97 BPM | TEMPERATURE: 98.2 F | WEIGHT: 221 LBS | SYSTOLIC BLOOD PRESSURE: 93 MMHG | OXYGEN SATURATION: 100 %

## 2023-08-24 LAB
ALBUMIN SERPL ELPH-MCNC: 4.4 G/DL
ALP BLD-CCNC: 89 U/L
ALT SERPL-CCNC: 30 U/L
ANION GAP SERPL CALC-SCNC: 12 MMOL/L
AST SERPL-CCNC: 18 U/L
BILIRUB SERPL-MCNC: 0.7 MG/DL
BUN SERPL-MCNC: 10 MG/DL
CALCIUM SERPL-MCNC: 9.8 MG/DL
CHLORIDE SERPL-SCNC: 103 MMOL/L
CHOLEST SERPL-MCNC: 183 MG/DL
CO2 SERPL-SCNC: 25 MMOL/L
CREAT SERPL-MCNC: 1.26 MG/DL
EGFR: 73 ML/MIN/1.73M2
GLUCOSE SERPL-MCNC: 114 MG/DL
HDLC SERPL-MCNC: 40 MG/DL
LDLC SERPL CALC-MCNC: 118 MG/DL
NONHDLC SERPL-MCNC: 143 MG/DL
POTASSIUM SERPL-SCNC: 4.8 MMOL/L
PROT SERPL-MCNC: 7.3 G/DL
SODIUM SERPL-SCNC: 141 MMOL/L
TRIGL SERPL-MCNC: 138 MG/DL
TSH SERPL-ACNC: 2.07 UIU/ML

## 2023-08-24 PROCEDURE — 36415 COLL VENOUS BLD VENIPUNCTURE: CPT

## 2023-08-24 PROCEDURE — 99214 OFFICE O/P EST MOD 30 MIN: CPT | Mod: 25

## 2023-08-25 LAB
CREAT SPEC-SCNC: 245 MG/DL
ESTIMATED AVERAGE GLUCOSE: 126 MG/DL
HBA1C MFR BLD HPLC: 6 %
MICROALBUMIN 24H UR DL<=1MG/L-MCNC: <1.2 MG/DL
MICROALBUMIN/CREAT 24H UR-RTO: NORMAL MG/G

## 2023-08-30 ENCOUNTER — APPOINTMENT (OUTPATIENT)
Dept: GASTROENTEROLOGY | Facility: CLINIC | Age: 43
End: 2023-08-30
Payer: COMMERCIAL

## 2023-08-30 DIAGNOSIS — K92.1 MELENA: ICD-10-CM

## 2023-08-30 PROCEDURE — 99214 OFFICE O/P EST MOD 30 MIN: CPT | Mod: 24,95

## 2023-08-30 NOTE — HISTORY OF PRESENT ILLNESS
[FreeTextEntry1] : Patient is a 44 y/o male with a history of GERD, Hemorrhoids, and NAFLD, currently on Omeprazole 40 mg once daily as well as Ozempic for weight loss, who presents for a procedure f/u s/p having EGD-Colonoscopy and HET on 8/4/2023. EGD-colonoscopy showed gastritis, and pt denies having any rectal bleeding ever since having the HET. Pt is currently asymptomatic and feeling well.

## 2023-08-30 NOTE — REASON FOR VISIT
[Home] : at home, [unfilled] , at the time of the visit. [Medical Office: ([facility])___] : at the medical office located in  [Patient] : the patient [Post-op/Procedure: _________] : a [unfilled] post-op/procedure visit

## 2023-09-25 ENCOUNTER — RX RENEWAL (OUTPATIENT)
Age: 43
End: 2023-09-25

## 2023-09-28 ENCOUNTER — APPOINTMENT (OUTPATIENT)
Dept: INTERNAL MEDICINE | Facility: CLINIC | Age: 43
End: 2023-09-28

## 2023-09-29 ENCOUNTER — APPOINTMENT (OUTPATIENT)
Dept: NEUROSURGERY | Facility: CLINIC | Age: 43
End: 2023-09-29
Payer: COMMERCIAL

## 2023-09-29 DIAGNOSIS — M51.26 OTHER INTERVERTEBRAL DISC DISPLACEMENT, LUMBAR REGION: ICD-10-CM

## 2023-09-29 DIAGNOSIS — M54.16 RADICULOPATHY, LUMBAR REGION: ICD-10-CM

## 2023-09-29 PROCEDURE — 99212 OFFICE O/P EST SF 10 MIN: CPT | Mod: 95

## 2023-10-04 ENCOUNTER — OUTPATIENT (OUTPATIENT)
Dept: OUTPATIENT SERVICES | Facility: HOSPITAL | Age: 43
LOS: 1 days | End: 2023-10-04
Payer: COMMERCIAL

## 2023-10-04 ENCOUNTER — APPOINTMENT (OUTPATIENT)
Dept: SLEEP CENTER | Facility: CLINIC | Age: 43
End: 2023-10-04
Payer: COMMERCIAL

## 2023-10-04 DIAGNOSIS — L72.9 FOLLICULAR CYST OF THE SKIN AND SUBCUTANEOUS TISSUE, UNSPECIFIED: Chronic | ICD-10-CM

## 2023-10-04 DIAGNOSIS — Z98.890 OTHER SPECIFIED POSTPROCEDURAL STATES: Chronic | ICD-10-CM

## 2023-10-04 PROCEDURE — 95811 POLYSOM 6/>YRS CPAP 4/> PARM: CPT

## 2023-10-04 PROCEDURE — 95811 POLYSOM 6/>YRS CPAP 4/> PARM: CPT | Mod: 26

## 2023-10-09 DIAGNOSIS — G47.33 OBSTRUCTIVE SLEEP APNEA (ADULT) (PEDIATRIC): ICD-10-CM

## 2023-11-16 ENCOUNTER — RX RENEWAL (OUTPATIENT)
Age: 43
End: 2023-11-16

## 2023-12-12 ENCOUNTER — RX RENEWAL (OUTPATIENT)
Age: 43
End: 2023-12-12

## 2023-12-12 RX ORDER — ATORVASTATIN CALCIUM 10 MG/1
10 TABLET, FILM COATED ORAL
Qty: 90 | Refills: 0 | Status: ACTIVE | COMMUNITY
Start: 2022-02-07 | End: 1900-01-01

## 2023-12-13 ENCOUNTER — RX RENEWAL (OUTPATIENT)
Age: 43
End: 2023-12-13

## 2024-03-04 ENCOUNTER — APPOINTMENT (OUTPATIENT)
Dept: FAMILY MEDICINE | Facility: CLINIC | Age: 44
End: 2024-03-04
Payer: COMMERCIAL

## 2024-03-04 VITALS
OXYGEN SATURATION: 98 % | DIASTOLIC BLOOD PRESSURE: 78 MMHG | WEIGHT: 226 LBS | TEMPERATURE: 98.2 F | HEART RATE: 97 BPM | RESPIRATION RATE: 16 BRPM | SYSTOLIC BLOOD PRESSURE: 100 MMHG | HEIGHT: 68 IN | BODY MASS INDEX: 34.25 KG/M2

## 2024-03-04 DIAGNOSIS — E11.9 TYPE 2 DIABETES MELLITUS W/OUT COMPLICATIONS: ICD-10-CM

## 2024-03-04 DIAGNOSIS — K21.9 GASTRO-ESOPHAGEAL REFLUX DISEASE W/OUT ESOPHAGITIS: ICD-10-CM

## 2024-03-04 DIAGNOSIS — K76.0 FATTY (CHANGE OF) LIVER, NOT ELSEWHERE CLASSIFIED: ICD-10-CM

## 2024-03-04 DIAGNOSIS — E78.00 PURE HYPERCHOLESTEROLEMIA, UNSPECIFIED: ICD-10-CM

## 2024-03-04 PROCEDURE — 36415 COLL VENOUS BLD VENIPUNCTURE: CPT

## 2024-03-04 PROCEDURE — 99214 OFFICE O/P EST MOD 30 MIN: CPT | Mod: 25

## 2024-03-04 RX ORDER — SEMAGLUTIDE 1.34 MG/ML
4 INJECTION, SOLUTION SUBCUTANEOUS
Qty: 3 | Refills: 3 | Status: ACTIVE | COMMUNITY
Start: 2022-02-07 | End: 1900-01-01

## 2024-03-05 LAB
25(OH)D3 SERPL-MCNC: 23.6 NG/ML
ALBUMIN SERPL ELPH-MCNC: 4.3 G/DL
ALP BLD-CCNC: 84 U/L
ALT SERPL-CCNC: 55 U/L
ANION GAP SERPL CALC-SCNC: 12 MMOL/L
AST SERPL-CCNC: 33 U/L
BASOPHILS # BLD AUTO: 0.13 K/UL
BASOPHILS NFR BLD AUTO: 1.1 %
BILIRUB SERPL-MCNC: 0.7 MG/DL
BUN SERPL-MCNC: 7 MG/DL
CALCIUM SERPL-MCNC: 9.8 MG/DL
CHLORIDE SERPL-SCNC: 105 MMOL/L
CHOLEST SERPL-MCNC: 159 MG/DL
CO2 SERPL-SCNC: 24 MMOL/L
CREAT SERPL-MCNC: 1.26 MG/DL
CREAT SPEC-SCNC: 290 MG/DL
EGFR: 73 ML/MIN/1.73M2
EOSINOPHIL # BLD AUTO: 0.33 K/UL
EOSINOPHIL NFR BLD AUTO: 2.7 %
ESTIMATED AVERAGE GLUCOSE: 151 MG/DL
GLUCOSE SERPL-MCNC: 146 MG/DL
HBA1C MFR BLD HPLC: 6.9 %
HCT VFR BLD CALC: 45.7 %
HDLC SERPL-MCNC: 42 MG/DL
HGB BLD-MCNC: 15.5 G/DL
IMM GRANULOCYTES NFR BLD AUTO: 0.2 %
LDLC SERPL CALC-MCNC: 97 MG/DL
LYMPHOCYTES # BLD AUTO: 2.93 K/UL
LYMPHOCYTES NFR BLD AUTO: 23.7 %
MAN DIFF?: NORMAL
MCHC RBC-ENTMCNC: 27.1 PG
MCHC RBC-ENTMCNC: 33.9 GM/DL
MCV RBC AUTO: 80 FL
MICROALBUMIN 24H UR DL<=1MG/L-MCNC: 1.6 MG/DL
MICROALBUMIN/CREAT 24H UR-RTO: 6 MG/G
MONOCYTES # BLD AUTO: 0.83 K/UL
MONOCYTES NFR BLD AUTO: 6.7 %
NEUTROPHILS # BLD AUTO: 8.09 K/UL
NEUTROPHILS NFR BLD AUTO: 65.6 %
NONHDLC SERPL-MCNC: 118 MG/DL
PLATELET # BLD AUTO: 318 K/UL
POTASSIUM SERPL-SCNC: 4.6 MMOL/L
PROT SERPL-MCNC: 7.3 G/DL
RBC # BLD: 5.71 M/UL
RBC # FLD: 15.6 %
SODIUM SERPL-SCNC: 141 MMOL/L
TRIGL SERPL-MCNC: 113 MG/DL
TSH SERPL-ACNC: 1.23 UIU/ML
WBC # FLD AUTO: 12.34 K/UL

## 2024-04-25 ENCOUNTER — RX RENEWAL (OUTPATIENT)
Age: 44
End: 2024-04-25

## 2024-05-09 RX ORDER — OMEPRAZOLE 40 MG/1
40 CAPSULE, DELAYED RELEASE ORAL
Qty: 30 | Refills: 0 | Status: ACTIVE | COMMUNITY
Start: 2023-06-01 | End: 1900-01-01

## 2024-06-02 NOTE — RESULTS/DATA
No [FreeTextEntry1] : EXAM: [id number] - MR SPINE CERVICAL - ORDERED BY: IESHA RIVERA\par \par EXAM: [id number] - MR SPINE THORACIC - ORDERED BY: IESHA RIVERA\par \par \par PROCEDURE DATE: 04/06/2023\par \par \par \par INTERPRETATION: MRI of the cervical and thoracic spine\par \par History: Neck and back pain\par \par Technique: Multiplanar, multi sequential images of the cervical and thoracic spine were obtained without contrast using standard protocol.\par \par Prior study: Thoracic spine radiographs dated 1/15/2019\par \par Findings:\par \par CERVICAL SPINE:\par \par Bone: No acute fracture.\par \par Alignment: No spondylolisthesis.\par \par Disc spaces: Mild multilevel disc desiccation throughout the cervical spine, worst at C5-C6.\par \par Spinal cord: No cord signal abnormality seen.\par \par Paraspinal/Prevertebral soft tissues: Unremarkable\par \par Evaluation of the individual motion segments demonstrates the following:\par \par C1/2 level: No central canal narrowing.\par \par C2/3 level: No canal or neuroforaminal stenosis.\par \par C3/4 level: No canal or neuroforaminal stenosis.\par \par C4/5 level: Small central disc protrusion. No canal or neuroforaminal stenosis.\par \par C5/6 level: Disc bulge with posterior osseous ridging. Mild central canal narrowing. No neuroforaminal stenosis.\par \par C6/7 level: Disc bulge with posterior osseous ridging. Mild bilateral uncovertebral joint hypertrophy. No neuroforaminal narrowing. Minimal central canal narrowing.\par \par C7/T1 level: Small right central/subarticular disc protrusion. No canal or neuroforaminal stenosis.\par \par THORACIC SPINE:\par \par Bone: No acute fracture.\par \par Alignment: No spondylolisthesis.\par \par Disc spaces: Minimal multilevel disc desiccation throughout the thoracic spine.\par \par Spinal cord: No cord signal abnormality.\par \par Paraspinal musculature: Unremarkable.\par \par Soft tissues: Unremarkable.\par \par Spinal canal/neural foramina:\par \par At T8-T9, there is no significant disc bulge. Mild bilateral facet arthrosis. Mild bilateral neural foraminal narrowing. No central canal stenosis.\par \par At T9-T10, there is no significant disc bulge. Mild bilateral facet arthrosis. Mild bilateral neural foraminal narrowing. No central canal stenosis.\par \par At T10-T11, there is no significant disc bulge. Mild bilateral facet arthrosis. Mild bilateral neural foraminal narrowing. No central canal stenosis.\par \par At T11-T12, there is no significant disc bulge. Mild left facet arthrosis. Mild left neural foraminal narrowing. No central canal stenosis.\par \par Impression:\par \par Multilevel degenerative changes throughout the cervical spine, as detailed above, resulting in up to mild central canal narrowing at C5-C6.\par \par Mild multilevel degenerative changes throughout the thoracic spine, as detailed above. No significant central canal stenosis within the thoracic spine. Mild multilevel neuroforaminal narrowing within the lower thoracic spine, as above. no

## 2024-07-09 ENCOUNTER — APPOINTMENT (OUTPATIENT)
Dept: FAMILY MEDICINE | Facility: CLINIC | Age: 44
End: 2024-07-09
Payer: COMMERCIAL

## 2024-07-09 VITALS
RESPIRATION RATE: 16 BRPM | DIASTOLIC BLOOD PRESSURE: 84 MMHG | HEIGHT: 68 IN | SYSTOLIC BLOOD PRESSURE: 139 MMHG | OXYGEN SATURATION: 100 % | WEIGHT: 230 LBS | HEART RATE: 98 BPM | BODY MASS INDEX: 34.86 KG/M2 | TEMPERATURE: 97.7 F

## 2024-07-09 DIAGNOSIS — K76.0 FATTY (CHANGE OF) LIVER, NOT ELSEWHERE CLASSIFIED: ICD-10-CM

## 2024-07-09 DIAGNOSIS — E11.9 TYPE 2 DIABETES MELLITUS W/OUT COMPLICATIONS: ICD-10-CM

## 2024-07-09 DIAGNOSIS — K21.9 GASTRO-ESOPHAGEAL REFLUX DISEASE W/OUT ESOPHAGITIS: ICD-10-CM

## 2024-07-09 DIAGNOSIS — Z00.00 ENCOUNTER FOR GENERAL ADULT MEDICAL EXAMINATION W/OUT ABNORMAL FINDINGS: ICD-10-CM

## 2024-07-09 PROCEDURE — 99396 PREV VISIT EST AGE 40-64: CPT

## 2024-07-09 PROCEDURE — 36415 COLL VENOUS BLD VENIPUNCTURE: CPT

## 2024-07-10 LAB
25(OH)D3 SERPL-MCNC: 20.9 NG/ML
ALBUMIN SERPL ELPH-MCNC: 4.4 G/DL
ALP BLD-CCNC: 92 U/L
ALT SERPL-CCNC: 53 U/L
ANION GAP SERPL CALC-SCNC: 16 MMOL/L
AST SERPL-CCNC: 31 U/L
BASOPHILS # BLD AUTO: 0.1 K/UL
BASOPHILS NFR BLD AUTO: 0.8 %
BUN SERPL-MCNC: 9 MG/DL
CALCIUM SERPL-MCNC: 9.6 MG/DL
CHLORIDE SERPL-SCNC: 101 MMOL/L
CO2 SERPL-SCNC: 21 MMOL/L
CREAT SERPL-MCNC: 1.16 MG/DL
CREAT SPEC-SCNC: 140 MG/DL
EGFR: 81 ML/MIN/1.73M2
EOSINOPHIL # BLD AUTO: 0.31 K/UL
EOSINOPHIL NFR BLD AUTO: 2.6 %
ESTIMATED AVERAGE GLUCOSE: 166 MG/DL
GLUCOSE SERPL-MCNC: 142 MG/DL
HBA1C MFR BLD HPLC: 7.4 %
HCT VFR BLD CALC: 44.9 %
HDLC SERPL-MCNC: 38 MG/DL
HGB BLD-MCNC: 15.4 G/DL
IMM GRANULOCYTES NFR BLD AUTO: 0.3 %
LDLC SERPL CALC-MCNC: 129 MG/DL
LYMPHOCYTES # BLD AUTO: 2.87 K/UL
LYMPHOCYTES NFR BLD AUTO: 24.4 %
MAN DIFF?: NORMAL
MCHC RBC-ENTMCNC: 34.3 GM/DL
MCV RBC AUTO: 77 FL
MICROALBUMIN 24H UR DL<=1MG/L-MCNC: <1.2 MG/DL
MONOCYTES # BLD AUTO: 0.76 K/UL
MONOCYTES NFR BLD AUTO: 6.5 %
NEUTROPHILS # BLD AUTO: 7.7 K/UL
NEUTROPHILS NFR BLD AUTO: 65.4 %
NONHDLC SERPL-MCNC: 160 MG/DL
PLATELET # BLD AUTO: 356 K/UL
POTASSIUM SERPL-SCNC: 4.6 MMOL/L
PROT SERPL-MCNC: 7.3 G/DL
PSA SERPL-MCNC: 0.44 NG/ML
RBC # FLD: 14.2 %
SODIUM SERPL-SCNC: 138 MMOL/L
TRIGL SERPL-MCNC: 173 MG/DL
TSH SERPL-ACNC: 1.1 UIU/ML

## 2024-10-30 ENCOUNTER — RX RENEWAL (OUTPATIENT)
Age: 44
End: 2024-10-30

## (undated) DEVICE — GLV 7.5 PROTEXIS (WHITE)

## (undated) DEVICE — FORCEP RADIAL JAW 4 W NDL 2.4MM 2.8MM 240CM ORANGE DISP

## (undated) DEVICE — COVIDIEN BIPOLAR FORCEP HEMORRRHOID

## (undated) DEVICE — GLV 9 PROTEXIS (WHITE)

## (undated) DEVICE — KIT ENDO PROCEDURE CUST W/VLV

## (undated) DEVICE — BITE BLOCK ADULT 20 X 27MM (GREEN)

## (undated) DEVICE — ELCTR BIPOLAR CORD 12FT

## (undated) DEVICE — TUBING HYBRID CO2